# Patient Record
Sex: MALE | Race: WHITE | Employment: OTHER | URBAN - METROPOLITAN AREA
[De-identification: names, ages, dates, MRNs, and addresses within clinical notes are randomized per-mention and may not be internally consistent; named-entity substitution may affect disease eponyms.]

---

## 2017-01-05 ENCOUNTER — TELEPHONE (OUTPATIENT)
Dept: NEUROLOGY | Facility: CLINIC | Age: 62
End: 2017-01-05

## 2017-01-26 NOTE — TELEPHONE ENCOUNTER
Spoke with patient and informed patient of message below. Pt states his insurance changed to Jojo Drummond on 1/1/2017. Advised pt to stop by office with new insurance cards. Pt verbalized understanding and states he will stop by tomorrow.   No further questions at

## 2017-02-06 NOTE — TELEPHONE ENCOUNTER
Spoke to Lula Herrera at [address], [employer] ([phone]) is valid and billable, no copayment applies to the service, covered at 100% of the allowed amount. No prior authorization or predetermination required.  Call reference #[id number], time

## 2017-02-07 ENCOUNTER — OFFICE VISIT (OUTPATIENT)
Dept: SURGERY | Facility: CLINIC | Age: 62
End: 2017-02-07
Attending: SURGERY

## 2017-02-07 VITALS
DIASTOLIC BLOOD PRESSURE: 81 MMHG | OXYGEN SATURATION: 93 % | SYSTOLIC BLOOD PRESSURE: 122 MMHG | RESPIRATION RATE: 18 BRPM | HEART RATE: 81 BPM | BODY MASS INDEX: 42.59 KG/M2 | HEIGHT: 71 IN | WEIGHT: 304.19 LBS

## 2017-02-07 NOTE — H&P
[address], [address] [address]  Dept: [phone]    2/7/2017  Bariatric New Patient Evaluation    Chief Complaint: Morbid obesity    History of Present Illness:  Re [facility]  2012    Comment Dr. Alexander Petersen    OTHER SURGICAL HISTORY  3/11    Comment A.JON    OTHER SURGICAL HISTORY  7/07    Comment FOOT SX    OTHER SURGICAL HISTORY      Comment cervical surgery Years of Education:                 Number of children:               Social History Main Topics    Smoking Status: Never Smoker                      Smokeless Status: Never Used                        Alcohol Use: Yes           0.0 oz/week       0 Sta •  Mexiletine HCl 150 MG Oral Cap, Take 150 mg by mouth 2 (two) times daily. , Disp: , Rfl:   •  Multiple Vitamin (MULTIVITAMIN OR), Take  by mouth., Disp: , Rfl:   •  Nebivolol HCl (BYSTOLIC) 2.5 MG Oral Tab, Take 2.5 mg by mouth every evening., Disp: , did talk about the different operations available as well as her relative strengths and weaknesses. Specifically he is interested in a gastric bypass which I believe would make a good choice for him given his diabetes mellitus.     Plan: Follow-up with me

## 2017-02-08 ENCOUNTER — TELEPHONE (OUTPATIENT)
Dept: SURGERY | Facility: CLINIC | Age: 62
End: 2017-02-08

## 2017-02-08 NOTE — TELEPHONE ENCOUNTER
2/6/17 @ 2:50pm Spoke to Aubrie at [facility], [phone]. AXY#[id number]. Verified Bariatric Benefits: Pt has 3 mo wgt mngmt criteria within 12 mo prior to surgery. 1 surgery per lifetime. Services must be received at [facility] for benefits.  No benes for outside facili

## 2017-02-09 ENCOUNTER — APPOINTMENT (OUTPATIENT)
Dept: GENERAL RADIOLOGY | Facility: HOSPITAL | Age: 62
End: 2017-02-09
Attending: ANESTHESIOLOGY
Payer: COMMERCIAL

## 2017-02-09 ENCOUNTER — SURGERY (OUTPATIENT)
Age: 62
End: 2017-02-09

## 2017-02-10 ENCOUNTER — TELEPHONE (OUTPATIENT)
Dept: SURGERY | Facility: CLINIC | Age: 62
End: 2017-02-10

## 2017-02-20 ENCOUNTER — OFFICE VISIT (OUTPATIENT)
Dept: SURGERY | Facility: CLINIC | Age: 62
End: 2017-02-20
Attending: INTERNAL MEDICINE

## 2017-02-20 VITALS
BODY MASS INDEX: 41.59 KG/M2 | SYSTOLIC BLOOD PRESSURE: 133 MMHG | OXYGEN SATURATION: 94 % | HEART RATE: 80 BPM | HEIGHT: 71 IN | RESPIRATION RATE: 18 BRPM | WEIGHT: 297.06 LBS | DIASTOLIC BLOOD PRESSURE: 84 MMHG

## 2017-02-20 DIAGNOSIS — K21.00 GASTROESOPHAGEAL REFLUX DISEASE WITH ESOPHAGITIS: ICD-10-CM

## 2017-02-20 DIAGNOSIS — E66.01 MORBID OBESITY WITH BMI OF 40.0-44.9, ADULT (HCC): ICD-10-CM

## 2017-02-20 DIAGNOSIS — G47.33 OSA ON CPAP: ICD-10-CM

## 2017-02-20 DIAGNOSIS — R53.82 CHRONIC FATIGUE: ICD-10-CM

## 2017-02-20 DIAGNOSIS — E55.9 VITAMIN D DEFICIENCY: ICD-10-CM

## 2017-02-20 DIAGNOSIS — E78.00 HYPERCHOLESTEROLEMIA: ICD-10-CM

## 2017-02-20 DIAGNOSIS — I10 ESSENTIAL HYPERTENSION: Primary | ICD-10-CM

## 2017-02-20 DIAGNOSIS — R73.09 ABNORMAL BLOOD SUGAR: ICD-10-CM

## 2017-02-20 DIAGNOSIS — Z99.89 OSA ON CPAP: ICD-10-CM

## 2017-02-20 PROBLEM — K21.9 GERD (GASTROESOPHAGEAL REFLUX DISEASE): Status: ACTIVE | Noted: 2017-02-20

## 2017-02-20 PROBLEM — R53.83 FATIGUE: Status: ACTIVE | Noted: 2017-02-20

## 2017-02-20 PROCEDURE — 99214 OFFICE O/P EST MOD 30 MIN: CPT | Performed by: INTERNAL MEDICINE

## 2017-02-20 RX ORDER — PREDNISONE 20 MG/1
TABLET ORAL
Refills: 0 | COMMUNITY
Start: 2017-02-13 | End: 2017-04-11 | Stop reason: ALTCHOICE

## 2017-02-20 NOTE — PROGRESS NOTES
The Wellness and Weight Loss Consultation Note       Date of Consult:  2017    Patient:  Danilo Mc  :      10/12/1955  MRN:      [id number]    Referring Provider: Dr. Iglesia Joiner       Chief Complaint:  Patient presents with:  Consult  Pre-Op E (two) times daily. Disp:  Rfl: 3   ergocalciferol 06517 UNITS Oral Cap 1 tab PO once weekly for 12 weeks Disp: 12 capsule Rfl: 0   triamcinolone acetonide 0.1 % External Cream Apply topically 2 (two) times daily.  Apply as needed Disp:  Rfl:    Omeprazole 4 Smokeless tobacco: Never Used    Alcohol Use: Yes  0.0 oz/week    0 Standard drinks or equivalent per week         Comment: 3 drinks a month     Drug Use: No    Sexual Activity: Not on file   Not on file  Other Topics Concern    Caffeine Concern Yes    Com Jacquelyn Client Father    • benign prostatic hypertrophy[other] [OTHER] Father      x4   • CABG[other] [OTHER] Father    • Heart Disease Father    • Heart Disorder Mother    • Other[other] Jacquelyn Client Mother    • Asthma Mother    • Hypertension Mother    • Heart Disea bilaterally  Heart: S1, S2 normal  Abdomen: firm, obese, non tender  Extremities: edema trace, limited movement due to pain  Pulses: 2+ and symmetric  Skin: Skin color, texture, turgor normal. No rashes or lesions    ASSESSMENT     HYPERTENSION:  The patie or regular soda. 3. Increase activity-upper body exercises, walk 10 minutes per day. 4. Increase fruit and vegetable servings to 5-6 per day.       Attended seminar    Saw surgeon  To see RD/psych  Needs pulmonary and cardiac clearance    Needs pre surgic

## 2017-02-21 ENCOUNTER — OFFICE VISIT (OUTPATIENT)
Dept: SURGERY | Facility: CLINIC | Age: 62
End: 2017-02-21

## 2017-02-21 DIAGNOSIS — E66.01 MORBID OBESITY WITH BMI OF 40.0-44.9, ADULT (HCC): Primary | ICD-10-CM

## 2017-02-21 PROCEDURE — 97802 MEDICAL NUTRITION INDIV IN: CPT | Performed by: DIETITIAN, REGISTERED

## 2017-02-22 VITALS — HEIGHT: 71 IN | BODY MASS INDEX: 41.58 KG/M2 | WEIGHT: 297 LBS

## 2017-02-22 NOTE — PROGRESS NOTES
[address] [facility] BARIATRIC AND WEIGHT LOSS CLINIC  [address] [place] [address]  Dept: [phone]  Loc: [phone]    02/22/2017    Bariatric Initial Nutrition Assessment    Suzann Cranker is a 57year old male.     Refer binge eating behavior: Eats a larger amount of food than normal in a short period of time. , Feels a lack of control over the amount of food or rate of food eaten., Eats much more rapidly than normal., Eats until uncomfortably full., Eats large amounts of f MG Oral Tab, TAKE 3 TABS IN THE AM X 3 DAYS, 2 TABS IN THE AM X 7 DAYS THE 1 TAB IN THE AM X 7 DAYS  •  REED-24 300 MG Oral Capsule SR 24 Hr, Take 300 mg by mouth 2 (two) times daily.   •  ergocalciferol 65254 UNITS Oral Cap, 1 tab PO once weekly for 12 wee Snack       Lunch     PM Snack     Dinner Evening Snack   Sweet roll with milk or juice  Leftover lasagna OR sandwich on white bread with ham/turkey, miracle whip, cheese + chips Cake, candy, chips, crackers [employer] beef sandwich, 1/2 order fries, efren goals and Obtain MVI  2. Coordination of care: attend support group prior to surgery; reminder for importance of multidisciplinary consultations.   3.  Materials given: Choose Your Foods, Food List for [facility], [facility] Bariatric Manual and [phone]

## 2017-02-28 ENCOUNTER — OFFICE VISIT (OUTPATIENT)
Dept: NUTRITION/OBESITY MEDICINE | Facility: HOSPITAL | Age: 62
End: 2017-02-28
Attending: SURGERY

## 2017-02-28 DIAGNOSIS — E66.01 MORBID OBESITY DUE TO EXCESS CALORIES (HCC): Primary | ICD-10-CM

## 2017-02-28 PROCEDURE — 96101 PSYCL TSTG PR HR F2F TIME W/PT: CPT | Performed by: OTHER

## 2017-02-28 NOTE — PROGRESS NOTES
Psychological Evaluation    Patient Name: Pepito Morales  Visit Date:  2017  :   10/12/1955    Reason for Referral:    Rosales Tan is a 57year old   male who was referred for a psychological evaluation prior to being scheduled for [facility] stated that since he has been disability he has gained approximately 30 lbs, which he attributes to being more sedentary, as he was rather active at his job, and making unhealthy choices.     Concerning medical issues, Migdalia Al noted that she suffers from hyper while watching TV.     Tests Administered:     Structured Clinical Interview   Lindy Langener Depression Inventory II (BDI II)   Abel Anxiety Inventory (NICOLAS)   Quality of Life Inventory (QOLI)   Rapid Eating Assessment for Patients (REAP)   The Body Esteem Scale (BES hands trembling, feeling shaky, difficulty breathing, and indigestion, all of which he endorsed as being attributable to physical conditions, as opposed to mood. Favio completed The Body-Esteem Scale (BES).   The BES is a 39-item Likert-type scale self-as Madison Cleveland noted some problematic eating patterns, endorsing that he usually/often: eats less than 3 servings of whole grain products a day, eats less than 2-3 servings of fruit a day, eats less than 3-4 servings of vegetables/potatoes a day, uses re sensorium or mental capacity. His overall affect and mood fell within the normal range. Also, his speech and thought processes fell within the normal range.    Migdalia Al appears to be motivated and ready to undergo the procedure and follow the postsurgical dem that although he is aware that he is making poor choices, it is difficult for him to make a healthier choice because it doesn't feel as satiating.   Along with a history of bingeing, his performance on the REAP indicated some problematic eating habits, whic

## 2017-03-28 ENCOUNTER — TELEPHONE (OUTPATIENT)
Dept: SURGERY | Facility: CLINIC | Age: 62
End: 2017-03-28

## 2017-03-28 NOTE — TELEPHONE ENCOUNTER
3/28/17 @ 12:08pm Lm for patient to call and schedule appt this week with More Campo or Madeleine Loredo for f/u appt. Patient has a 3 month weight management criteria on his plan and has not seen anyone in March.  To keep him on track for surgery, he needs

## 2017-03-31 ENCOUNTER — OFFICE VISIT (OUTPATIENT)
Dept: SURGERY | Facility: CLINIC | Age: 62
End: 2017-03-31

## 2017-03-31 VITALS — BODY MASS INDEX: 41 KG/M2 | WEIGHT: 292.88 LBS | HEIGHT: 71 IN

## 2017-03-31 DIAGNOSIS — E66.01 MORBID OBESITY DUE TO EXCESS CALORIES (HCC): Primary | ICD-10-CM

## 2017-03-31 PROCEDURE — 97803 MED NUTRITION INDIV SUBSEQ: CPT | Performed by: DIETITIAN, REGISTERED

## 2017-03-31 NOTE — PROGRESS NOTES
[address] [facility] AND WEIGHT LOSS CLINIC  [address] [address]  Dept: [phone]  Loc: [phone]    03/31/2017      Bariatric Follow-up Nutrition Session    Kaylin Bustillo is a 57year old male.      Ass 150 mg by mouth 2 (two) times daily  •  Multiple Vitamin (MULTIVITAMIN OR), Take  by mouth  •  Nebivolol HCl (BYSTOLIC) 2.5 MG Oral Tab, Take 2.5 mg by mouth every evening  •  Montelukast Sodium (SINGULAIR) 10 MG Oral Tab, Take 10 mg by mouth nightly    He Anthropometric measurements, Food/fluid intake/choices, Food intolerances, Activity level, Vitamin/mineral supplementation, Reinforce goals, Calorie/protein intake and Other:  RTC in 1 month, appt scheduled  Additional RD visits required to review conc

## 2017-04-04 ENCOUNTER — OFFICE VISIT (OUTPATIENT)
Dept: SURGERY | Facility: CLINIC | Age: 62
End: 2017-04-04

## 2017-04-04 VITALS
BODY MASS INDEX: 41 KG/M2 | WEIGHT: 292.31 LBS | RESPIRATION RATE: 16 BRPM | HEART RATE: 77 BPM | SYSTOLIC BLOOD PRESSURE: 110 MMHG | DIASTOLIC BLOOD PRESSURE: 59 MMHG

## 2017-04-04 NOTE — PROGRESS NOTES
[address], [address] [address]  Dept: [phone]    4/4/2017    BARIATRIC EXISTING PATIENT/FOLLOW UP    HPI:  Alina Nair is a 57year old-year old male who pre I eagerly await his final note. He has upcoming appointment with a cardiologist.  I do believe a gastric bypass would be a fine operation for the patient we talked about the operation itself the anatomic changes made.   Additionally we talked about potenti

## 2017-04-06 ENCOUNTER — OFFICE VISIT (OUTPATIENT)
Dept: SURGERY | Facility: CLINIC | Age: 62
End: 2017-04-06

## 2017-04-06 VITALS
BODY MASS INDEX: 40.64 KG/M2 | SYSTOLIC BLOOD PRESSURE: 112 MMHG | RESPIRATION RATE: 16 BRPM | OXYGEN SATURATION: 97 % | WEIGHT: 290.31 LBS | HEIGHT: 71 IN | HEART RATE: 89 BPM | DIASTOLIC BLOOD PRESSURE: 72 MMHG

## 2017-04-06 DIAGNOSIS — E78.00 HYPERCHOLESTEROLEMIA: ICD-10-CM

## 2017-04-06 DIAGNOSIS — E66.01 MORBID OBESITY WITH BMI OF 40.0-44.9, ADULT (HCC): ICD-10-CM

## 2017-04-06 DIAGNOSIS — I10 ESSENTIAL HYPERTENSION: Primary | ICD-10-CM

## 2017-04-06 DIAGNOSIS — R53.82 CHRONIC FATIGUE: ICD-10-CM

## 2017-04-06 PROCEDURE — 99214 OFFICE O/P EST MOD 30 MIN: CPT | Performed by: INTERNAL MEDICINE

## 2017-04-06 NOTE — PROGRESS NOTES
[address], [facility]  [address]  [facility] [address] [facility] [address]  Dept: [phone]     Date:   2017    Patient:  Fracisco Potter  :      10/12/1955  MRN:      [id number]    Chief Complaint: ergocalciferol 46176 UNITS Oral Cap 1 tab PO once weekly for 12 weeks Disp: 12 capsule Rfl: 0   triamcinolone acetonide 0.1 % External Cream Apply topically 2 (two) times daily.  Apply as needed Disp:  Rfl:    Omeprazole 40 MG Oral Capsule Delayed Release per week         Comment: 3 drinks a month     Drug Use: No    Sexual Activity: Not on file   Not on file  Other Topics Concern    Caffeine Concern Yes    Comment: 1 cup a week    Exercise Yes    Comment: 1 x a week     Social History Narrative     Surgica Father    • Heart Disease Father    • Heart Disorder Mother    • Other[other] Dede Dys Mother    • Asthma Mother    • Hypertension Mother    • Heart Disease Mother    • Other[other] [OTHER] Sister      REUMATOID ARTHRITIS   • Cancer Neg    • Stroke Neg cooperative  Head: Normocephalic, without obvious abnormality, atraumatic  Eyes: conjunctivae/corneas clear. PERRL, EOM's intact. Fundi benign.   Lungs: clear to auscultation bilaterally  Heart: S1, S2 normal, no murmur, click, rub or gallop, regular rate a Goals for next month:  1. Keep a food log. 2. Drink 48-64 ounces of non-caloric beverages per day. No fruit juices or regular soda. 3. Increase activity-upper body exercises, walk 10 minutes per day.   4. Increase fruit and vegetable servings to 5-6 p

## 2017-04-11 ENCOUNTER — OFFICE VISIT (OUTPATIENT)
Dept: FAMILY MEDICINE CLINIC | Facility: CLINIC | Age: 62
End: 2017-04-11

## 2017-04-11 VITALS
SYSTOLIC BLOOD PRESSURE: 128 MMHG | TEMPERATURE: 98 F | DIASTOLIC BLOOD PRESSURE: 70 MMHG | HEART RATE: 86 BPM | BODY MASS INDEX: 42 KG/M2 | RESPIRATION RATE: 16 BRPM | WEIGHT: 299 LBS

## 2017-04-11 DIAGNOSIS — H61.23 BILATERAL IMPACTED CERUMEN: Primary | ICD-10-CM

## 2017-04-11 PROCEDURE — 69210 REMOVE IMPACTED EAR WAX UNI: CPT | Performed by: FAMILY MEDICINE

## 2017-04-11 NOTE — PATIENT INSTRUCTIONS
Earwax Removal    The ear canal makes earwax from the canal’s lining. The ears make wax to lubricate and protect the ear canal. The ear canal is the tube that connects the middle ear to the outside of the ear.  The wax protects the ear from bacteria, infe · Don’t use cotton swabs in your ears. Cotton swabs may push wax deeper into the ear canal or damage the eardrum.  Use cotton gauze or a wet washcloth  to gently remove wax on the outside of the ear and around the opening to the ear canal.  · Don't use any © 6976-0852 [address], [address] [place] [place]. All rights reserved. This information is not intended as a substitute for professional medical care. Always follow your healthcare professional's instructions.

## 2017-04-11 NOTE — PROGRESS NOTES
HPI:   Arturo Amador is a 57year old male who presents for upper respiratory symptoms for  5  days. Patient reports bilateral ear fullness (R>L), mild right otalgia, denies congestion or sore throat, denies fever.       Current Outpatient Prescriptions: unspecified    • Esophageal reflux    • Gallbladder problem    • V-tach [facility])    • Nose disorder    • Cellulitis and abscess of trunk    • Other and unspecified hyperlipidemia    • Arrhythmia    • History of blood clots    • COPD (chronic obstructive pulmo MAIN OR     Left 1/7/2016    Comment Procedure: STELLATE GANGLION BLOCK;  Surgeon: Joycelyn Sommer MD;  Location:  MAIN OR      Family History   Problem Relation Age of Onset   • Hypertension Father    • Other[other] [OTHER] Father    • benign prostati of these issues and agrees to the plan. The patient is asked to return if sx's persist or worsen.

## 2017-04-11 NOTE — PROCEDURES
After obtaining verbal consent and discussing risks and benefits of procedure, ear lavage was done to both ears along with curette to remove excess cerumen. Excess cerumen was completely evacuated. Patient had subjective relief. No complications noted.

## 2017-04-17 ENCOUNTER — PRIOR ORIGINAL RECORDS (OUTPATIENT)
Dept: OTHER | Age: 62
End: 2017-04-17

## 2017-04-18 ENCOUNTER — PRIOR ORIGINAL RECORDS (OUTPATIENT)
Dept: OTHER | Age: 62
End: 2017-04-18

## 2017-04-24 ENCOUNTER — PRIOR ORIGINAL RECORDS (OUTPATIENT)
Dept: OTHER | Age: 62
End: 2017-04-24

## 2017-04-27 ENCOUNTER — LAB ENCOUNTER (OUTPATIENT)
Dept: LAB | Facility: HOSPITAL | Age: 62
End: 2017-04-27
Attending: INTERNAL MEDICINE
Payer: COMMERCIAL

## 2017-04-27 ENCOUNTER — PRIOR ORIGINAL RECORDS (OUTPATIENT)
Dept: OTHER | Age: 62
End: 2017-04-27

## 2017-04-27 DIAGNOSIS — I10 ESSENTIAL HYPERTENSION: ICD-10-CM

## 2017-04-27 DIAGNOSIS — R73.09 ABNORMAL BLOOD SUGAR: ICD-10-CM

## 2017-04-27 DIAGNOSIS — K21.00 GASTROESOPHAGEAL REFLUX DISEASE WITH ESOPHAGITIS: ICD-10-CM

## 2017-04-27 DIAGNOSIS — E66.01 MORBID OBESITY WITH BMI OF 40.0-44.9, ADULT (HCC): ICD-10-CM

## 2017-04-27 DIAGNOSIS — E78.00 PURE HYPERCHOLESTEROLEMIA: Primary | ICD-10-CM

## 2017-04-27 DIAGNOSIS — G47.33 OSA ON CPAP: ICD-10-CM

## 2017-04-27 DIAGNOSIS — E55.9 VITAMIN D DEFICIENCY: ICD-10-CM

## 2017-04-27 DIAGNOSIS — R53.82 CHRONIC FATIGUE: ICD-10-CM

## 2017-04-27 DIAGNOSIS — E78.00 HIGH CHOLESTEROL: ICD-10-CM

## 2017-04-27 DIAGNOSIS — E78.00 HYPERCHOLESTEROLEMIA: ICD-10-CM

## 2017-04-27 DIAGNOSIS — Z99.89 OSA ON CPAP: ICD-10-CM

## 2017-04-27 PROCEDURE — 83550 IRON BINDING TEST: CPT

## 2017-04-27 PROCEDURE — 83540 ASSAY OF IRON: CPT

## 2017-04-27 PROCEDURE — 83036 HEMOGLOBIN GLYCOSYLATED A1C: CPT

## 2017-04-27 PROCEDURE — 80053 COMPREHEN METABOLIC PANEL: CPT

## 2017-04-27 PROCEDURE — 82728 ASSAY OF FERRITIN: CPT

## 2017-04-27 PROCEDURE — 85025 COMPLETE CBC W/AUTO DIFF WBC: CPT

## 2017-04-27 PROCEDURE — 36415 COLL VENOUS BLD VENIPUNCTURE: CPT

## 2017-04-27 PROCEDURE — 80061 LIPID PANEL: CPT

## 2017-04-27 PROCEDURE — 84425 ASSAY OF VITAMIN B-1: CPT

## 2017-04-27 PROCEDURE — 84100 ASSAY OF PHOSPHORUS: CPT

## 2017-04-27 PROCEDURE — 82607 VITAMIN B-12: CPT

## 2017-04-27 PROCEDURE — 82306 VITAMIN D 25 HYDROXY: CPT

## 2017-04-27 PROCEDURE — 84443 ASSAY THYROID STIM HORMONE: CPT

## 2017-04-27 PROCEDURE — 83735 ASSAY OF MAGNESIUM: CPT

## 2017-04-28 LAB
CHOLESTEROL, TOTAL: 144 MG/DL
HDL CHOLESTEROL: 49 MG/DL
LDL CHOLESTEROL: 78 MG/DL
TRIGLYCERIDES: 86 MG/DL

## 2017-04-30 ENCOUNTER — HOSPITAL ENCOUNTER (INPATIENT)
Facility: HOSPITAL | Age: 62
LOS: 2 days | Discharge: HOME | DRG: 378 | End: 2017-05-02
Attending: EMERGENCY MEDICINE | Admitting: INTERNAL MEDICINE
Payer: COMMERCIAL

## 2017-04-30 DIAGNOSIS — K62.5 RECTAL BLEEDING: Primary | ICD-10-CM

## 2017-04-30 PROCEDURE — 5A09357 ASSISTANCE WITH RESPIRATORY VENTILATION, LESS THAN 24 CONSECUTIVE HOURS, CONTINUOUS POSITIVE AIRWAY PRESSURE: ICD-10-PCS | Performed by: HOSPITALIST

## 2017-04-30 PROCEDURE — 99223 1ST HOSP IP/OBS HIGH 75: CPT | Performed by: HOSPITALIST

## 2017-04-30 RX ORDER — IPRATROPIUM BROMIDE AND ALBUTEROL SULFATE 2.5; .5 MG/3ML; MG/3ML
3 SOLUTION RESPIRATORY (INHALATION) 2 TIMES DAILY PRN
Status: DISCONTINUED | OUTPATIENT
Start: 2017-04-30 | End: 2017-05-02

## 2017-04-30 RX ORDER — LISINOPRIL 20 MG/1
20 TABLET ORAL DAILY
Status: DISCONTINUED | OUTPATIENT
Start: 2017-04-30 | End: 2017-05-02

## 2017-04-30 RX ORDER — NEBIVOLOL 5 MG/1
5 TABLET ORAL EVERY MORNING
Status: DISCONTINUED | OUTPATIENT
Start: 2017-05-01 | End: 2017-05-02

## 2017-04-30 RX ORDER — MEXILETINE HYDROCHLORIDE 150 MG/1
150 CAPSULE ORAL 2 TIMES DAILY
Status: DISCONTINUED | OUTPATIENT
Start: 2017-04-30 | End: 2017-05-02

## 2017-04-30 RX ORDER — ACETAMINOPHEN 325 MG/1
650 TABLET ORAL EVERY 6 HOURS PRN
Status: DISCONTINUED | OUTPATIENT
Start: 2017-04-30 | End: 2017-05-02

## 2017-04-30 RX ORDER — ONDANSETRON 2 MG/ML
4 INJECTION INTRAMUSCULAR; INTRAVENOUS EVERY 6 HOURS PRN
Status: DISCONTINUED | OUTPATIENT
Start: 2017-04-30 | End: 2017-05-02

## 2017-04-30 RX ORDER — SODIUM CHLORIDE 9 MG/ML
INJECTION, SOLUTION INTRAVENOUS CONTINUOUS
Status: DISCONTINUED | OUTPATIENT
Start: 2017-04-30 | End: 2017-05-02

## 2017-04-30 RX ORDER — PANTOPRAZOLE SODIUM 40 MG/1
40 TABLET, DELAYED RELEASE ORAL
Status: DISCONTINUED | OUTPATIENT
Start: 2017-05-01 | End: 2017-05-02

## 2017-04-30 RX ORDER — ALBUTEROL SULFATE 90 UG/1
2 AEROSOL, METERED RESPIRATORY (INHALATION) EVERY 4 HOURS PRN
Status: DISCONTINUED | OUTPATIENT
Start: 2017-04-30 | End: 2017-05-02

## 2017-04-30 RX ORDER — ALBUTEROL SULFATE 90 UG/1
2 AEROSOL, METERED RESPIRATORY (INHALATION) EVERY 4 HOURS
Status: DISCONTINUED | OUTPATIENT
Start: 2017-04-30 | End: 2017-04-30

## 2017-04-30 RX ORDER — NEBIVOLOL 2.5 MG/1
2.5 TABLET ORAL EVERY EVENING
Status: DISCONTINUED | OUTPATIENT
Start: 2017-04-30 | End: 2017-05-02

## 2017-04-30 RX ORDER — PRAVASTATIN SODIUM 20 MG
20 TABLET ORAL NIGHTLY
Status: DISCONTINUED | OUTPATIENT
Start: 2017-04-30 | End: 2017-05-02

## 2017-04-30 RX ORDER — ALBUTEROL SULFATE 90 UG/1
2 AEROSOL, METERED RESPIRATORY (INHALATION) EVERY 4 HOURS PRN
COMMUNITY

## 2017-04-30 RX ORDER — ERGOCALCIFEROL 1.25 MG/1
1 CAPSULE ORAL
COMMUNITY
Start: 2017-04-03 | End: 2017-04-30

## 2017-04-30 NOTE — PROGRESS NOTES
NURSING ADMISSION NOTE      Patient admitted form ed. Oriented to room. Safety precautions initiated. Bed in low position. Call light in reach. Pt A&ox3. On RA.  & tele in place.  Pacemaker/ defib intact pt states this inserted in 2009 (hx Vta

## 2017-04-30 NOTE — ED NOTES
Per Dr. Aamir Hays, pt in need of admission per GI in order to be scoped due to being on xarelto. Pt aware awaiting bed assignment.

## 2017-04-30 NOTE — ED INITIAL ASSESSMENT (HPI)
Patient presents with blood in stool since last night, 3 episodes total since last night. Patient is taking xarelto.

## 2017-04-30 NOTE — CONSULTS
[facility]  Report of Consultation    Carrillo Bleacher Patient Status:  Emergency    10/12/1955 MRN [id number]   Location [address] [facility] Attending Víctor Piña MD   Hosp Day # 0 PCP THEO SOSA, DO     Reason for Con symptoms (LUTS)     Bilateral hip pain     Bursitis of both hips     Neoplasm of uncertain behavior of stomach, intestines, and rectum     Gastroesophageal reflux disease without esophagitis     Prostate cancer (HCC)     LEMUEL on CPAP     Fatigue     GERD (g Procedure: GREATER TROCHANTERIC BURSA INJECTION BILATERAL;  Surgeon: Adrienne Almaguer MD;  Location: [facility] MAIN OR     Bilateral 11/17/2015    Comment Procedure: SACROILIAC JOINT INJECTION BILATERAL;  Surgeon: Adrienne Almaguer MD;  Location: [facility] MAIN OR mL by nebulization 2 (two) times daily as needed. Disp:  Rfl:    Pravastatin Sodium (PRAVACHOL) 20 MG Oral Tab Take 20 mg by mouth nightly.  Disp:  Rfl:    Tiotropium Bromide Monohydrate (SPIRIVA HANDIHALER) 18 MCG Inhalation Cap Inhale 18 mcg into the lung bowel sounds, no masses, HSM or tenderness, soft, nondistended, no G/R/R , morbid obese  RECTAL: stool in vault, no gross blood  EXTREMITIES: no cyanosis, clubbing or edema, peripheral pulses intact  NEURO:  Oriented x 3   BACK: no CVA tenderness  PSYCH: a

## 2017-04-30 NOTE — ED NOTES
Pt awake and alert, skin w/d,resps reg/unlabored. Pt given apple juice--aware he is on clear liquid diet only.

## 2017-04-30 NOTE — ED NOTES
Pt awake and alert, appears comfortable and in no distress. Pt requesting to eat. To discuss with Dr. Vanesa Castelan.

## 2017-04-30 NOTE — ED PROVIDER NOTES
Patient Seen in: [facility] Emergency Department    History   Patient presents with:  GI Bleeding (gastrointestinal)    Stated Complaint: rectal bleeding/on xarelto    HPI    53-year-old white male who presents emergency room today for complaint of Procedure:  HERNIA VENTRAL REPAIR;  Surgeon: Robb Malone MD;  Location: [facility] MAIN OR    HERNIA SURGERY      SPINE SURGERY PROCEDURE UNLISTED      Comment CERVICAL FUSION/HARDWARE    COLONOSCOPY N/A 5/7/2015    Comment Procedure: COLONOSCOPY;  Surgeon: Chasity Cavazos ipratropium-albuterol (DUONEB) 0.5-2.5 (3) MG/3ML Inhalation Solution,  Take 3 mL by nebulization 2 (two) times daily as needed. Pravastatin Sodium (PRAVACHOL) 20 MG Oral Tab,  Take 20 mg by mouth nightly.    Tiotropium Bromide Monohydrate (Majo Pleas °F (36.6 °C) (Temporal)  Resp 18  Wt 131.543 kg  SpO2 95%        Physical Exam    Well-developed well-nourished male who is sitting on the gurney,he is awake and alert and appears in no acute discomfort or distress.     Vital signs are stable he is afebrile on the individual orders. RAINBOW DRAW BLUE   RAINBOW DRAW GOLD   RAINBOW DRAW LAVENDER   RAINBOW DRAW LIGHT GREEN       MDM   Patient had an IV established in the emergency room was placed on a cardiac monitor was observed.   Patient had no further sympt

## 2017-04-30 NOTE — H&P
[facility]  History and Physical     Abbi Spencer Patient Status:  Emergency    10/12/1955 MRN [id number]   Location [facility] [facility] Attending Frida Yanes MD   Hosp Day # 0  [facility]     Chief Comp VENTRAL HERNIA REPAIR N/A 12/15/2014    Comment Procedure:  HERNIA VENTRAL REPAIR;  Surgeon: Yash Kennedy MD;  Location: [facility] MAIN OR    HERNIA SURGERY      SPINE SURGERY PROCEDURE UNLISTED      Comment CERVICAL FUSION/HARDWARE    COLONOSCOPY N/A 5/7/2015 Apply 1 Application topically 2 (two) times daily. Disp: 453 g Rfl: 3   REED-24 300 MG Oral Capsule SR 24 Hr Take 300 mg by mouth 2 (two) times daily.  Disp:  Rfl: 3   Omeprazole 40 MG Oral Capsule Delayed Release Take 1 capsule (40 mg total) by mouth 2 (tw Anicteric. Neck: No lymphadenopathy. No JVD. No carotid bruits. Respiratory: Clear to auscultation bilaterally. No wheezes. No rhonchi. Cardiovascular: S1, S2. Regular rate and rhythm. No murmurs, rubs or gallops. Equal pulses.    Chest and Back: No tend discussed with patient GI and ED physician    Dayanara Landa MD  4/30/2017

## 2017-05-01 ENCOUNTER — TELEPHONE (OUTPATIENT)
Dept: FAMILY MEDICINE CLINIC | Facility: CLINIC | Age: 62
End: 2017-05-01

## 2017-05-01 PROCEDURE — 99232 SBSQ HOSP IP/OBS MODERATE 35: CPT | Performed by: HOSPITALIST

## 2017-05-01 NOTE — PROGRESS NOTES
[facility]  Progress Note    Walter Quezada Patient Status:  Inpatient    10/12/1955 MRN [id number]   [facility] 3SW-A Attending Adriano Warner MD   Hosp Day # 1 PCP THEO SOSA, DO     Subjective:  Walter Quezada is a(n) 57 yea cardioverter-defibrillator) in place     Umbilical hernia     Allergic dermatitis     Costochondral chest pain     Nocturia     Hypertrophy of prostate with urinary obstruction and other lower urinary tract symptoms (LUTS)     Bilateral hip pain     Bursit

## 2017-05-01 NOTE — PROGRESS NOTES
[facility]  Progress Note     Carry Laith Patient Status:  Inpatient    10/12/1955 MRN [id number]   [facility] 3SW-A Attending Clau Woods MD   Hosp Day # 1 PCP [address] [place] [address]     Chief Complaint: rectal bleed    S: Red García puff Inhalation Daily       ASSESSMENT / PLAN:     1.  Rectal bleed-57years old man was started on Xarelto recently and last dose was last night presents with complaint of passing blood mixed with streaks of blood.  Rectal exam done in the emergency room s

## 2017-05-01 NOTE — RESPIRATORY THERAPY NOTE
LEMUEL - Equipment Use Daily Summary:  · Set Mode CFLEX  · Usage in hours: 5:27  · 90% Pressure (EPAP) level:   · 90% Insp Pressure (IPAP): 10  · AHI: 2  · Supplemental Oxygen:  · Comments:

## 2017-05-01 NOTE — PLAN OF CARE
Patient remained alert and oriented to person, place, time and situation this shift. On room air with continuous pulse oximetry monitoring and oxygen saturations remaining above 92%, using cpap while sleeping. Telemetry monitoring in place.   [facility] bilater

## 2017-05-01 NOTE — PAYOR COMM NOTE
Attending Physician: Easton Tee MD    Review Type: ADMISSION   Reviewer: Ismael Mckeon       Date: May 1, 2017 - 9:35 AM  Payor: Jack FLANAGAN PPO.EPO.[facility]  Authorization Number: [id number]/A  Admit date: 4/30/2017  1:44 PM   Admitted from [facility]   ANGIOGRAM          CHOLECYSTECTOMY    [id number]      Comment  Dr. Miguel Thomas     OTHER SURGICAL HISTORY    3/11      Comment  A.KATHY UMANZOR      OTHER SURGICAL HISTORY    7/07      Comment  FOOT SX      OTHER SURGICAL HISTORY          Comment  cervical surgery  Oral Capsule SR 24 Hr,  Take 300 mg by mouth 2 (two) times daily.   Multiple Vitamins-Minerals (TAB-A-JESUS ALBERTO MAXIMUM) Oral Tab,  Take 1 tablet by mouth daily.   triamcinolone acetonide 0.1 % External Cream,  Apply 1 Application topically 2 (two) times daily. HPI.      Physical Exam      ED Triage Vitals    BP  04/30/17 1315  148/79 mmHg    Pulse  04/30/17 1315  83    Resp  04/30/17 1315  18    Temp  04/30/17 1315  97.9 °F (36.6 °C)    Temp src  04/30/17 1315  Temporal    SpO2  04/30/17 1315  96 %    O2 Device  were created for panel order CBC WITH DIFFERENTIAL WITH PLATELET.   YMQJUGHHN                               Abnormality         Status                      ---------                               -----------         ------                      CBC W/ DIFFER hours and he is also reporting taking Xarelto at home.  He reports history of paroxysmal atrial fibrillation and PE.    PMH as noted above in ED MD Assessment    ASSESSMENT / PLAN:        1.  Rectal bleed-57years old man was started on Xarelto recently and 0046 Given 1 puff Inhalation Eve Armenta RCP      Mexiletine HCl (MEXITIL) cap 150 mg     Date Action Dose Route User    4/30/2017 2014 Given 150 mg Oral Maggie Contreras RN      Nebivolol HCl (BYSTOLIC) tab 2.5 mg     Date Action Dose Route Us Normal   CBC, PLATELET; NO DIFFERENTIAL - Normal   CBC WITH DIFFERENTIAL WITH PLATELET   HEMOGLOBIN         PLEASE FAX ALL INPT DAYS AS CERTIFIED AND NRD

## 2017-05-01 NOTE — TELEPHONE ENCOUNTER
seth  Pt's wife, Maribel An called our office to cancel her 's appt w/Dr. Teo Finnegan for Tues 5/2/17 (appt for ear pain). Per Maribel An, pt was admitted yesterday to [facility] for rectal bleeding; pt restarted Xarelto and GI  wanted to keep pt overnight.   Thank you

## 2017-05-02 ENCOUNTER — PRIOR ORIGINAL RECORDS (OUTPATIENT)
Dept: OTHER | Age: 62
End: 2017-05-02

## 2017-05-02 VITALS
TEMPERATURE: 98 F | BODY MASS INDEX: 41 KG/M2 | OXYGEN SATURATION: 94 % | RESPIRATION RATE: 18 BRPM | WEIGHT: 292.75 LBS | DIASTOLIC BLOOD PRESSURE: 72 MMHG | SYSTOLIC BLOOD PRESSURE: 132 MMHG | HEART RATE: 79 BPM

## 2017-05-02 PROCEDURE — 99239 HOSP IP/OBS DSCHRG MGMT >30: CPT | Performed by: HOSPITALIST

## 2017-05-02 NOTE — PROGRESS NOTES
[facility]  Progress Note    Gretchen Peters Patient Status:  Inpatient    10/12/1955 MRN [id number]   [facility] 3SW-A Attending Escobar Rider MD   Hosp Day # 2 PCP THEO SOSA, DO     Subjective:  Gretchen Peters is a(n) 57 yea reflux disease)     Morbid obesity with BMI of 40.0-44.9, adult [facility])     Rectal bleeding       Weston Elizondo is a(n) 57year old male. Pt with recent rectal bleeding, resolved. Likely diverticular. Hb stable. Plan:    1. No Xarelto x 1 week.   2.

## 2017-05-02 NOTE — RESPIRATORY THERAPY NOTE
LEMUEL Equipment Usage Summary :            Set Mode :CFLEX          Usage in Hours:8;36          90% Pressure (EPAP) : 10           90% Insp Pressure (IPAP);           AHI : 4.1          Supplemental Oxygen :      LPM

## 2017-05-03 ENCOUNTER — PATIENT OUTREACH (OUTPATIENT)
Dept: CASE MANAGEMENT | Age: 62
End: 2017-05-03

## 2017-05-03 DIAGNOSIS — K62.5 RECTAL BLEEDING: Primary | ICD-10-CM

## 2017-05-03 NOTE — PROGRESS NOTES
Initial Post Discharge Follow Up   Discharge Date: 5/2/17  Contact Date: 5/3/2017    Consent Verification:  Assessment Completed With: Patient  HIPAA Verified?   Yes    Discharge Dx:   Rectal bleeding    General:   • How have you been since your discharg Take 3 mL by nebulization 2 (two) times daily as needed. Disp:  Rfl:    Pravastatin Sodium (PRAVACHOL) 20 MG Oral Tab Take 20 mg by mouth nightly.  Disp:  Rfl:    Tiotropium Bromide Monohydrate (SPIRIVA HANDIHALER) 18 MCG Inhalation Cap Inhale 18 mcg into t ONC RN LEVEL 2 FOLLOW UP with ABIODUN RAD/ONC NURSE DEPARTMENT OF RADIATION ONCOLOGY (Abiodun at [facility] [facility] )    Monday October 23, 2017  9:45 AM DMG RAD ONC MD LEVEL 2 FOLLOW UP [place] [address] (Abiodun at [place] [address]

## 2017-05-04 NOTE — DISCHARGE SUMMARY
[facility]  DISCHARGE SUMMARY     Walter Spotted Patient Status:  Inpatient    10/12/1955 MRN [id number]   [facility] 3SW-A Attending No att. providers found   Hosp Day # 2 PCP THEO SOSA DO     Date of Admission:  capsule   Refills:  6         CONTINUE taking these medications       Instructions Prescription details    BYSTOLIC 5 MG Tabs   Last time this was given:  5 mg on 5/2/2017  7:25 AM   Generic drug:  Nebivolol HCl        Take 5 mg by mouth every morning. Refills:  0       Tiotropium Bromide Monohydrate 18 MCG Caps   Commonly known as:  SPIRIVA HANDIHALER        Inhale 18 mcg into the lungs daily.     Refills:  0       triamcinolone acetonide 0.1 % Crea   Commonly known as:  KENALOG        Apply 1 Applica

## 2017-05-08 ENCOUNTER — OFFICE VISIT (OUTPATIENT)
Dept: FAMILY MEDICINE CLINIC | Facility: CLINIC | Age: 62
End: 2017-05-08

## 2017-05-08 VITALS
HEIGHT: 71 IN | WEIGHT: 296 LBS | SYSTOLIC BLOOD PRESSURE: 128 MMHG | BODY MASS INDEX: 41.44 KG/M2 | RESPIRATION RATE: 18 BRPM | DIASTOLIC BLOOD PRESSURE: 76 MMHG | HEART RATE: 84 BPM | TEMPERATURE: 98 F

## 2017-05-08 DIAGNOSIS — K62.5 RECTAL BLEED: Primary | ICD-10-CM

## 2017-05-08 DIAGNOSIS — H91.91 HEARING LOSS OF RIGHT EAR, UNSPECIFIED HEARING LOSS TYPE: ICD-10-CM

## 2017-05-08 PROCEDURE — 99495 TRANSJ CARE MGMT MOD F2F 14D: CPT | Performed by: FAMILY MEDICINE

## 2017-05-08 NOTE — PROGRESS NOTES
HPI:    Pippa Frank is a 57year old male here today for hospital follow up.    He was discharged from Inpatient hospital, [facility] to Home   Admission Date: 4/30/17   Discharge Date: 5/2/17  Hospital Discharge Diagnosis: rectal bleed  TCM Diag Again, he is not taking his Xarelto yet but will start it tonight. Denies any further episodes of rectal bleeding. Denies any abdominal pain or diarrhea. Denies any fevers. Allergies:  He is allergic to bee venom.     Current Meds:    Current Outpatie has a past medical history of Unspecified essential hypertension; Extrinsic asthma, unspecified; Esophageal reflux; Gallbladder problem; V-tach ([facility] [address] Nose disorder; Cellulitis and abscess of trunk; Other and unspecified hyperlipidemia; Arrhythmia;  History heartburn, denies diarrhea  MUSCULOSKELETAL: denies pain, normal range of motion of extremities  NEURO: denies headaches, denies dizziness, denies weakness  PSYCHE: denies depression or anxiety  HEMATOLOGIC: denies hx of anemia, or bruising, denies bleedin period of discharge to 30 days:   · Number of Possible Diagnoses and/or Management Options: moderate  · Amount and/or Complexity of Data to Be Reviewed: moderate  · Risk of Significant Complications, Morbidity, and/or Mortality: moderate    Overall Risk:

## 2017-05-09 ENCOUNTER — OFFICE VISIT (OUTPATIENT)
Dept: SURGERY | Facility: CLINIC | Age: 62
End: 2017-05-09

## 2017-05-09 VITALS
BODY MASS INDEX: 41.06 KG/M2 | WEIGHT: 293.31 LBS | HEART RATE: 69 BPM | HEIGHT: 71 IN | RESPIRATION RATE: 16 BRPM | SYSTOLIC BLOOD PRESSURE: 118 MMHG | DIASTOLIC BLOOD PRESSURE: 72 MMHG

## 2017-05-09 NOTE — PROGRESS NOTES
[address], [address] [address]  Dept: [phone]    5/9/2017    BARIATRIC EXISTING PATIENT/FOLLOW UP    HPI:  Kaylin Bustillo is a 57year old-year old male who pre hopefully will be able to schedule surgery for sometime in July pending final approval from the cardiologist and pulmonologist    Andreia Baird MD  5/9/2017

## 2017-05-17 ENCOUNTER — PRIOR ORIGINAL RECORDS (OUTPATIENT)
Dept: OTHER | Age: 62
End: 2017-05-17

## 2017-05-19 ENCOUNTER — PRIOR ORIGINAL RECORDS (OUTPATIENT)
Dept: OTHER | Age: 62
End: 2017-05-19

## 2017-05-26 PROBLEM — K57.30 DIVERTICULOSIS OF LARGE INTESTINE WITHOUT HEMORRHAGE: Status: ACTIVE | Noted: 2017-05-26

## 2017-05-26 PROBLEM — Z86.010 HISTORY OF COLON POLYPS: Status: ACTIVE | Noted: 2017-05-26

## 2017-06-08 ENCOUNTER — OFFICE VISIT (OUTPATIENT)
Dept: SURGERY | Facility: CLINIC | Age: 62
End: 2017-06-08

## 2017-06-08 VITALS
DIASTOLIC BLOOD PRESSURE: 77 MMHG | RESPIRATION RATE: 18 BRPM | HEIGHT: 71 IN | OXYGEN SATURATION: 95 % | WEIGHT: 296.88 LBS | HEART RATE: 87 BPM | BODY MASS INDEX: 41.56 KG/M2 | SYSTOLIC BLOOD PRESSURE: 124 MMHG

## 2017-06-08 DIAGNOSIS — R53.82 CHRONIC FATIGUE: ICD-10-CM

## 2017-06-08 DIAGNOSIS — Z99.89 OSA ON CPAP: ICD-10-CM

## 2017-06-08 DIAGNOSIS — I10 ESSENTIAL HYPERTENSION: Primary | ICD-10-CM

## 2017-06-08 DIAGNOSIS — E78.00 HYPERCHOLESTEROLEMIA: ICD-10-CM

## 2017-06-08 DIAGNOSIS — E66.01 MORBID OBESITY WITH BMI OF 40.0-44.9, ADULT (HCC): ICD-10-CM

## 2017-06-08 DIAGNOSIS — G47.33 OSA ON CPAP: ICD-10-CM

## 2017-06-08 PROBLEM — K62.5 RECTAL BLEEDING: Status: RESOLVED | Noted: 2017-04-30 | Resolved: 2017-06-08

## 2017-06-08 PROCEDURE — 99213 OFFICE O/P EST LOW 20 MIN: CPT | Performed by: INTERNAL MEDICINE

## 2017-06-08 NOTE — PROGRESS NOTES
[address], [facility]  [address] [facility] [address] [facility] [address]  Dept: [phone]     Date:   2017    Patient:  Shona Alejo  :      10/12/1955  MRN:      [id number]    Chief Complaint: Inhalation Aero Soln Inhale 2 puffs into the lungs every 4 (four) hours. Disp:  Rfl:    Mometasone Furo-Formoterol Fum (DULERA) 200-5 MCG/ACT Inhalation Aerosol Inhale 2 puffs into the lungs 2 (two) times daily.  Disp:  Rfl:    Mometasone Furoate 220 MCG/IN Smokeless tobacco: Never Used    Alcohol Use: Yes  0.0 oz/week    0 Standard drinks or equivalent per week         Comment: 3 drinks a month     Drug Use: No    Sexual Activity: Not on file   Not on file  Other Topics Concern    Caffeine Concern Yes    Com Christopher Maynard Father    • benign prostatic hypertrophy[other] [OTHER] Father      x4   • CABG[other] [OTHER] Father    • Heart Disease Father    • Heart Disorder Mother    • Other[other] Christopher Maynard Mother    • Asthma Mother    • Hypertension Mother    • Heart Disea reviewed and are negative    Physical Exam:   General appearance: alert, appears stated age and cooperative  Head: Normocephalic, without obvious abnormality, atraumatic  Eyes: conjunctivae/corneas clear. PERRL, EOM's intact. Fundi benign.   Lungs: clear to TCHDLRATIO 2.94 04/27/2017 10:11 AM       Patient was instructed to continue wearing their CPaP as recommended. Goals for next month:  1. Keep a food log. 2. Drink 48-64 ounces of non-caloric beverages per day. No fruit juices or regular soda.   3. I

## 2017-06-15 ENCOUNTER — OFFICE VISIT (OUTPATIENT)
Dept: SURGERY | Facility: CLINIC | Age: 62
End: 2017-06-15

## 2017-06-15 VITALS — WEIGHT: 288.88 LBS | BODY MASS INDEX: 40.44 KG/M2 | HEIGHT: 71 IN

## 2017-06-15 DIAGNOSIS — E66.01 MORBID OBESITY WITH BMI OF 40.0-44.9, ADULT (HCC): Primary | ICD-10-CM

## 2017-06-15 PROCEDURE — 97803 MED NUTRITION INDIV SUBSEQ: CPT | Performed by: DIETITIAN, REGISTERED

## 2017-06-15 NOTE — PROGRESS NOTES
[address] [facility] AND [facility] CL[facility]  [address] [place] [address]  Dept: [phone]  Loc: [phone]    06/15/2017      Bariatric Follow-up Nutrition Session    Elisa Crawford is a 57year old male.      Ass Date   B12 485 04/27/2017       Lab Results  Component Value Date   VITD 40.3 04/27/2017       Lab Results  Component Value Date/Time   THIAMINE 134 04/27/2017 10:11 AM      No results found for: VITB1    Meds:     Current outpatient prescriptions:   •  Gl , Rfl:   •  Mexiletine HCl 150 MG Oral Cap, Take 150 mg by mouth 2 (two) times daily. , Disp: , Rfl:   •  Nebivolol HCl (BYSTOLIC) 2.5 MG Oral Tab, Take 2.5 mg by mouth every evening., Disp: , Rfl:   •  Montelukast Sodium (SINGULAIR) 10 MG Oral Tab, Take 10 Pt plans to measure 0.5-1 C fruit portions. 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 \" Discussed dessert alternatives (i.e. sugar-free jello, pudding, protein shake, yogurt); pt agreeable.  Since pt

## 2017-06-20 ENCOUNTER — TELEPHONE (OUTPATIENT)
Dept: SURGERY | Facility: CLINIC | Age: 62
End: 2017-06-20

## 2017-06-20 ENCOUNTER — PRIOR ORIGINAL RECORDS (OUTPATIENT)
Dept: OTHER | Age: 62
End: 2017-06-20

## 2017-06-20 ENCOUNTER — OFFICE VISIT (OUTPATIENT)
Dept: SURGERY | Facility: CLINIC | Age: 62
End: 2017-06-20

## 2017-06-20 VITALS
OXYGEN SATURATION: 96 % | HEIGHT: 71 IN | HEART RATE: 76 BPM | DIASTOLIC BLOOD PRESSURE: 63 MMHG | WEIGHT: 287.5 LBS | BODY MASS INDEX: 40.25 KG/M2 | SYSTOLIC BLOOD PRESSURE: 112 MMHG

## 2017-06-20 PROBLEM — E66.01 MORBIDLY OBESE (HCC): Status: ACTIVE | Noted: 2017-06-20

## 2017-06-20 NOTE — TELEPHONE ENCOUNTER
Left message for patient to call back regarding appointment on 17 July 2017. Patient was originally scheduled for a follow up, changed to liquid protein.

## 2017-06-20 NOTE — PROGRESS NOTES
[address], [address] [address]  Dept: [phone]    6/20/2017    BARIATRIC EXISTING PATIENT/FOLLOW UP    HPI:  Francesco Brand is a 57year old-year old male who pr expected recovery the importance of lifestyle change and finally potential risks.   He is going to follow-up with me in 1 month I want him to see his cardiologist Dr. Ryder Valera regarding his Xarelto prior to that    Plan: Plan for potential gastric bypass Brigida

## 2017-06-23 ENCOUNTER — TELEPHONE (OUTPATIENT)
Dept: SURGERY | Facility: CLINIC | Age: 62
End: 2017-06-23

## 2017-07-12 ENCOUNTER — PRIOR ORIGINAL RECORDS (OUTPATIENT)
Dept: OTHER | Age: 62
End: 2017-07-12

## 2017-07-17 ENCOUNTER — OFFICE VISIT (OUTPATIENT)
Dept: SURGERY | Facility: CLINIC | Age: 62
End: 2017-07-17

## 2017-07-17 VITALS — WEIGHT: 289.5 LBS | HEIGHT: 71 IN | BODY MASS INDEX: 40.53 KG/M2

## 2017-07-17 DIAGNOSIS — E66.01 MORBID OBESITY WITH BMI OF 40.0-44.9, ADULT (HCC): Primary | ICD-10-CM

## 2017-07-17 PROCEDURE — 97803 MED NUTRITION INDIV SUBSEQ: CPT | Performed by: DIETITIAN, REGISTERED

## 2017-07-17 NOTE — PROGRESS NOTES
[address] [place] [address] AND WEIGHT LOSS CLINIC  [address] [place] [address]  Dept: [phone]  Loc: [phone]    07/17/17    Bariatric Liquid Protein Nutrition Session    Pepito Morales is a 57year old male    Asse HGBA1C:    Lab Results  Component Value Date   A1C 6.2 (H) 04/27/2017   A1C 6.0 (H) 07/03/2015    (H) 04/27/2017       Lipid Panel:    Lab Results  Component Value Date   CHOLEST 144 04/27/2017   TRIG 86 04/27/2017   HDL 49 04/27/2017   LDL 78 04 Inhalation Solution, Take 3 mL by nebulization 2 (two) times daily as needed. , Disp: , Rfl:   •  Pravastatin Sodium (PRAVACHOL) 20 MG Oral Tab, Take 20 mg by mouth nightly., Disp: , Rfl:   •  Tiotropium Bromide Monohydrate (SPIRIVA HANDIHALER) 18 MCG Inhal quality has been fair. Per pt and wife, many meals have been fast food as wife has been cooking less since starting radiation treatment. Continues to try and choose healthier snacks at night, but struggles with occasional sweets.  Brainstormed simple/convie

## 2017-07-24 ENCOUNTER — OFFICE VISIT (OUTPATIENT)
Dept: SURGERY | Facility: CLINIC | Age: 62
End: 2017-07-24

## 2017-07-24 VITALS
RESPIRATION RATE: 18 BRPM | OXYGEN SATURATION: 93 % | WEIGHT: 290.25 LBS | DIASTOLIC BLOOD PRESSURE: 59 MMHG | SYSTOLIC BLOOD PRESSURE: 122 MMHG | BODY MASS INDEX: 40.64 KG/M2 | HEIGHT: 71 IN | HEART RATE: 69 BPM

## 2017-07-24 DIAGNOSIS — K21.9 GASTROESOPHAGEAL REFLUX DISEASE WITHOUT ESOPHAGITIS: ICD-10-CM

## 2017-07-24 DIAGNOSIS — E66.01 MORBID OBESITY WITH BMI OF 40.0-44.9, ADULT (HCC): ICD-10-CM

## 2017-07-24 DIAGNOSIS — E78.00 HYPERCHOLESTEROLEMIA: ICD-10-CM

## 2017-07-24 DIAGNOSIS — I10 ESSENTIAL HYPERTENSION: Primary | ICD-10-CM

## 2017-07-24 DIAGNOSIS — K21.00 GASTROESOPHAGEAL REFLUX DISEASE WITH ESOPHAGITIS: ICD-10-CM

## 2017-07-24 PROCEDURE — 99214 OFFICE O/P EST MOD 30 MIN: CPT | Performed by: INTERNAL MEDICINE

## 2017-07-24 RX ORDER — PANTOPRAZOLE SODIUM 40 MG/1
40 TABLET, DELAYED RELEASE ORAL
Qty: 90 TABLET | Refills: 3 | Status: ON HOLD | OUTPATIENT
Start: 2017-07-24 | End: 2017-08-14 | Stop reason: ALTCHOICE

## 2017-07-24 NOTE — PROGRESS NOTES
[address], [address] [facility] [address]  Dept: [phone]     Date:   2017    Patient:  Marily Ram  :      10/12/1955  MRN:      [id number]    Chief Complaint: mouth nightly. Disp:  Rfl:    Albuterol Sulfate HFA (PROAIR HFA) 108 (90 Base) MCG/ACT Inhalation Aero Soln Inhale 2 puffs into the lungs every 4 (four) hours.  Disp:  Rfl:    Mometasone Furo-Formoterol Fum (DULERA) 200-5 MCG/ACT Inhalation Aerosol Inhale Occupational History  None on file     Social History Main Topics   Smoking status: Never Smoker    Smokeless tobacco: Never Used    Alcohol use Yes  0.0 oz/week     Comment: 3 drinks a month     Drug use: No    Sexual activity: Not on file     Other T Caloric Intake:     · Average CHO Intake: 110  · Is patient exercising? yes  · Type of exercise?  Goes to [employer]    Eating Habits  · Patient states the following:  · Eats 2-3 meal(s) per day  · Length of time it takes to consume a edema  Pulses: 2+ and symmetric  Skin: Skin color, texture, turgor normal. No rashes or lesions    ASSESSMENT     HYPERTENSION:  The patient's blood pressure has been well controlled.   he has been checking it as instructed and has remained in relatively go psych  Saw pulmonary  Saw cards    Labs done    [place] over pre and post ob meds  Surgery Aug 14, 2017    Has surgery date          Darnell Gibbs MD

## 2017-08-01 ENCOUNTER — OFFICE VISIT (OUTPATIENT)
Dept: SURGERY | Facility: CLINIC | Age: 62
End: 2017-08-01

## 2017-08-01 ENCOUNTER — TELEPHONE (OUTPATIENT)
Dept: SURGERY | Facility: CLINIC | Age: 62
End: 2017-08-01

## 2017-08-01 VITALS
WEIGHT: 288 LBS | HEART RATE: 56 BPM | HEIGHT: 71 IN | SYSTOLIC BLOOD PRESSURE: 129 MMHG | OXYGEN SATURATION: 94 % | DIASTOLIC BLOOD PRESSURE: 70 MMHG | BODY MASS INDEX: 40.32 KG/M2

## 2017-08-01 NOTE — PROGRESS NOTES
[address], [address] [address]  Dept: [phone]    8/1/2017    BARIATRIC EXISTING PATIENT/FOLLOW UP    HPI:  Pippa Frank is a 57year old-year old male who pre recently completed radiation and chemotherapy for breast cancer. Breathing is unlabored his pulse is normal abdomen is soft faint scar near the umbilicus.   No peripheral edema    ASSESSMENT:  Morbid obesity multiple weight related comorbidities scheduled

## 2017-08-04 ENCOUNTER — APPOINTMENT (OUTPATIENT)
Dept: LAB | Facility: HOSPITAL | Age: 62
End: 2017-08-04
Attending: INTERNAL MEDICINE
Payer: COMMERCIAL

## 2017-08-04 DIAGNOSIS — E66.01 MORBID OBESITY WITH BMI OF 40.0-44.9, ADULT (HCC): ICD-10-CM

## 2017-08-04 DIAGNOSIS — Z99.89 OSA ON CPAP: ICD-10-CM

## 2017-08-04 DIAGNOSIS — G47.33 OSA ON CPAP: ICD-10-CM

## 2017-08-04 DIAGNOSIS — R53.82 CHRONIC FATIGUE: ICD-10-CM

## 2017-08-04 DIAGNOSIS — I10 ESSENTIAL HYPERTENSION: ICD-10-CM

## 2017-08-04 DIAGNOSIS — K21.00 GASTROESOPHAGEAL REFLUX DISEASE WITH ESOPHAGITIS: ICD-10-CM

## 2017-08-04 LAB
CHOLEST SMN-MCNC: 139 MG/DL (ref ?–200)
FOLATE (FOLIC ACID), SERUM: 29.2 NG/ML (ref 8.7–24)
HDLC SERPL-MCNC: 44 MG/DL (ref 45–?)
HDLC SERPL: 3.16 {RATIO} (ref ?–4.97)
LDLC SERPL CALC-MCNC: 78 MG/DL (ref ?–130)
LDLC SERPL-MCNC: 17 MG/DL (ref 5–40)
NONHDLC SERPL-MCNC: 95 MG/DL (ref ?–130)
TRIGLYCERIDES: 84 MG/DL (ref ?–150)

## 2017-08-04 PROCEDURE — 84425 ASSAY OF VITAMIN B-1: CPT

## 2017-08-04 PROCEDURE — 80061 LIPID PANEL: CPT

## 2017-08-04 PROCEDURE — 36415 COLL VENOUS BLD VENIPUNCTURE: CPT

## 2017-08-04 PROCEDURE — 82746 ASSAY OF FOLIC ACID SERUM: CPT

## 2017-08-06 LAB — VITAMIN B1 (THIAMINE), WHOLE B: 131 NMOL/L

## 2017-08-11 ENCOUNTER — LAB ENCOUNTER (OUTPATIENT)
Dept: LAB | Facility: HOSPITAL | Age: 62
End: 2017-08-11
Attending: SURGERY
Payer: COMMERCIAL

## 2017-08-11 ENCOUNTER — APPOINTMENT (OUTPATIENT)
Dept: LAB | Facility: HOSPITAL | Age: 62
End: 2017-08-11
Attending: SURGERY
Payer: COMMERCIAL

## 2017-08-11 DIAGNOSIS — Z01.818 PREOPERATIVE TESTING: ICD-10-CM

## 2017-08-11 LAB
ANION GAP SERPL CALC-SCNC: 10 MMOL/L (ref 0–18)
ANTIBODY SCREEN: NEGATIVE
BASOPHILS # BLD: 0 K/UL (ref 0–0.2)
BASOPHILS NFR BLD: 1 %
BUN SERPL-MCNC: 21 MG/DL (ref 8–20)
BUN/CREAT SERPL: 21.4 (ref 10–20)
CALCIUM SERPL-MCNC: 9.7 MG/DL (ref 8.5–10.5)
CHLORIDE SERPL-SCNC: 98 MMOL/L (ref 95–110)
CO2 SERPL-SCNC: 25 MMOL/L (ref 22–32)
CREAT SERPL-MCNC: 0.98 MG/DL (ref 0.5–1.5)
EOSINOPHIL # BLD: 0.2 K/UL (ref 0–0.7)
EOSINOPHIL NFR BLD: 3 %
ERYTHROCYTE [DISTWIDTH] IN BLOOD BY AUTOMATED COUNT: 13.9 % (ref 11–15)
GLUCOSE SERPL-MCNC: 93 MG/DL (ref 70–99)
HCT VFR BLD AUTO: 45.7 % (ref 41–52)
HGB BLD-MCNC: 15.5 G/DL (ref 13.5–17.5)
LYMPHOCYTES # BLD: 1.4 K/UL (ref 1–4)
LYMPHOCYTES NFR BLD: 30 %
MCH RBC QN AUTO: 30.1 PG (ref 27–32)
MCHC RBC AUTO-ENTMCNC: 33.9 G/DL (ref 32–37)
MCV RBC AUTO: 88.7 FL (ref 80–100)
MONOCYTES # BLD: 0.8 K/UL (ref 0–1)
MONOCYTES NFR BLD: 17 %
NEUTROPHILS # BLD AUTO: 2.2 K/UL (ref 1.8–7.7)
NEUTROPHILS NFR BLD: 49 %
OSMOLALITY UR CALC.SUM OF ELEC: 279 MOSM/KG (ref 275–295)
PLATELET # BLD AUTO: 182 K/UL (ref 140–400)
PMV BLD AUTO: 9.2 FL (ref 7.4–10.3)
POTASSIUM SERPL-SCNC: 5.2 MMOL/L (ref 3.3–5.1)
RBC # BLD AUTO: 5.15 M/UL (ref 4.5–5.9)
RH BLOOD TYPE: POSITIVE
SODIUM SERPL-SCNC: 133 MMOL/L (ref 136–144)
WBC # BLD AUTO: 4.5 K/UL (ref 4–11)

## 2017-08-11 PROCEDURE — 86850 RBC ANTIBODY SCREEN: CPT

## 2017-08-11 PROCEDURE — 86901 BLOOD TYPING SEROLOGIC RH(D): CPT

## 2017-08-11 PROCEDURE — 36415 COLL VENOUS BLD VENIPUNCTURE: CPT

## 2017-08-11 PROCEDURE — 86900 BLOOD TYPING SEROLOGIC ABO: CPT

## 2017-08-11 PROCEDURE — 85025 COMPLETE CBC W/AUTO DIFF WBC: CPT

## 2017-08-11 PROCEDURE — 93005 ELECTROCARDIOGRAM TRACING: CPT

## 2017-08-11 PROCEDURE — 93010 ELECTROCARDIOGRAM REPORT: CPT | Performed by: SURGERY

## 2017-08-11 PROCEDURE — 80048 BASIC METABOLIC PNL TOTAL CA: CPT

## 2017-08-11 RX ORDER — PREDNISONE 20 MG/1
60 TABLET ORAL DAILY
COMMUNITY
Start: 2017-08-11 | End: 2017-08-17

## 2017-08-14 ENCOUNTER — ANESTHESIA (OUTPATIENT)
Dept: SURGERY | Facility: HOSPITAL | Age: 62
DRG: 620 | End: 2017-08-14
Payer: COMMERCIAL

## 2017-08-14 ENCOUNTER — APPOINTMENT (OUTPATIENT)
Dept: CV DIAGNOSTICS | Facility: HOSPITAL | Age: 62
DRG: 620 | End: 2017-08-14
Attending: INTERNAL MEDICINE
Payer: COMMERCIAL

## 2017-08-14 ENCOUNTER — SURGERY (OUTPATIENT)
Age: 62
End: 2017-08-14

## 2017-08-14 ENCOUNTER — OFFICE VISIT (OUTPATIENT)
Dept: INTERNAL MEDICINE CLINIC | Facility: HOSPITAL | Age: 62
End: 2017-08-14
Attending: SURGERY

## 2017-08-14 ENCOUNTER — ANESTHESIA EVENT (OUTPATIENT)
Dept: SURGERY | Facility: HOSPITAL | Age: 62
DRG: 620 | End: 2017-08-14
Payer: COMMERCIAL

## 2017-08-14 ENCOUNTER — HOSPITAL ENCOUNTER (INPATIENT)
Facility: HOSPITAL | Age: 62
LOS: 3 days | Discharge: HOME | DRG: 620 | End: 2017-08-17
Attending: SURGERY | Admitting: SURGERY
Payer: COMMERCIAL

## 2017-08-14 ENCOUNTER — TELEPHONE (OUTPATIENT)
Dept: FAMILY MEDICINE CLINIC | Facility: CLINIC | Age: 62
End: 2017-08-14

## 2017-08-14 DIAGNOSIS — IMO0001 EXPOSURE: Primary | ICD-10-CM

## 2017-08-14 DIAGNOSIS — Z01.818 PREOPERATIVE TESTING: Primary | ICD-10-CM

## 2017-08-14 PROBLEM — I42.0 DILATED CARDIOMYOPATHY (HCC): Chronic | Status: ACTIVE | Noted: 2017-08-14

## 2017-08-14 PROBLEM — E66.9 OBESITY: Status: ACTIVE | Noted: 2017-08-14

## 2017-08-14 LAB
ANION GAP SERPL CALC-SCNC: 13 MMOL/L (ref 0–18)
APTT PPP: 20.9 SECONDS (ref 23.2–35.3)
BASOPHILS # BLD: 0 K/UL (ref 0–0.2)
BASOPHILS NFR BLD: 0 %
BUN SERPL-MCNC: 25 MG/DL (ref 8–20)
BUN/CREAT SERPL: 28.1 (ref 10–20)
CALCIUM SERPL-MCNC: 8.8 MG/DL (ref 8.5–10.5)
CHLORIDE SERPL-SCNC: 101 MMOL/L (ref 95–110)
CK SERPL-CCNC: 102 U/L (ref 49–397)
CO2 SERPL-SCNC: 21 MMOL/L (ref 22–32)
CREAT SERPL-MCNC: 0.89 MG/DL (ref 0.5–1.5)
EOSINOPHIL # BLD: 0 K/UL (ref 0–0.7)
EOSINOPHIL NFR BLD: 0 %
ERYTHROCYTE [DISTWIDTH] IN BLOOD BY AUTOMATED COUNT: 14.5 % (ref 11–15)
GLUCOSE SERPL-MCNC: 137 MG/DL (ref 70–99)
HCT VFR BLD AUTO: 42.6 % (ref 41–52)
HGB BLD-MCNC: 14.2 G/DL (ref 13.5–17.5)
HIV1+2 AB SPEC QL IA.RAPID: NONREACTIVE
LYMPHOCYTES # BLD: 0.5 K/UL (ref 1–4)
LYMPHOCYTES NFR BLD: 4 %
MAGNESIUM SERPL-MCNC: 1.9 MG/DL (ref 1.8–2.5)
MCH RBC QN AUTO: 29.8 PG (ref 27–32)
MCHC RBC AUTO-ENTMCNC: 33.3 G/DL (ref 32–37)
MCV RBC AUTO: 89.5 FL (ref 80–100)
MONOCYTES # BLD: 0.8 K/UL (ref 0–1)
MONOCYTES NFR BLD: 7 %
NEUTROPHILS # BLD AUTO: 11.3 K/UL (ref 1.8–7.7)
NEUTROPHILS NFR BLD: 90 %
OSMOLALITY UR CALC.SUM OF ELEC: 287 MOSM/KG (ref 275–295)
PLATELET # BLD AUTO: 172 K/UL (ref 140–400)
PMV BLD AUTO: 9.7 FL (ref 7.4–10.3)
POTASSIUM SERPL-SCNC: 4.3 MMOL/L (ref 3.3–5.1)
RBC # BLD AUTO: 4.76 M/UL (ref 4.5–5.9)
SODIUM SERPL-SCNC: 135 MMOL/L (ref 136–144)
TROPONIN I SERPL-MCNC: 0 NG/ML (ref ?–0.03)
TROPONIN I SERPL-MCNC: 0 NG/ML (ref ?–0.03)
WBC # BLD AUTO: 12.6 K/UL (ref 4–11)

## 2017-08-14 PROCEDURE — 0DJ08ZZ INSPECTION OF UPPER INTESTINAL TRACT, VIA NATURAL OR ARTIFICIAL OPENING ENDOSCOPIC: ICD-10-PCS | Performed by: SURGERY

## 2017-08-14 PROCEDURE — 0D164ZA BYPASS STOMACH TO JEJUNUM, PERCUTANEOUS ENDOSCOPIC APPROACH: ICD-10-PCS | Performed by: SURGERY

## 2017-08-14 PROCEDURE — 93306 TTE W/DOPPLER COMPLETE: CPT | Performed by: INTERNAL MEDICINE

## 2017-08-14 PROCEDURE — 86803 HEPATITIS C AB TEST: CPT

## 2017-08-14 PROCEDURE — 87340 HEPATITIS B SURFACE AG IA: CPT

## 2017-08-14 PROCEDURE — 99223 1ST HOSP IP/OBS HIGH 75: CPT | Performed by: INTERNAL MEDICINE

## 2017-08-14 PROCEDURE — 86701 HIV-1ANTIBODY: CPT

## 2017-08-14 RX ORDER — HYDROCODONE BITARTRATE AND ACETAMINOPHEN 5; 325 MG/1; MG/1
1 TABLET ORAL AS NEEDED
Status: DISCONTINUED | OUTPATIENT
Start: 2017-08-14 | End: 2017-08-14

## 2017-08-14 RX ORDER — LIDOCAINE HYDROCHLORIDE 10 MG/ML
INJECTION, SOLUTION EPIDURAL; INFILTRATION; INTRACAUDAL; PERINEURAL AS NEEDED
Status: DISCONTINUED | OUTPATIENT
Start: 2017-08-14 | End: 2017-08-14 | Stop reason: SURG

## 2017-08-14 RX ORDER — MORPHINE SULFATE 2 MG/ML
2 INJECTION, SOLUTION INTRAMUSCULAR; INTRAVENOUS EVERY 10 MIN PRN
Status: DISCONTINUED | OUTPATIENT
Start: 2017-08-14 | End: 2017-08-17

## 2017-08-14 RX ORDER — MORPHINE SULFATE 4 MG/ML
4 INJECTION, SOLUTION INTRAMUSCULAR; INTRAVENOUS EVERY 2 HOUR PRN
Status: DISCONTINUED | OUTPATIENT
Start: 2017-08-14 | End: 2017-08-17

## 2017-08-14 RX ORDER — BUPIVACAINE HYDROCHLORIDE 2.5 MG/ML
INJECTION, SOLUTION EPIDURAL; INFILTRATION; INTRACAUDAL AS NEEDED
Status: DISCONTINUED | OUTPATIENT
Start: 2017-08-14 | End: 2017-08-14

## 2017-08-14 RX ORDER — MORPHINE SULFATE 2 MG/ML
2 INJECTION, SOLUTION INTRAMUSCULAR; INTRAVENOUS EVERY 10 MIN PRN
Status: DISCONTINUED | OUTPATIENT
Start: 2017-08-14 | End: 2017-08-14

## 2017-08-14 RX ORDER — SODIUM CHLORIDE, SODIUM LACTATE, POTASSIUM CHLORIDE, CALCIUM CHLORIDE 600; 310; 30; 20 MG/100ML; MG/100ML; MG/100ML; MG/100ML
INJECTION, SOLUTION INTRAVENOUS CONTINUOUS
Status: DISCONTINUED | OUTPATIENT
Start: 2017-08-14 | End: 2017-08-17

## 2017-08-14 RX ORDER — HALOPERIDOL 5 MG/ML
0.25 INJECTION INTRAMUSCULAR ONCE AS NEEDED
Status: DISCONTINUED | OUTPATIENT
Start: 2017-08-14 | End: 2017-08-14

## 2017-08-14 RX ORDER — HYDROCODONE BITARTRATE AND ACETAMINOPHEN 5; 325 MG/1; MG/1
2 TABLET ORAL AS NEEDED
Status: DISCONTINUED | OUTPATIENT
Start: 2017-08-14 | End: 2017-08-14

## 2017-08-14 RX ORDER — MORPHINE SULFATE 4 MG/ML
4 INJECTION, SOLUTION INTRAMUSCULAR; INTRAVENOUS EVERY 10 MIN PRN
Status: DISCONTINUED | OUTPATIENT
Start: 2017-08-14 | End: 2017-08-14

## 2017-08-14 RX ORDER — ROCURONIUM BROMIDE 10 MG/ML
INJECTION, SOLUTION INTRAVENOUS AS NEEDED
Status: DISCONTINUED | OUTPATIENT
Start: 2017-08-14 | End: 2017-08-14 | Stop reason: SURG

## 2017-08-14 RX ORDER — ACETAMINOPHEN 325 MG/1
650 TABLET ORAL ONCE
Status: DISCONTINUED | OUTPATIENT
Start: 2017-08-14 | End: 2017-08-14 | Stop reason: HOSPADM

## 2017-08-14 RX ORDER — MORPHINE SULFATE 2 MG/ML
1 INJECTION, SOLUTION INTRAMUSCULAR; INTRAVENOUS EVERY 2 HOUR PRN
Status: DISCONTINUED | OUTPATIENT
Start: 2017-08-14 | End: 2017-08-17

## 2017-08-14 RX ORDER — FAMOTIDINE 20 MG/1
20 TABLET ORAL 2 TIMES DAILY
Status: DISCONTINUED | OUTPATIENT
Start: 2017-08-14 | End: 2017-08-17

## 2017-08-14 RX ORDER — MORPHINE SULFATE 4 MG/ML
6 INJECTION, SOLUTION INTRAMUSCULAR; INTRAVENOUS EVERY 10 MIN PRN
Status: DISCONTINUED | OUTPATIENT
Start: 2017-08-14 | End: 2017-08-17

## 2017-08-14 RX ORDER — SODIUM CHLORIDE, SODIUM LACTATE, POTASSIUM CHLORIDE, CALCIUM CHLORIDE 600; 310; 30; 20 MG/100ML; MG/100ML; MG/100ML; MG/100ML
INJECTION, SOLUTION INTRAVENOUS CONTINUOUS
Status: DISCONTINUED | OUTPATIENT
Start: 2017-08-14 | End: 2017-08-14

## 2017-08-14 RX ORDER — HYDROMORPHONE HYDROCHLORIDE 1 MG/ML
0.2 INJECTION, SOLUTION INTRAMUSCULAR; INTRAVENOUS; SUBCUTANEOUS EVERY 5 MIN PRN
Status: DISCONTINUED | OUTPATIENT
Start: 2017-08-14 | End: 2017-08-14

## 2017-08-14 RX ORDER — HEPARIN SODIUM 5000 [USP'U]/ML
5000 INJECTION, SOLUTION INTRAVENOUS; SUBCUTANEOUS ONCE
Status: COMPLETED | OUTPATIENT
Start: 2017-08-14 | End: 2017-08-14

## 2017-08-14 RX ORDER — ONDANSETRON 2 MG/ML
4 INJECTION INTRAMUSCULAR; INTRAVENOUS ONCE AS NEEDED
Status: ACTIVE | OUTPATIENT
Start: 2017-08-14 | End: 2017-08-14

## 2017-08-14 RX ORDER — MIDAZOLAM HYDROCHLORIDE 1 MG/ML
INJECTION INTRAMUSCULAR; INTRAVENOUS AS NEEDED
Status: DISCONTINUED | OUTPATIENT
Start: 2017-08-14 | End: 2017-08-14 | Stop reason: SURG

## 2017-08-14 RX ORDER — NEOSTIGMINE METHYLSULFATE 0.5 MG/ML
INJECTION INTRAVENOUS AS NEEDED
Status: DISCONTINUED | OUTPATIENT
Start: 2017-08-14 | End: 2017-08-14 | Stop reason: SURG

## 2017-08-14 RX ORDER — MORPHINE SULFATE 4 MG/ML
4 INJECTION, SOLUTION INTRAMUSCULAR; INTRAVENOUS EVERY 10 MIN PRN
Status: DISCONTINUED | OUTPATIENT
Start: 2017-08-14 | End: 2017-08-17

## 2017-08-14 RX ORDER — HYDROMORPHONE HYDROCHLORIDE 1 MG/ML
0.6 INJECTION, SOLUTION INTRAMUSCULAR; INTRAVENOUS; SUBCUTANEOUS EVERY 5 MIN PRN
Status: DISCONTINUED | OUTPATIENT
Start: 2017-08-14 | End: 2017-08-14

## 2017-08-14 RX ORDER — ONDANSETRON 2 MG/ML
4 INJECTION INTRAMUSCULAR; INTRAVENOUS EVERY 6 HOURS PRN
Status: DISCONTINUED | OUTPATIENT
Start: 2017-08-14 | End: 2017-08-17

## 2017-08-14 RX ORDER — HYDROMORPHONE HYDROCHLORIDE 1 MG/ML
0.6 INJECTION, SOLUTION INTRAMUSCULAR; INTRAVENOUS; SUBCUTANEOUS EVERY 5 MIN PRN
Status: DISCONTINUED | OUTPATIENT
Start: 2017-08-14 | End: 2017-08-17

## 2017-08-14 RX ORDER — FAMOTIDINE 10 MG/ML
20 INJECTION, SOLUTION INTRAVENOUS 2 TIMES DAILY
Status: DISCONTINUED | OUTPATIENT
Start: 2017-08-14 | End: 2017-08-17

## 2017-08-14 RX ORDER — HYDROMORPHONE HYDROCHLORIDE 1 MG/ML
0.4 INJECTION, SOLUTION INTRAMUSCULAR; INTRAVENOUS; SUBCUTANEOUS EVERY 5 MIN PRN
Status: DISCONTINUED | OUTPATIENT
Start: 2017-08-14 | End: 2017-08-14

## 2017-08-14 RX ORDER — NITROGLYCERIN 0.4 MG/1
0.4 TABLET SUBLINGUAL ONCE
Status: COMPLETED | OUTPATIENT
Start: 2017-08-14 | End: 2017-08-14

## 2017-08-14 RX ORDER — GLYCOPYRROLATE 0.2 MG/ML
INJECTION INTRAMUSCULAR; INTRAVENOUS AS NEEDED
Status: DISCONTINUED | OUTPATIENT
Start: 2017-08-14 | End: 2017-08-14 | Stop reason: SURG

## 2017-08-14 RX ORDER — SODIUM CHLORIDE 0.9 % (FLUSH) 0.9 %
10 SYRINGE (ML) INJECTION AS NEEDED
Status: DISCONTINUED | OUTPATIENT
Start: 2017-08-14 | End: 2017-08-17

## 2017-08-14 RX ORDER — METOPROLOL TARTRATE 5 MG/5ML
2.5 INJECTION INTRAVENOUS ONCE
Status: COMPLETED | OUTPATIENT
Start: 2017-08-14 | End: 2017-08-14

## 2017-08-14 RX ORDER — HYDROMORPHONE HYDROCHLORIDE 1 MG/ML
0.2 INJECTION, SOLUTION INTRAMUSCULAR; INTRAVENOUS; SUBCUTANEOUS EVERY 5 MIN PRN
Status: DISCONTINUED | OUTPATIENT
Start: 2017-08-14 | End: 2017-08-17

## 2017-08-14 RX ORDER — ONDANSETRON 2 MG/ML
4 INJECTION INTRAMUSCULAR; INTRAVENOUS ONCE AS NEEDED
Status: DISCONTINUED | OUTPATIENT
Start: 2017-08-14 | End: 2017-08-14

## 2017-08-14 RX ORDER — METOPROLOL TARTRATE 5 MG/5ML
2.5 INJECTION INTRAVENOUS ONCE
Status: DISCONTINUED | OUTPATIENT
Start: 2017-08-14 | End: 2017-08-14

## 2017-08-14 RX ORDER — HYDROCODONE BITARTRATE AND ACETAMINOPHEN 5; 325 MG/1; MG/1
1 TABLET ORAL AS NEEDED
Status: DISCONTINUED | OUTPATIENT
Start: 2017-08-14 | End: 2017-08-17

## 2017-08-14 RX ORDER — HYDROCODONE BITARTRATE AND ACETAMINOPHEN 5; 325 MG/1; MG/1
2 TABLET ORAL AS NEEDED
Status: DISCONTINUED | OUTPATIENT
Start: 2017-08-14 | End: 2017-08-17

## 2017-08-14 RX ORDER — MORPHINE SULFATE 2 MG/ML
2 INJECTION, SOLUTION INTRAMUSCULAR; INTRAVENOUS EVERY 2 HOUR PRN
Status: DISCONTINUED | OUTPATIENT
Start: 2017-08-14 | End: 2017-08-17

## 2017-08-14 RX ORDER — DEXTROSE AND SODIUM CHLORIDE 5; .45 G/100ML; G/100ML
INJECTION, SOLUTION INTRAVENOUS CONTINUOUS
Status: DISCONTINUED | OUTPATIENT
Start: 2017-08-14 | End: 2017-08-17

## 2017-08-14 RX ORDER — FAMOTIDINE 20 MG/1
20 TABLET ORAL ONCE
Status: DISCONTINUED | OUTPATIENT
Start: 2017-08-14 | End: 2017-08-14 | Stop reason: HOSPADM

## 2017-08-14 RX ORDER — METOCLOPRAMIDE 10 MG/1
10 TABLET ORAL ONCE
Status: DISCONTINUED | OUTPATIENT
Start: 2017-08-14 | End: 2017-08-14 | Stop reason: HOSPADM

## 2017-08-14 RX ORDER — HALOPERIDOL 5 MG/ML
0.25 INJECTION INTRAMUSCULAR ONCE AS NEEDED
Status: ACTIVE | OUTPATIENT
Start: 2017-08-14 | End: 2017-08-14

## 2017-08-14 RX ORDER — NALOXONE HYDROCHLORIDE 0.4 MG/ML
80 INJECTION, SOLUTION INTRAMUSCULAR; INTRAVENOUS; SUBCUTANEOUS AS NEEDED
Status: ACTIVE | OUTPATIENT
Start: 2017-08-14 | End: 2017-08-14

## 2017-08-14 RX ORDER — MORPHINE SULFATE 4 MG/ML
6 INJECTION, SOLUTION INTRAMUSCULAR; INTRAVENOUS EVERY 10 MIN PRN
Status: DISCONTINUED | OUTPATIENT
Start: 2017-08-14 | End: 2017-08-14

## 2017-08-14 RX ORDER — HYDROMORPHONE HYDROCHLORIDE 1 MG/ML
0.4 INJECTION, SOLUTION INTRAMUSCULAR; INTRAVENOUS; SUBCUTANEOUS EVERY 5 MIN PRN
Status: DISCONTINUED | OUTPATIENT
Start: 2017-08-14 | End: 2017-08-17

## 2017-08-14 RX ORDER — OMEPRAZOLE 20 MG/1
20 CAPSULE, DELAYED RELEASE ORAL
COMMUNITY
End: 2017-08-24 | Stop reason: ALTCHOICE

## 2017-08-14 RX ORDER — SODIUM CHLORIDE, SODIUM LACTATE, POTASSIUM CHLORIDE, CALCIUM CHLORIDE 600; 310; 30; 20 MG/100ML; MG/100ML; MG/100ML; MG/100ML
INJECTION, SOLUTION INTRAVENOUS CONTINUOUS PRN
Status: DISCONTINUED | OUTPATIENT
Start: 2017-08-14 | End: 2017-08-14 | Stop reason: SURG

## 2017-08-14 RX ORDER — ALBUTEROL SULFATE 90 UG/1
1 AEROSOL, METERED RESPIRATORY (INHALATION) EVERY 4 HOURS PRN
Status: DISCONTINUED | OUTPATIENT
Start: 2017-08-14 | End: 2017-08-17

## 2017-08-14 RX ORDER — MORPHINE SULFATE 4 MG/ML
5 INJECTION, SOLUTION INTRAMUSCULAR; INTRAVENOUS ONCE
Status: COMPLETED | OUTPATIENT
Start: 2017-08-14 | End: 2017-08-14

## 2017-08-14 RX ORDER — DEXAMETHASONE SODIUM PHOSPHATE 4 MG/ML
VIAL (ML) INJECTION AS NEEDED
Status: DISCONTINUED | OUTPATIENT
Start: 2017-08-14 | End: 2017-08-14 | Stop reason: SURG

## 2017-08-14 RX ORDER — SUCCINYLCHOLINE CHLORIDE 20 MG/ML
INJECTION INTRAMUSCULAR; INTRAVENOUS AS NEEDED
Status: DISCONTINUED | OUTPATIENT
Start: 2017-08-14 | End: 2017-08-14 | Stop reason: SURG

## 2017-08-14 RX ORDER — NITROGLYCERIN 20 MG/100ML
10 INJECTION INTRAVENOUS CONTINUOUS
Status: DISCONTINUED | OUTPATIENT
Start: 2017-08-14 | End: 2017-08-17

## 2017-08-14 RX ORDER — NALOXONE HYDROCHLORIDE 0.4 MG/ML
80 INJECTION, SOLUTION INTRAMUSCULAR; INTRAVENOUS; SUBCUTANEOUS AS NEEDED
Status: DISCONTINUED | OUTPATIENT
Start: 2017-08-14 | End: 2017-08-14

## 2017-08-14 RX ORDER — BACITRACIN 50000 [USP'U]/1
INJECTION, POWDER, LYOPHILIZED, FOR SOLUTION INTRAMUSCULAR AS NEEDED
Status: DISCONTINUED | OUTPATIENT
Start: 2017-08-14 | End: 2017-08-14

## 2017-08-14 RX ADMIN — ROCURONIUM BROMIDE 50 MG: 10 INJECTION, SOLUTION INTRAVENOUS at 12:32:00

## 2017-08-14 RX ADMIN — SUCCINYLCHOLINE CHLORIDE 160 MG: 20 INJECTION INTRAMUSCULAR; INTRAVENOUS at 12:29:00

## 2017-08-14 RX ADMIN — SODIUM CHLORIDE, SODIUM LACTATE, POTASSIUM CHLORIDE, CALCIUM CHLORIDE: 600; 310; 30; 20 INJECTION, SOLUTION INTRAVENOUS at 12:25:00

## 2017-08-14 RX ADMIN — LIDOCAINE HYDROCHLORIDE 50 MG: 10 INJECTION, SOLUTION EPIDURAL; INFILTRATION; INTRACAUDAL; PERINEURAL at 12:29:00

## 2017-08-14 RX ADMIN — MIDAZOLAM HYDROCHLORIDE 2 MG: 1 INJECTION INTRAMUSCULAR; INTRAVENOUS at 12:28:00

## 2017-08-14 RX ADMIN — DEXAMETHASONE SODIUM PHOSPHATE 4 MG: 4 MG/ML VIAL (ML) INJECTION at 13:05:00

## 2017-08-14 RX ADMIN — SODIUM CHLORIDE, SODIUM LACTATE, POTASSIUM CHLORIDE, CALCIUM CHLORIDE: 600; 310; 30; 20 INJECTION, SOLUTION INTRAVENOUS at 14:30:00

## 2017-08-14 RX ADMIN — ROCURONIUM BROMIDE 10 MG: 10 INJECTION, SOLUTION INTRAVENOUS at 13:57:00

## 2017-08-14 RX ADMIN — NEOSTIGMINE METHYLSULFATE 5 MG: 0.5 INJECTION INTRAVENOUS at 14:10:00

## 2017-08-14 RX ADMIN — GLYCOPYRROLATE 0.8 MG: 0.2 INJECTION INTRAMUSCULAR; INTRAVENOUS at 14:10:00

## 2017-08-14 RX ADMIN — ROCURONIUM BROMIDE 20 MG: 10 INJECTION, SOLUTION INTRAVENOUS at 13:28:00

## 2017-08-14 NOTE — PROGRESS NOTES
Lulu Santa / Wendy Gaviria / Rosana Camargo / Paula Gordon / Sophia Petersen / Bella Fernando - [phone]  Answering Service [phone]        Pt seen/examined for chest pain in PACU. Cards at bedside.     VSS, HR about 94, sats 95

## 2017-08-14 NOTE — CONSULTS
[facility] HOSP - [facility]    Report of Consultation    Robson Pierce Patient Status:  Surgery Admit    10/12/1955 MRN [id number]   Location [place] [address] Attending Janae Li MD   Hosp Day # 0 PCP DO MIKEY WATTS • Asthma    • Cellulitis and abscess of trunk    • COPD (chronic obstructive pulmonary disease) (HCC)    • Esophageal reflux    • Exposure to radiation     completed radiation 2016 for prostate cancer   • Extrinsic asthma, unspecified    • Gallbladder pr • Heart Disorder Mother    • Asthma Mother    • Hypertension Mother    • Heart Disease Mother    • Other Jing Canela Mother    • Other [OTHER] Sister      REUMATOID ARTHRITIS   • Cancer Neg    • Stroke Neg      Social History:  Smoking status: Never Smoker ipratropium-albuterol (DUONEB) 0.5-2.5 (3) MG/3ML Inhalation Solution Take 3 mL by nebulization 2 (two) times daily as needed.        Review of Systems:   Review of Systems    Physical Exam:   Vital Signs:   height is 180.3 cm (5' 11\") and weight is 273 correct abnormalities    VT/ICD  Interrogation of ICD        Clau Singh MD  8/14/2017    Dr Daniel Zarate surgery were updated. Dr Efrem Velasco updated.    Wife at bedside updated     CCT >1 hour (between 3:52-5:12pm )

## 2017-08-14 NOTE — ANESTHESIA PREPROCEDURE EVALUATION
Anesthesia PreOp Note    HPI:     Kaylin Bustillo is a 57year old male who presents for preoperative consultation requested by: Re Yu MD    Date of Surgery: 8/14/2017    Procedure(s):  BARIATRIC GASTRIC BYPASS LAPAROSCOPIC AMALIA-EN-Y  Indicatio Date Noted: 12/17/2013      Boil, back         Date Noted: 12/17/2013      Right-sided chest wall pain         Date Noted: 11/28/2013      Traumatic partial tear of right biceps tendon         Date Noted: 11/21/2013      Brachial neuritis or radiculitis NO Scietnific, magnet response inhibit                therapy, not pacemaker dependent  2012: CHOLECYSTECTOMY      Comment: Dr. Magdalene Delgado  5/7/2015: COLONOSCOPY N/A      Comment: Procedure: COLONOSCOPY;  Surgeon: Florina Martin MD;  Location: [facility] by mouth nightly. Disp:  Rfl:  8/12/2017   Tiotropium Bromide Monohydrate (SPIRIVA HANDIHALER) 18 MCG Inhalation Cap Inhale 18 mcg into the lungs daily.  Disp:  Rfl:  8/13/2017 at 2330   Nebivolol HCl (BYSTOLIC) 5 MG Oral Tab Take 5 mg by mouth every mornin Mother    • Heart Disease Mother    • Other Anselmo Gutierrez Mother    • Other [OTHER] Sister      REUMATOID ARTHRITIS   • Cancer Neg    • Stroke Neg        Social History  Social History   Marital status:   Spouse name: N/A    Years of education: N/A  Numbe sleep apnea on CPAP,   Cardiovascular   (+) hypertension well controlled, CAD,     NYHA Classification: II  Rhythm: irregular  Rate: abnormal  ROS comment: '15 xt neg  History of V tach,A fib,fainting  AICD /recomend to re program,  Cardiac clearance April

## 2017-08-14 NOTE — H&P
[address] Patient Status:  Surgery Admit    10/12/1955 MRN [id number]   Location [address] [facility] Attending Lakhwinder Aburto., MD   Hosp Day # 0 PCP DO MIKEY WATTS Unspecified sleep apnea     cpap   • V-tach [facility])      Past Surgical History:  No date: ANGIOGRAM  2009: CARDIAC DEFIBRILLATOR PLACEMENT      Comment: [place], magnet response inhibit                therapy, not pacemaker dependent  2012: Sophia Edwards Aerosol Inhale 2 puffs into the lungs 2 (two) times daily. Disp:  Rfl:  8/14/2017 at 0900   Mometasone Furoate 220 MCG/INH Inhalation Aerosol Powder, Breath Activated Inhale 2 puffs into the lungs 2 (two) times daily.  Disp:  Rfl:  8/14/2017 at 0900   Anali lb (123.8 kg), SpO2 94 %. HEENT: Exam is unremarkable. Without scleral icterus. Mucous membranes are moist. Pupils are equal and round, reactive to light and accommodate. Pupils are approximately 3mm and react to 2mm with reaction to light.   Oropharynx unspecified     Unspecified asthma(493.90)     Paroxysmal ventricular tachycardia (HCC)     Essential hypertension     Stiffness of joint, not elsewhere classified, pelvic region and thigh     Brachial neuritis or radiculitis NOS     Traumatic partial tear

## 2017-08-14 NOTE — OPERATIVE REPORT
Estela Mims / Shahla Holley / Jaison Gallego / Bozena Andrews / Yesenia Christina / Kerrie draft - [phone]  Answering Service [phone]        Preop Diagnosis: Morbid Obesity secondary to excess calories   Postop Diagnosis: Morbid lateral trocar, a 12 mm right paramedian trocar, and a 5 mm right upper quadrant trocar were all inserted under direct vision. We started by elevating the omentum up towards the head, allowing us to elevate the transverse mesocolon.   The ligament of Emile gastrotomy and removed through a left lateral trocar site. The stitch was cut leaving only the anvil and the gastric pouch in good position. We then identified our Sonya limb which reached up to this area without any tension.   A 2-0 silk antimesenteric sutures. Patient tolerated the procedure well and was brought to the postanesthesia recovery unit in stable condition. Guadalupe Riedel

## 2017-08-14 NOTE — BRIEF OP NOTE
Pre-Operative Diagnosis: Morbid obesity, HTN, hypercholesterolemia, obstructive sleep apnea, GERD     Post-Operative Diagnosis: Morbid obesity, HTN, hypercholesterolemia, obstructive sleep apnea, GERD     Procedure Performed:   Procedure(s):  Laparoscop

## 2017-08-14 NOTE — ANESTHESIA POSTPROCEDURE EVALUATION
Patient: Kaylin Bustillo    Procedure Summary     Date:  08/14/17 Room / Location:  [facility] MAIN OR [address] [place] [address] MAIN OR    Anesthesia Start:  3702 Anesthesia Stop:  1482    Procedure:  BARIATRIC GASTRIC BYPASS LAPAROSCOPIC AMALIA-EN-Y (N/A Abdomen) Diagnosis:  (Mor

## 2017-08-14 NOTE — HISTORICAL OFFICE NOTE
Gaurang Hernandes  : 10/12/1955  ACCOUNT:  [id number]  [phone]  PCP: Dr. Scott Shaw DATE: 2017  DICTATED BY:  Rob Portillo M.D.]    CHIEF COMPLAINT: [Followup of .  CAD, of native vessels, nonobstructive, Followup of Dilated cardiomy CONS: no fever, chills, or recent weight changes. EYES: denies significant visual changes. ENMT: denies difficulties with hearing, otherwise negative. CV: denies claudication. RESP: denies chronic cough. GI: denies melena, hematochezia. : no hematuria.  I DECISION MAKING: I told him he should be taking vitamin D3. He is waiting to talk to the bariatric team before he does that. He needs a lipid panel. His blood pressure is at goal. He is in sinus rhythm.  He was just put on Xarelto by Dr. Ryder Valera because he h 08/18/14 Abdirashid-24               300MG     twice daily                              08/06/14 Omeprazole            40MG      1 CAPSULE DAILY.                          08/06/14 Spiriva HandiHaler    18MCG     AS DIRECTED.                             01/29/14 D INTERPRETATION: On the right, there is mild plaque buildup in the distal common carotid artery extending into the proximal internal carotid artery. This is less than 2 mm in thickness.  Flow, velocities, and ICA/CCA ratio is normal consistent with a 1 to 15

## 2017-08-14 NOTE — TELEPHONE ENCOUNTER
Patient is in hospital at [place] for bariatric surgery for hopefully two days. Just spoke to his surgeon. No complications from surgery yet. Please schedule hospital follow up once he is discharged.

## 2017-08-14 NOTE — CONSULTS
[facility] [place] [facility]    History & Physical    Date:  8/11/2017   Date of Admission:  8/14/2017      Chief Complaint:   Nicky Fisher is a(n) 57year old male with chest pain status post gastric bypass surgery.     HPI:   The patient has a history of Past Surgical History:  No date: ANGIOGRAM  2009: CARDIAC DEFIBRILLATOR PLACEMENT      Comment: [place] Scietnific, magnet response inhibit                therapy, not pacemaker dependent  2012: CHOLECYSTECTOMY      Comment: Dr. Manju Kennedy  5/7/2015: COLO Mometasone Furo-Formoterol Fum (DULERA) 200-5 MCG/ACT Inhalation Aerosol Inhale 2 puffs into the lungs 2 (two) times daily. Mometasone Furoate 220 MCG/INH Inhalation Aerosol Powder, Breath Activated Inhale 2 puffs into the lungs 2 (two) times daily. Lungs clear to auscultation and percussion. Cardiac regular rate and rhythm no murmur. Abdomen notable for minimal tenderness and very subtle bowel sounds, without hepatosplenomegaly and no mass appreciable.    Extremities without clubbing cyanosis nor delighted to assist with Favio's care.       Denia Calderon MD  Medical Director, Critical Care, [address] [place] [address]  Medical Director, [facility]  Pager: [phone]

## 2017-08-14 NOTE — TELEPHONE ENCOUNTER
Call ot shady/spouse-listed on hipaa consent-advised dr davis received update from pt's surgeon this afternoon.  Dr davis requests once pt is discharged that she or pt call our ofc to schedule hosp follow up visit with dr davis,   Fredi [facility] pt just got out of

## 2017-08-14 NOTE — ANESTHESIA POSTPROCEDURE EVALUATION
Patient: Kary Stanford    Procedure Summary     Date:  08/14/17 Room / Location:  [facility] MAIN OR  / [facility] MAIN OR    Anesthesia Start:  0139 Anesthesia Stop:  4040    Procedure:  BARIATRIC GASTRIC BYPASS LAPAROSCOPIC AMALIA-EN-Y (N/A Abdomen) Diagnosis:  (Mor

## 2017-08-15 ENCOUNTER — APPOINTMENT (OUTPATIENT)
Dept: GENERAL RADIOLOGY | Facility: HOSPITAL | Age: 62
DRG: 620 | End: 2017-08-15
Attending: INTERNAL MEDICINE
Payer: COMMERCIAL

## 2017-08-15 LAB
ANION GAP SERPL CALC-SCNC: 4 MMOL/L (ref 0–18)
BASOPHILS # BLD: 0 K/UL (ref 0–0.2)
BASOPHILS NFR BLD: 0 %
BUN SERPL-MCNC: 17 MG/DL (ref 8–20)
BUN/CREAT SERPL: 17.5 (ref 10–20)
CALCIUM SERPL-MCNC: 8.2 MG/DL (ref 8.5–10.5)
CHLORIDE SERPL-SCNC: 102 MMOL/L (ref 95–110)
CO2 SERPL-SCNC: 27 MMOL/L (ref 22–32)
CREAT SERPL-MCNC: 0.97 MG/DL (ref 0.5–1.5)
EOSINOPHIL # BLD: 0 K/UL (ref 0–0.7)
EOSINOPHIL NFR BLD: 0 %
ERYTHROCYTE [DISTWIDTH] IN BLOOD BY AUTOMATED COUNT: 14.5 % (ref 11–15)
GLUCOSE SERPL-MCNC: 174 MG/DL (ref 70–99)
HBV SURFACE AG SERPL QL IA: NONREACTIVE
HCT VFR BLD AUTO: 37.6 % (ref 41–52)
HCV AB SERPL QL IA: NONREACTIVE
HGB BLD-MCNC: 12.6 G/DL (ref 13.5–17.5)
LYMPHOCYTES # BLD: 1 K/UL (ref 1–4)
LYMPHOCYTES NFR BLD: 12 %
MCH RBC QN AUTO: 30.2 PG (ref 27–32)
MCHC RBC AUTO-ENTMCNC: 33.6 G/DL (ref 32–37)
MCV RBC AUTO: 89.9 FL (ref 80–100)
MONOCYTES # BLD: 1.1 K/UL (ref 0–1)
MONOCYTES NFR BLD: 13 %
NEUTROPHILS # BLD AUTO: 6.4 K/UL (ref 1.8–7.7)
NEUTROPHILS NFR BLD: 75 %
OSMOLALITY UR CALC.SUM OF ELEC: 282 MOSM/KG (ref 275–295)
PLATELET # BLD AUTO: 154 K/UL (ref 140–400)
PMV BLD AUTO: 9.1 FL (ref 7.4–10.3)
POTASSIUM SERPL-SCNC: 5.7 MMOL/L (ref 3.3–5.1)
RBC # BLD AUTO: 4.18 M/UL (ref 4.5–5.9)
SODIUM SERPL-SCNC: 133 MMOL/L (ref 136–144)
TROPONIN I SERPL-MCNC: 0 NG/ML (ref ?–0.03)
WBC # BLD AUTO: 8.6 K/UL (ref 4–11)

## 2017-08-15 PROCEDURE — 99233 SBSQ HOSP IP/OBS HIGH 50: CPT | Performed by: INTERNAL MEDICINE

## 2017-08-15 PROCEDURE — 71010 XR CHEST AP PORTABLE  (CPT=71010): CPT | Performed by: INTERNAL MEDICINE

## 2017-08-15 RX ORDER — MEXILETINE HYDROCHLORIDE 150 MG/1
150 CAPSULE ORAL EVERY 8 HOURS SCHEDULED
Status: DISCONTINUED | OUTPATIENT
Start: 2017-08-15 | End: 2017-08-17

## 2017-08-15 RX ORDER — HEPARIN SODIUM 5000 [USP'U]/ML
5000 INJECTION, SOLUTION INTRAVENOUS; SUBCUTANEOUS EVERY 8 HOURS SCHEDULED
Status: DISCONTINUED | OUTPATIENT
Start: 2017-08-15 | End: 2017-08-17

## 2017-08-15 RX ORDER — NITROGLYCERIN 80 MG/1
1 PATCH TRANSDERMAL DAILY
Status: DISCONTINUED | OUTPATIENT
Start: 2017-08-15 | End: 2017-08-17

## 2017-08-15 RX ORDER — 0.9 % SODIUM CHLORIDE 0.9 %
VIAL (ML) INJECTION
Status: DISPENSED
Start: 2017-08-15 | End: 2017-08-15

## 2017-08-15 NOTE — DIETARY NOTE
NUTRITION NOTE:  Pt screened at nutrition risk d/t decreased po intake >5 days PTA. Intake intentionally limited d/t pt on liquid protein diet X 2 weeks per Pre-Gastric-Bypass protocol.  Pt is being followed closely by bariatric

## 2017-08-15 NOTE — PROGRESS NOTES
[facility]    Progress Note      Assessment and Plan:   1. Postoperative chest pain–likely related to surgery. No evidence of ischemia with 2 negative troponins and an EKG that does not reflect ischemia.   The patient will be weaned off ni 08/15/2017   CA 8.2 08/15/2017   PTT 20.9 08/14/2017   MG 1.9 08/14/2017   TROP 0.00 08/15/2017    08/14/2017     Chest x-ray–mild basilar atelectasis    Roma Tiwari MD  Medical Director, Critical Care, [facility]  Medical Director, [facility]

## 2017-08-15 NOTE — PROGRESS NOTES
[facility]  Cardiology Progress Note    Weston Elizondo Patient Status:  Inpatient    10/12/1955 MRN [id number]   Location [facility] 2W/SW Attending Oly Gómez MD   Hosp Day # 1 PCP THEO SOSA, DO       Assessment a Hypertrophy of prostate with urinary obstruction and other lower urinary tract symptoms (LUTS)     Bilateral hip pain     Bursitis of both hips     Neoplasm of uncertain behavior of stomach, intestines, and rectum     Gastroesophageal reflux disease withou TPROT    Recent Labs   Lab  08/14/17   1543  08/14/17   1709   TROP  0.00  0.00       Allergies:     Bee Venom               Anaphylaxis    Medications:    Current Facility-Administered Medications:  lactated ringers infusion  Intravenous Continuous   lact infusion 50mg in D5W 250ml 10 mcg/min Intravenous Continuous   Albuterol Sulfate  (90 Base) MCG/ACT inhaler 1 puff 1 puff Inhalation Q4H PRN   Fluticasone Furoate-Vilanterol (BREO ELLIPTA) 200-25 MCG/INH inhaler 1 puff 1 puff Inhalation Daily   ec

## 2017-08-15 NOTE — BH NUTRITION NOTE
Bariatric Inpatient Nutrition Note      Kaylin Bustillo is a 57year old male.     Procedure: Laparoscopic gastric bypass surgery  Surgery Date: 8.14.2017   Medical Diagnosis: Morbid obesity    Height:  Ht Readings from Last 1 Encounters:  08/14/17 : 5' Sodium (Porcine) 5000 UNIT/ML injection 5,000 Units 5,000 Units Subcutaneous Q8H [address]   lactated ringers infusion  Intravenous Continuous   [COMPLETED] CeFAZolin Sodium (ANCEF/KEFZOL) IVPB premix 2 g 2 g Intravenous Once   [COMPLETED] Heparin Sodium (Porcin sulfate (PF) 2 MG/ML injection 1 mg 1 mg Intravenous Q2H PRN   Or      morphINE sulfate (PF) 2 MG/ML injection 2 mg 2 mg Intravenous Q2H PRN   Or      morphINE sulfate (PF) 4 MG/ML injection 4 mg 4 mg Intravenous Q2H PRN   ondansetron HCl (ZOFRAN) injectio updated bloodwork 3 mo post op.      Phill Goldstein, MS, RD, LDN

## 2017-08-15 NOTE — PROGRESS NOTES
Sona Davis / [place]  Kip Smith / Awa Santos / Korin Hernandezr / Jorge Mancini / Shayna Adams - [phone]  Answering Service [phone]      Progress Note    Gena Mercado Patient Status:  Inpatient    10/12/1955 MRN [id number] injection 10 mL 10 mL Intravenous PRN   dextrose 5 %-0.45 % NaCl infusion  Intravenous Continuous   HYDROcodone-acetaminophen 7.5-325 MG/15ML solution 10 mL 10 mL Oral Q4H PRN   Or      HYDROcodone-acetaminophen 7.5-325 MG/15ML solution 20 mL 20 mL Oral Q4

## 2017-08-16 LAB
ANION GAP SERPL CALC-SCNC: 5 MMOL/L (ref 0–18)
BASOPHILS # BLD: 0 K/UL (ref 0–0.2)
BASOPHILS NFR BLD: 0 %
BUN SERPL-MCNC: 6 MG/DL (ref 8–20)
BUN/CREAT SERPL: 7.5 (ref 10–20)
CALCIUM SERPL-MCNC: 8.4 MG/DL (ref 8.5–10.5)
CHLORIDE SERPL-SCNC: 104 MMOL/L (ref 95–110)
CO2 SERPL-SCNC: 28 MMOL/L (ref 22–32)
CREAT SERPL-MCNC: 0.8 MG/DL (ref 0.5–1.5)
EOSINOPHIL # BLD: 0.1 K/UL (ref 0–0.7)
EOSINOPHIL NFR BLD: 2 %
ERYTHROCYTE [DISTWIDTH] IN BLOOD BY AUTOMATED COUNT: 14.2 % (ref 11–15)
GLUCOSE SERPL-MCNC: 122 MG/DL (ref 70–99)
HCT VFR BLD AUTO: 38.5 % (ref 41–52)
HGB BLD-MCNC: 13 G/DL (ref 13.5–17.5)
LYMPHOCYTES # BLD: 1.3 K/UL (ref 1–4)
LYMPHOCYTES NFR BLD: 21 %
MCH RBC QN AUTO: 30.3 PG (ref 27–32)
MCHC RBC AUTO-ENTMCNC: 33.7 G/DL (ref 32–37)
MCV RBC AUTO: 90 FL (ref 80–100)
MONOCYTES # BLD: 1 K/UL (ref 0–1)
MONOCYTES NFR BLD: 16 %
NEUTROPHILS # BLD AUTO: 3.7 K/UL (ref 1.8–7.7)
NEUTROPHILS NFR BLD: 61 %
OSMOLALITY UR CALC.SUM OF ELEC: 283 MOSM/KG (ref 275–295)
PLATELET # BLD AUTO: 145 K/UL (ref 140–400)
PMV BLD AUTO: 9.3 FL (ref 7.4–10.3)
POTASSIUM SERPL-SCNC: 4.4 MMOL/L (ref 3.3–5.1)
RBC # BLD AUTO: 4.28 M/UL (ref 4.5–5.9)
SODIUM SERPL-SCNC: 137 MMOL/L (ref 136–144)
WBC # BLD AUTO: 6.1 K/UL (ref 4–11)

## 2017-08-16 PROCEDURE — 99232 SBSQ HOSP IP/OBS MODERATE 35: CPT | Performed by: INTERNAL MEDICINE

## 2017-08-16 PROCEDURE — 99233 SBSQ HOSP IP/OBS HIGH 50: CPT | Performed by: HOSPITALIST

## 2017-08-16 RX ORDER — NEBIVOLOL 5 MG/1
2.5 TABLET ORAL
Status: DISCONTINUED | OUTPATIENT
Start: 2017-08-16 | End: 2017-08-17

## 2017-08-16 RX ORDER — NEBIVOLOL 5 MG/1
2.5 TABLET ORAL
Status: DISCONTINUED | OUTPATIENT
Start: 2017-08-16 | End: 2017-08-16

## 2017-08-16 NOTE — PAYOR COMM NOTE
--------------  ADMISSION REVIEW     Payor: Damir FLANAGAN PPO.EPO.BLUE [facility]  Subscriber #:  [id number]  Authorization Number: [id number]    Admit date: 8/14/17  Admit time: 1854       Admitting Physician: Toyin De Leon MD  Attending Physician:  Too Ken completed radiation 2016 for prostate cancer   • Extrinsic asthma, unspecified    • Gallbladder problem    • High blood pressure    • High cholesterol    • History of blood clots     PE 2014, post-op, Left arm blood clot 2009   • Hypertrophy of prostate REUMATOID ARTHRITIS   • Cancer Neg    • Stroke Neg       reports that he has never smoked. He has never used smokeless tobacco. He reports that he drinks alcohol. He reports that he does not use drugs.     Allergies:    Bee Venom               Anaphylaxis (SINGULAIR) 10 MG Oral Tab Take 10 mg by mouth nightly.  Disp:  Rfl:  8/12/2017 at 2100   Pantoprazole Sodium 40 MG Oral Tab EC Take 1 tablet (40 mg total) by mouth every morning before breakfast. Disp: 90 tablet Rfl: 3    ipratropium-albuterol (DUONEB) 0.5 04/30/2017   ALKPHO 80 04/30/2017   BILT 0.8 04/30/2017   TP 7.3 04/30/2017   AST 22 04/30/2017   ALT 27 04/30/2017   PTT 41.3 (H) 04/30/2017   INR 1.78 (H) 04/30/2017   PT 22.2 (H) 05/28/2014   TSH 3.310 04/27/2017   LAWRENCE 46 04/09/2012    04/09/2012 proceed.     Time spent on counseling/coordination of care:  15 Minutes     Total time spent with patient:  [address]  8/14/2017  11:44 AM[RH.1]    Electronically signed by Júnior Johnson MD on 8/14/2017 11:49 AM   Attribution Covington Kumar Silverio RN    8/15/2017 2018 Given 2 mg Intravenous Jack Cline RN    8/15/2017 1715 Given 2 mg Intravenous Kathrin Gallagher RN      morphINE sulfate (PF) 4 MG/ML injection 4 mg     Date Action Dose Route User    8/15/2017 6095 Given 4 mg Int changes. Stat echo has been ordered to evaluate for wall motion abnormality's. Patient remains on nitroglycerin drip and his pain medications. CCU. Impression:   Chest pain  Rule out myocardial infarction.   Follow-up with EKGs troponins and  STAT ec JOSE SOSA, DO         Assessment and Plan: s/p Bariatric surgery                                              Chest pain with neg troponins, stable EKG, Normal ECHO(normal wall motion and EF)                                              H/o nonobstructive Chloride 0.9 % solution         lactated ringers infusion   Intravenous Continuous   lactated ringers infusion   Intravenous Continuous   HYDROcodone-acetaminophen (NORCO) 5-325 MG per tab 1 tablet 1 tablet Oral PRN   Or         HYDROcodone-acetaminophen ( Fluticasone Furoate-Vilanterol (BREO ELLIPTA) 200-25 MCG/INH inhaler 1 puff 1 puff Inhalation Daily   Umeclidinium Bromide (INCRUSE ELLIPTA) 62.5 MCG/INH inhaler 1 puff 1 puff Inhalation Daily         Labs:     Lab Results  Component Value Date   WBC 8.6 HYDROcodone-acetaminophen (NORCO) 5-325 MG per tab 1 tablet 1 tablet Oral PRN   Or         HYDROcodone-acetaminophen (NORCO) 5-325 MG per tab 2 tablet 2 tablet Oral PRN   fentaNYL citrate (SUBLIMAZE) 0.05 MG/ML injection 25 mcg 25 mcg Intravenous Q5 Min Results  Component Value Date   WBC 6.1 08/16/2017   HGB 13.0 (L) 08/16/2017   HCT 38.5 (L) 08/16/2017    08/16/2017    08/16/2017   K 4.4 08/16/2017   CO2 28 08/16/2017   BUN 6 (L) 08/16/2017    (H) 08/16/2017         Imaging:

## 2017-08-16 NOTE — PROGRESS NOTES
Sona Davis / [place]  Kip Smith / Awa Santos / Korin Hernandezr / Jorge Mancini / Shayna Adams - [phone]  Answering Service [phone]      Progress Note    Gena Mercado Patient Status:  Inpatient    10/12/1955 MRN [id number] injection 0.6 mg 0.6 mg Intravenous Q5 Min PRN   morphINE sulfate (PF) 2 MG/ML injection 2 mg 2 mg Intravenous Q10 Min PRN   morphINE sulfate (PF) 4 MG/ML injection 4 mg 4 mg Intravenous Q10 Min PRN   morphINE sulfate (PF) 4 MG/ML injection 6 mg 6 mg Intra 12:36 PM

## 2017-08-16 NOTE — BH NUTRITION NOTE
Bariatric Inpatient Nutrition Note    Reviewed the postoperative gastric bypass diet progression.       Current Diet Order: stage I bariatric clear   IVF: LR 120mL/hr, D5W + 0.45 NS at 200mL/hr   Protein intake: minimal, ~5g from jello  Fluid intake: ~2-4 o

## 2017-08-16 NOTE — PROGRESS NOTES
[facility]    Progress Note      Assessment and Plan:   1. Postoperative chest pain–likely related to surgery. No evidence of ischemia with 2 negative troponins and an EKG that does not reflect ischemia.   He notes that his pain is much be Chest x-ray–mild basilar atelectasis    Nell Arthur MD  Medical Director, Critical Care, [address] [place] [address]  Medical Director, [facility]  Pager: [phone]

## 2017-08-16 NOTE — PROGRESS NOTES
[place] [facility] [place] [facility]    Progress Note    Suzann Cranker Patient Status:  Inpatient    10/12/1955 MRN [id number]   Location [facility] 4W/SW/SE Attending Yelena Isaac, [address] [facility] Day # 2 PCP THEO SOSA, DO       Subjective:   Ree mg 0.6 mg Intravenous Q5 Min PRN   morphINE sulfate (PF) 2 MG/ML injection 2 mg 2 mg Intravenous Q10 Min PRN   morphINE sulfate (PF) 4 MG/ML injection 4 mg 4 mg Intravenous Q10 Min PRN   morphINE sulfate (PF) 4 MG/ML injection 6 mg 6 mg Intravenous Q10 Min 04/06/2015   ESRML 6 11/15/2016   CRP <0.29 05/28/2014   BNP 14 05/28/2014   MG 1.9 08/14/2017   PHOS 2.6 04/27/2017   TROP 0.00 08/15/2017    08/14/2017   B12 485 04/27/2017       Xr Chest Ap Portable  (cpt=71010)    Result Date: 8/15/2017  CONCLUSI MD      Results:     CBC:    Lab Results  Component Value Date   WBC 6.1 08/16/2017   WBC 8.6 08/15/2017   WBC 12.6 (H) 08/14/2017     Lab Results  Component Value Date   HGB 13.0 (L) 08/16/2017   HGB 12.6 (L) 08/15/2017   HGB 14.2 08/14/2017      Lab Resu

## 2017-08-16 NOTE — PROGRESS NOTES
[facility] Cardiology Progress Note      Danilo Jesusita Patient Status:  Inpatient    10/12/1955 MRN [id number]   Location [facility] 2W/SW Attending Vani Randolph MD   Hosp Day # 2 PCP MIKEY Velasco lesions    Scheduled Meds:   • Heparin Sodium (Porcine)  5,000 Units Subcutaneous Q8H [facility]   • Mexiletine HCl  150 mg Oral Q8H [facility]   • nitroGLYCERIN  1 patch Transdermal Daily   • famoTIDine  20 mg Oral BID    Or   • famoTIDine  20 mg Intravenous BID   • Fl Ekg 12-lead    Result Date: 8/15/2017  ECG Report  Interpretation  -------------------------- Sinus Rhythm Low voltage in limb leads ABNORMAL When compared with ECG of 08/15/2017 04:56:38 no significant changes have occurred.  Electronically signed on

## 2017-08-17 VITALS
RESPIRATION RATE: 18 BRPM | SYSTOLIC BLOOD PRESSURE: 146 MMHG | HEART RATE: 77 BPM | HEIGHT: 71 IN | OXYGEN SATURATION: 94 % | TEMPERATURE: 97 F | BODY MASS INDEX: 39.94 KG/M2 | DIASTOLIC BLOOD PRESSURE: 70 MMHG | WEIGHT: 285.31 LBS

## 2017-08-17 LAB
ANION GAP SERPL CALC-SCNC: 2 MMOL/L (ref 0–18)
BASOPHILS # BLD: 0 K/UL (ref 0–0.2)
BASOPHILS NFR BLD: 0 %
BUN SERPL-MCNC: 4 MG/DL (ref 8–20)
BUN/CREAT SERPL: 6.2 (ref 10–20)
CALCIUM SERPL-MCNC: 8.2 MG/DL (ref 8.5–10.5)
CHLORIDE SERPL-SCNC: 108 MMOL/L (ref 95–110)
CO2 SERPL-SCNC: 27 MMOL/L (ref 22–32)
CREAT SERPL-MCNC: 0.65 MG/DL (ref 0.5–1.5)
EOSINOPHIL # BLD: 0.3 K/UL (ref 0–0.7)
EOSINOPHIL NFR BLD: 7 %
ERYTHROCYTE [DISTWIDTH] IN BLOOD BY AUTOMATED COUNT: 14.2 % (ref 11–15)
GLUCOSE SERPL-MCNC: 130 MG/DL (ref 70–99)
HCT VFR BLD AUTO: 35.6 % (ref 41–52)
HGB BLD-MCNC: 12.2 G/DL (ref 13.5–17.5)
LYMPHOCYTES # BLD: 1.3 K/UL (ref 1–4)
LYMPHOCYTES NFR BLD: 26 %
MCH RBC QN AUTO: 30.7 PG (ref 27–32)
MCHC RBC AUTO-ENTMCNC: 34.3 G/DL (ref 32–37)
MCV RBC AUTO: 89.4 FL (ref 80–100)
MONOCYTES # BLD: 0.8 K/UL (ref 0–1)
MONOCYTES NFR BLD: 16 %
NEUTROPHILS # BLD AUTO: 2.5 K/UL (ref 1.8–7.7)
NEUTROPHILS NFR BLD: 51 %
OSMOLALITY UR CALC.SUM OF ELEC: 283 MOSM/KG (ref 275–295)
PLATELET # BLD AUTO: 149 K/UL (ref 140–400)
PMV BLD AUTO: 9.4 FL (ref 7.4–10.3)
POTASSIUM SERPL-SCNC: 3.5 MMOL/L (ref 3.3–5.1)
RBC # BLD AUTO: 3.98 M/UL (ref 4.5–5.9)
SODIUM SERPL-SCNC: 137 MMOL/L (ref 136–144)
WBC # BLD AUTO: 5 K/UL (ref 4–11)

## 2017-08-17 PROCEDURE — 99239 HOSP IP/OBS DSCHRG MGMT >30: CPT | Performed by: HOSPITALIST

## 2017-08-17 PROCEDURE — 99232 SBSQ HOSP IP/OBS MODERATE 35: CPT | Performed by: INTERNAL MEDICINE

## 2017-08-17 RX ORDER — MEXILETINE HYDROCHLORIDE 150 MG/1
150 CAPSULE ORAL EVERY 12 HOURS SCHEDULED
Status: DISCONTINUED | OUTPATIENT
Start: 2017-08-17 | End: 2017-08-17

## 2017-08-17 RX ORDER — POTASSIUM CHLORIDE 1.5 G/1.77G
40 POWDER, FOR SOLUTION ORAL ONCE
Status: COMPLETED | OUTPATIENT
Start: 2017-08-17 | End: 2017-08-17

## 2017-08-17 RX ORDER — MEXILETINE HYDROCHLORIDE 150 MG/1
150 CAPSULE ORAL EVERY 12 HOURS SCHEDULED
Qty: 1 CAPSULE | Refills: 1 | Status: ON HOLD | OUTPATIENT
Start: 2017-08-17 | End: 2018-07-04

## 2017-08-17 NOTE — PROGRESS NOTES
[facility]  Cardiology Progress Note    Carry Laith Patient Status:  Inpatient    10/12/1955 MRN [id number]   Location [facility] 4W/SW/SE Attending Petros Amado DO   Hosp Day # 3 PCP THEO SOSA DO       Assessme obstruction and other lower urinary tract symptoms (LUTS)     Bilateral hip pain     Bursitis of both hips     Neoplasm of uncertain behavior of stomach, intestines, and rectum     Gastroesophageal reflux disease without esophagitis     Prostate cancer (HC --    GLU  137*  174*  122*   --   130*       No results for input(s): ALT, AST, ALB, AMYLASE, LIPASE, LDH in the last 72 hours.     Invalid input(s): ALPHOS, TBIL, DBIL, TPROT    Recent Labs   Lab  08/14/17   1543  08/14/17   1709  08/15/17   0702   TROP 7.5-325 MG/15ML solution 20 mL 20 mL Oral Q4H PRN   morphINE sulfate (PF) 2 MG/ML injection 1 mg 1 mg Intravenous Q2H PRN   Or      morphINE sulfate (PF) 2 MG/ML injection 2 mg 2 mg Intravenous Q2H PRN   Or      morphINE sulfate (PF) 4 MG/ML injection 4 mg

## 2017-08-18 ENCOUNTER — PATIENT OUTREACH (OUTPATIENT)
Dept: CASE MANAGEMENT | Age: 62
End: 2017-08-18

## 2017-08-18 DIAGNOSIS — Z02.9 ENCOUNTERS FOR ADMINISTRATIVE PURPOSE: ICD-10-CM

## 2017-08-18 NOTE — PROGRESS NOTES
[facility]    Progress Note      Assessment and Plan:   1. Postoperative chest pain– resolved.     Recommendations:  We will sign off and okay to discharge home from my perspective.     2.  Status post Sonya-en-Y gastric bypass     Recommend [phone]

## 2017-08-18 NOTE — DISCHARGE SUMMARY
[place] [facility]    Discharge Summary    Lily Miramontes Patient Status:  Inpatient    10/12/1955 MRN [id number]   Location [facility] 4W/SW/SE Attending No att. providers found   Hosp Day # 3 PCP THEO SOSA, DO     Date of Ad LEMUEL on CPAP     Fatigue     GERD (gastroesophageal reflux disease)     Morbid obesity with BMI of 40.0-44.9, adult ([facility] [address] [facility]     History of colon polyps     Diverticulosis of large intestine without hemorrhage     Morbidly obese (HCC)     Obesity     Dilated c status stable. Chest pain likely secondary to surgical procedure. No recurrent symptoms. Normal EF, normal wall motion on echo. Stable EKG's, neg troponins. Recommend f/u stress test. Pt does not want test this admission.  Pt understands recommendations times daily. Refills:  0     omeprazole 20 MG Cpdr  Commonly known as:  PRILOSEC      Take 40 mg by mouth 2 (two) times daily before meals. Refills:  0     Pravastatin Sodium 20 MG Tabs  Commonly known as:  PRAVACHOL      Take 20 mg by mouth nightly.

## 2017-08-18 NOTE — PROGRESS NOTES
Initial Post Discharge Follow Up   Discharge Date: 8/17/17  Contact Date: 8/18/2017    Consent Verification:  Assessment Completed With: Patient  HIPAA Verified?   Yes    Discharge Dx:  Morbid obesity, s/p Sonya-en-Y gastric bypass on 8/14/17 with Dr. Radhames Mack Take 1 capsule (150 mg total) by mouth every 12 (twelve) hours. Disp: 1 capsule Rfl: 1   omeprazole 20 MG Oral Capsule Delayed Release Take 40 mg by mouth 2 (two) times daily before meals.  Disp:  Rfl:    Albuterol Sulfate HFA (PROAIR HFA) 108 (90 Base) MCG Yes  • May I go over your medications with you to make sure we are not missing anything? yes  • Are there any reasons that keep you from taking your medication as prescribed? No  Are you having any concerns with constipation?  Yes, pt states he has not had a [address] [place] [address] )    Oct 23, 2017  9:45 AM CDT [facility] RAD ONC MD LEVEL 2 FOLLOW UP with MD [place] [id number] ([place] at [address] [place] [address]. )    Apr 20, 2018 10:00 AM CDT Established Patient with Meghan Watt MD [address] [place] [address]

## 2017-08-21 ENCOUNTER — PRIOR ORIGINAL RECORDS (OUTPATIENT)
Dept: OTHER | Age: 62
End: 2017-08-21

## 2017-08-21 ENCOUNTER — TELEPHONE (OUTPATIENT)
Dept: SURGERY | Facility: CLINIC | Age: 62
End: 2017-08-21

## 2017-08-21 NOTE — TELEPHONE ENCOUNTER
Spoke with pt; doing well post-op. Walking okay. Denies any major problems, no significant pain and not needing pain meds. Denies chest pain, HR ~58, denies any sob or fever. Good protein and fluid intake (~40 oz).  Walking as much as possible and frequentl

## 2017-08-22 ENCOUNTER — OFFICE VISIT (OUTPATIENT)
Dept: SURGERY | Facility: CLINIC | Age: 62
End: 2017-08-22

## 2017-08-22 VITALS
WEIGHT: 270.88 LBS | SYSTOLIC BLOOD PRESSURE: 123 MMHG | HEIGHT: 71 IN | BODY MASS INDEX: 37.92 KG/M2 | DIASTOLIC BLOOD PRESSURE: 52 MMHG | HEART RATE: 42 BPM | RESPIRATION RATE: 16 BRPM

## 2017-08-22 NOTE — PROGRESS NOTES
[address], [address] [address]  Dept: [phone]    8/22/2017    BARIATRIC EXISTING PATIENT/FOLLOW UP    HPI:  Solomon Yoder is a 57year old-year old male who pr

## 2017-08-24 ENCOUNTER — OFFICE VISIT (OUTPATIENT)
Dept: FAMILY MEDICINE CLINIC | Facility: CLINIC | Age: 62
End: 2017-08-24

## 2017-08-24 ENCOUNTER — TELEPHONE (OUTPATIENT)
Dept: FAMILY MEDICINE CLINIC | Facility: CLINIC | Age: 62
End: 2017-08-24

## 2017-08-24 VITALS
HEIGHT: 71 IN | HEART RATE: 94 BPM | DIASTOLIC BLOOD PRESSURE: 78 MMHG | BODY MASS INDEX: 37.52 KG/M2 | RESPIRATION RATE: 18 BRPM | TEMPERATURE: 97 F | WEIGHT: 268 LBS | SYSTOLIC BLOOD PRESSURE: 130 MMHG

## 2017-08-24 DIAGNOSIS — G47.33 OSA ON CPAP: ICD-10-CM

## 2017-08-24 DIAGNOSIS — I25.10 CORONARY ARTERY DISEASE INVOLVING NATIVE HEART WITHOUT ANGINA PECTORIS, UNSPECIFIED VESSEL OR LESION TYPE: ICD-10-CM

## 2017-08-24 DIAGNOSIS — I42.0 DILATED CARDIOMYOPATHY (HCC): ICD-10-CM

## 2017-08-24 DIAGNOSIS — Z98.84 STATUS POST GASTRIC BYPASS FOR OBESITY: ICD-10-CM

## 2017-08-24 DIAGNOSIS — R06.02 SHORTNESS OF BREATH: Primary | ICD-10-CM

## 2017-08-24 DIAGNOSIS — R93.89 ABNORMAL X-RAY: ICD-10-CM

## 2017-08-24 DIAGNOSIS — E66.01 MORBIDLY OBESE (HCC): Primary | ICD-10-CM

## 2017-08-24 DIAGNOSIS — Z99.89 OSA ON CPAP: ICD-10-CM

## 2017-08-24 PROCEDURE — 99495 TRANSJ CARE MGMT MOD F2F 14D: CPT | Performed by: FAMILY MEDICINE

## 2017-08-24 RX ORDER — PANTOPRAZOLE SODIUM 20 MG/1
20 TABLET, DELAYED RELEASE ORAL
COMMUNITY
End: 2017-11-22 | Stop reason: ALTCHOICE

## 2017-08-24 NOTE — PATIENT INSTRUCTIONS
After Bariatric Surgery: The First 6 Weeks  After surgery for weight loss, your body needs time to adjust. Once you're ready, you will be given programs to follow for your nutrition and physical activity. Follow these programs as directed.  The success of · A fever of 100.4°F (38°C) or higher, or as directed by your healthcare provider  · A red, bleeding, or draining incision  · Frequent or persistent nausea or vomiting  · Increased pain at an incision  · Pain or swelling in your legs  · Trouble breathing o © 3458-3799 [address], [address] [place]. All rights reserved. This information is not intended as a substitute for professional medical care. Always follow your healthcare professional's instructions.

## 2017-08-24 NOTE — TELEPHONE ENCOUNTER
Per you,Dr Barton I have placed a call to Kary [place] [id number] to [facility]. I have ask her to have a radiologist  do a comparison of pt most recent Chest films done at [facility], to those done at [facility] and give us an addendum as to findings and r

## 2017-08-24 NOTE — PROGRESS NOTES
HPI:    Alina Nair is a 57year old male here today for hospital follow up.    He was discharged from Inpatient hospital, [facility]  to Home   Admission Date: 8/14/17   Discharge Date: 8/17/17  Hospital Discharge Diagnosis: rectal bleed  TCM constipation but denies any diarrhea. He had an appointment this week with bariatric surgery for postop follow-up and is doing well. Allergies:  He is allergic to bee venom.     Current Meds:    Current Outpatient Prescriptions on File Prior to Visit: unspecified; Gallbladder problem; High blood pressure; High cholesterol; History of blood clots; Hypertrophy of prostate with urinary obstruction and other lower urinary tract symptoms (LUTS) (9/8/2015);  Morbid obesity with BMI of 40.0-44.9, adult ([facility] N denies thyroid history  ALL/ASTHMA: denies hx of allergy or asthma    PHYSICAL EXAM:   No LMP for male patient. Estimated body mass index is 37.38 kg/m² as calculated from the following:    Height as of this encounter: 71\".     Weight as of this encounter [facility] to see if it can be compared to previous chest x-ray results. If not, we will proceed with CT of the chest with contrast.**          No orders of the defined types were placed in this encounter.       Meds & Refills for this Visit:    No pr

## 2017-08-30 ENCOUNTER — HOSPITAL ENCOUNTER (OUTPATIENT)
Dept: CV DIAGNOSTICS | Facility: HOSPITAL | Age: 62
Discharge: HOME | End: 2017-08-30
Attending: INTERNAL MEDICINE
Payer: COMMERCIAL

## 2017-08-30 DIAGNOSIS — I25.10 CVD (CARDIOVASCULAR DISEASE): ICD-10-CM

## 2017-08-30 DIAGNOSIS — I10 ESSENTIAL HYPERTENSION: ICD-10-CM

## 2017-08-30 PROCEDURE — 93018 CV STRESS TEST I&R ONLY: CPT | Performed by: INTERNAL MEDICINE

## 2017-08-30 PROCEDURE — 78452 HT MUSCLE IMAGE SPECT MULT: CPT | Performed by: INTERNAL MEDICINE

## 2017-08-30 PROCEDURE — 93017 CV STRESS TEST TRACING ONLY: CPT | Performed by: INTERNAL MEDICINE

## 2017-08-31 ENCOUNTER — TELEPHONE (OUTPATIENT)
Dept: FAMILY MEDICINE CLINIC | Facility: CLINIC | Age: 62
End: 2017-08-31

## 2017-08-31 ENCOUNTER — HOSPITAL ENCOUNTER (OUTPATIENT)
Dept: CT IMAGING | Facility: HOSPITAL | Age: 62
Discharge: HOME | End: 2017-08-31
Attending: FAMILY MEDICINE
Payer: COMMERCIAL

## 2017-08-31 ENCOUNTER — HOSPITAL ENCOUNTER (OUTPATIENT)
Dept: CT IMAGING | Facility: HOSPITAL | Age: 62
Discharge: HOME | End: 2017-08-31
Attending: OTOLARYNGOLOGY
Payer: COMMERCIAL

## 2017-08-31 DIAGNOSIS — R93.89 ABNORMAL X-RAY: ICD-10-CM

## 2017-08-31 DIAGNOSIS — R06.02 SHORTNESS OF BREATH: ICD-10-CM

## 2017-08-31 DIAGNOSIS — H93.11 TINNITUS, RIGHT: ICD-10-CM

## 2017-08-31 DIAGNOSIS — H91.91 UNILATERAL HEARING LOSS, RIGHT: ICD-10-CM

## 2017-08-31 DIAGNOSIS — R93.89 ABNORMAL CT OF THE CHEST: Primary | ICD-10-CM

## 2017-08-31 DIAGNOSIS — G47.33 OSA (OBSTRUCTIVE SLEEP APNEA): ICD-10-CM

## 2017-08-31 PROCEDURE — 70482 CT ORBIT/EAR/FOSSA W/O&W/DYE: CPT | Performed by: OTOLARYNGOLOGY

## 2017-08-31 PROCEDURE — 71260 CT THORAX DX C+: CPT | Performed by: FAMILY MEDICINE

## 2017-08-31 NOTE — TELEPHONE ENCOUNTER
Message received from [address] [place] [address] Street today:    Good afternoon,       Please advise if this ref is for a future test, a similar ref was placed on 8/25/17 & it seems like pt had test done today.        Thank you,    Lona Askew

## 2017-09-01 ENCOUNTER — TELEPHONE (OUTPATIENT)
Dept: FAMILY MEDICINE CLINIC | Facility: CLINIC | Age: 62
End: 2017-09-01

## 2017-09-01 NOTE — TELEPHONE ENCOUNTER
Pt states he had a CT scan of his chest done yesterday and they determined they wanted to have the pt do a CT of the abdomen due to an abnormality.  On Aug 14th pt stated he had gastric bypass surgery and pt is wondering if the abnormality that was found on

## 2017-09-01 NOTE — TELEPHONE ENCOUNTER
They did mention about post op changes in the stomach. The abnormality is not due to the gastric bypass. Advise patient to follow recommendation. Thank you.

## 2017-09-05 ENCOUNTER — PRIOR ORIGINAL RECORDS (OUTPATIENT)
Dept: OTHER | Age: 62
End: 2017-09-05

## 2017-09-08 ENCOUNTER — HOSPITAL ENCOUNTER (OUTPATIENT)
Dept: CT IMAGING | Facility: HOSPITAL | Age: 62
Discharge: HOME | End: 2017-09-08
Attending: FAMILY MEDICINE
Payer: COMMERCIAL

## 2017-09-08 DIAGNOSIS — R93.89 ABNORMAL CT OF THE CHEST: ICD-10-CM

## 2017-09-08 PROCEDURE — 74160 CT ABDOMEN W/CONTRAST: CPT | Performed by: FAMILY MEDICINE

## 2017-09-11 ENCOUNTER — OFFICE VISIT (OUTPATIENT)
Dept: SURGERY | Facility: CLINIC | Age: 62
End: 2017-09-11

## 2017-09-11 VITALS — BODY MASS INDEX: 36 KG/M2 | HEIGHT: 71 IN | WEIGHT: 257.13 LBS

## 2017-09-11 DIAGNOSIS — Z98.84 S/P BARIATRIC SURGERY: ICD-10-CM

## 2017-09-11 DIAGNOSIS — Z87.898 HISTORY OF MORBID OBESITY: ICD-10-CM

## 2017-09-11 DIAGNOSIS — E66.9 OBESITY (BMI 30-39.9): ICD-10-CM

## 2017-09-11 PROCEDURE — 97803 MED NUTRITION INDIV SUBSEQ: CPT | Performed by: DIETITIAN, REGISTERED

## 2017-09-11 NOTE — PROGRESS NOTES
[address] [place] [address] AND WEIGHT LOSS CLINIC  [address] [place] [address]  Dept: [phone]  Loc: [phone]    09/11/17      Bariatric Follow-up Nutrition Session    Lily Miramontes is a 57year old male.      Renate 08/04/2017   TCHDLRATIO 3.16 08/04/2017   [place] [address] 08/04/2017        Vitamins/Minerals:    Lab Results  Component Value Date   B12 485 04/27/2017       Lab Results  Component Value Date   VITD 40.3 04/27/2017       Lab Results  Component Value Date/Time MG Oral Tab, Take 5 mg by mouth every morning.  , Disp: , Rfl:   •  Montelukast Sodium (SINGULAIR) 10 MG Oral Tab, Take 10 mg by mouth nightly., Disp: , Rfl:     Height:  Ht Readings from Last 1 Encounters:  09/11/17 : 5' 11\" (1.803 m)    Weight:  Wt Read portions. Encouraged patient to continue f/u with bariatric team and attend support group. Nutrition Diagnosis     Nutrition Diagnosis: Obesity related to previous intake of excess calories as evidenced by BMI and diet history.       Intervention     Re

## 2017-09-18 ENCOUNTER — OFFICE VISIT (OUTPATIENT)
Dept: SURGERY | Facility: CLINIC | Age: 62
End: 2017-09-18

## 2017-09-18 VITALS
BODY MASS INDEX: 35.56 KG/M2 | HEIGHT: 71 IN | DIASTOLIC BLOOD PRESSURE: 74 MMHG | OXYGEN SATURATION: 95 % | RESPIRATION RATE: 16 BRPM | HEART RATE: 74 BPM | WEIGHT: 254 LBS | SYSTOLIC BLOOD PRESSURE: 117 MMHG

## 2017-09-18 DIAGNOSIS — Z99.89 OSA ON CPAP: ICD-10-CM

## 2017-09-18 DIAGNOSIS — K21.9 GASTROESOPHAGEAL REFLUX DISEASE WITHOUT ESOPHAGITIS: ICD-10-CM

## 2017-09-18 DIAGNOSIS — E66.01 MORBID OBESITY WITH BMI OF 40.0-44.9, ADULT (HCC): ICD-10-CM

## 2017-09-18 DIAGNOSIS — G47.33 OSA ON CPAP: ICD-10-CM

## 2017-09-18 DIAGNOSIS — I10 ESSENTIAL HYPERTENSION: Primary | ICD-10-CM

## 2017-09-18 DIAGNOSIS — E78.00 HYPERCHOLESTEROLEMIA: ICD-10-CM

## 2017-09-18 DIAGNOSIS — R06.02 SHORTNESS OF BREATH: ICD-10-CM

## 2017-09-18 PROCEDURE — 99214 OFFICE O/P EST MOD 30 MIN: CPT | Performed by: INTERNAL MEDICINE

## 2017-09-18 NOTE — PROGRESS NOTES
[address], [address] [address]  Dept: [phone]    Date: 2017    Patient:  Shona Alejo  :      10/12/1955  MRN:      [id number]    Referring Provider: Mometasone Furoate 220 MCG/INH Inhalation Aerosol Powder, Breath Activated, Inhale 2 puffs into the lungs 2 (two) times daily. , Disp: , Rfl:   •  Multiple Vitamins-Minerals (TAB-A-JESUS ALBERTO MAXIMUM) Oral Tab, Take 1 tablet by mouth daily. , Disp: , Rfl:   •  tri PROCEDURE UNLISTED      Comment: CERVICAL FUSION/HARDWARE  12/15/2014: VENTRAL HERNIA REPAIR N/A      Comment: Procedure:  HERNIA VENTRAL REPAIR;  Surgeon:                Lorena Fowler MD;  Location: [address] [place] [address] MAIN OR    Family History:    Family History   Probl Y 08/14/2017    Doing well      OBSTRUCTIVE SLEEP APNEA: The patient states his sleep apnea has been stable since the last clinic visit. There has not been any increase in hyper-somnolence. Goals for next month:  1. Keep a food log.   2. Drink 48-64 oun

## 2017-09-19 ENCOUNTER — PRIOR ORIGINAL RECORDS (OUTPATIENT)
Dept: OTHER | Age: 62
End: 2017-09-19

## 2017-09-26 ENCOUNTER — OFFICE VISIT (OUTPATIENT)
Dept: SURGERY | Facility: CLINIC | Age: 62
End: 2017-09-26

## 2017-09-26 VITALS
OXYGEN SATURATION: 96 % | RESPIRATION RATE: 16 BRPM | BODY MASS INDEX: 35.53 KG/M2 | WEIGHT: 253.81 LBS | HEIGHT: 71 IN | HEART RATE: 80 BPM | SYSTOLIC BLOOD PRESSURE: 121 MMHG | DIASTOLIC BLOOD PRESSURE: 76 MMHG

## 2017-09-26 RX ORDER — THEOPHYLLINE ANHYDROUS 300 MG/1
300 CAPSULE, EXTENDED RELEASE ORAL 2 TIMES DAILY
Refills: 3 | COMMUNITY
Start: 2017-08-01 | End: 2017-10-23

## 2017-09-26 NOTE — PROGRESS NOTES
[address] [place] [address]  [facility]  [address] [address]  Dept: [phone]    9/26/2017    BARIATRIC EXISTING PATIENT/FOLLOW UP    HPI:  Shannon Polanoc is a 57year old-year old male who pr

## 2017-09-26 NOTE — HISTORICAL OFFICE NOTE
Michael Roman  : 10/12/1955  ACCOUNT:  [id number]  [phone]  PCP: Dr. West Rosas DATE: 2017  DICTATED BY:  Adrianna Whalen M.D.]    CHIEF COMPLAINT: [chest discomfort, Followup of .  CAD, of native vessels, nonobstructive, Followup of INTEG: no new rashes, lesions. MS: no limiting arthritis. NEURO: no localized deficits. HEM/LYMPH: denies easy bruising. ALL: no new food or environmental allergies.     PAST HISTORY: asthma; LEMUEL on CPAP, PE after bicep surgery and rt knee arthroscopy; aug test within the next week. He will follow-up with Dr. Lamont Mccall as to when to resume his Xarelto that was left a bit open ended.  Since he is only a week out from surgery, and he had an atrial fibrillation burden of less than 2% I believe, I would not be oppos 0.5-2.5  12/22/14 Montelukast Sodium    10MG  08/18/14 Abdirashid-24               300MG     twice daily  08/06/14 Spiriva HandiHaler    18MCG     AS DIRECTED.  01/29/14 Dulera                200-5MCG  as dir  07/06/09 Singulair (Oral)                10mg by lula

## 2017-09-28 ENCOUNTER — HOSPITAL ENCOUNTER (OUTPATIENT)
Dept: INTERVENTIONAL RADIOLOGY/VASCULAR | Facility: HOSPITAL | Age: 62
Discharge: HOME | End: 2017-09-28
Attending: INTERNAL MEDICINE | Admitting: INTERNAL MEDICINE
Payer: COMMERCIAL

## 2017-09-28 VITALS
TEMPERATURE: 98 F | HEIGHT: 72 IN | RESPIRATION RATE: 15 BRPM | BODY MASS INDEX: 33.86 KG/M2 | OXYGEN SATURATION: 96 % | HEART RATE: 69 BPM | WEIGHT: 250 LBS | SYSTOLIC BLOOD PRESSURE: 116 MMHG | DIASTOLIC BLOOD PRESSURE: 63 MMHG

## 2017-09-28 DIAGNOSIS — I10 HTN (HYPERTENSION): ICD-10-CM

## 2017-09-28 DIAGNOSIS — I42.0 DILATED CARDIOMYOPATHY (HCC): ICD-10-CM

## 2017-09-28 DIAGNOSIS — I25.10 CAD (CORONARY ARTERY DISEASE): ICD-10-CM

## 2017-09-28 PROCEDURE — 0JPT0PZ REMOVAL OF CARDIAC RHYTHM RELATED DEVICE FROM TRUNK SUBCUTANEOUS TISSUE AND FASCIA, OPEN APPROACH: ICD-10-PCS | Performed by: INTERNAL MEDICINE

## 2017-09-28 PROCEDURE — 0JH608Z INSERTION OF DEFIBRILLATOR GENERATOR INTO CHEST SUBCUTANEOUS TISSUE AND FASCIA, OPEN APPROACH: ICD-10-PCS | Performed by: INTERNAL MEDICINE

## 2017-09-28 PROCEDURE — 99152 MOD SED SAME PHYS/QHP 5/>YRS: CPT

## 2017-09-28 PROCEDURE — 33263 RMVL & RPLCMT DFB GEN 2 LEAD: CPT

## 2017-09-28 PROCEDURE — 99153 MOD SED SAME PHYS/QHP EA: CPT

## 2017-09-28 RX ORDER — SODIUM CHLORIDE 9 MG/ML
INJECTION, SOLUTION INTRAVENOUS CONTINUOUS
Status: DISCONTINUED | OUTPATIENT
Start: 2017-09-28 | End: 2017-09-28

## 2017-09-28 RX ORDER — MONTELUKAST SODIUM 10 MG/1
10 TABLET ORAL NIGHTLY
COMMUNITY

## 2017-09-28 RX ORDER — MIDAZOLAM HYDROCHLORIDE 1 MG/ML
INJECTION INTRAMUSCULAR; INTRAVENOUS
Status: COMPLETED
Start: 2017-09-28 | End: 2017-09-28

## 2017-09-28 RX ORDER — BACITRACIN 50000 [USP'U]/1
INJECTION, POWDER, LYOPHILIZED, FOR SOLUTION INTRAMUSCULAR
Status: COMPLETED
Start: 2017-09-28 | End: 2017-09-28

## 2017-09-28 RX ORDER — LIDOCAINE HYDROCHLORIDE 10 MG/ML
INJECTION, SOLUTION INFILTRATION; PERINEURAL
Status: COMPLETED
Start: 2017-09-28 | End: 2017-09-28

## 2017-09-28 RX ADMIN — SODIUM CHLORIDE: 9 INJECTION, SOLUTION INTRAVENOUS at 09:28:00

## 2017-09-28 NOTE — H&P
[facility]  H&P    Doll Camera Patient Status:  Outpatient in a Bed    10/12/1955 MRN [id number]   [facility] Heide Calvert MD   Hosp Day # 0  [facility], Illness:  Kaitlyn Vallejo is a a(n) 57year old male. Presents for generator change.      History:  Past Medical History:   Diagnosis Date   • Arrhythmia    • Asthma    • Cellulitis and abscess of trunk    • COPD (chronic obstructive pulmonary disease) ([facility] [address] Karen Mays MD;  Location: [address] [place] [address] MAIN OR  Family History   Problem Relation Age of Onset   • Hypertension Father    • Heart Disease Father    • Other [OTHER] Father    • benign prostatic hypertrophy [OTHER] Father      x4   • CABG [OTHER] Father    • Heart Disorde 1.02 11/25/2015   INR 1.83 (H) 04/06/2015         Isaias Singleton MD  9/28/2017  11:38 AM    Iza Moura MD  [facility]  Cardiac Electrophysiolgy

## 2017-09-28 NOTE — PROCEDURES
OPERATION(S) PERFORMED:   1. Dual chamber ICD implant. 2. ICD generator removal.     : Monse Ochoa MD  INDICATION: Device at OXANA  COMPLICATIONS: None     ESTIMATED BLOOD LOSS: Minimal.  SEDATION: IV was maintained by RN.  Patient was assessed by my

## 2017-09-28 NOTE — PLAN OF CARE
Post generator change with Dr Soler Arrow. Site is soft with no hematoma noted. Dressing is clean, dry and intact. Patient is hemodynamically stable.   Patient and wife given discharge instructions, both of whom verbalize understanding of all instructions pro

## 2017-10-05 ENCOUNTER — PRIOR ORIGINAL RECORDS (OUTPATIENT)
Dept: OTHER | Age: 62
End: 2017-10-05

## 2017-11-06 ENCOUNTER — OFFICE VISIT (OUTPATIENT)
Dept: SURGERY | Facility: CLINIC | Age: 62
End: 2017-11-06

## 2017-11-06 VITALS — HEIGHT: 72 IN | BODY MASS INDEX: 32.19 KG/M2 | WEIGHT: 237.69 LBS

## 2017-11-06 DIAGNOSIS — E66.9 OBESITY (BMI 30-39.9): ICD-10-CM

## 2017-11-06 PROCEDURE — 97803 MED NUTRITION INDIV SUBSEQ: CPT | Performed by: DIETITIAN, REGISTERED

## 2017-11-06 NOTE — PROGRESS NOTES
[address] [place] [address] AND WEIGHT LOSS CLINIC  [address] [place] [address]  Dept: [phone]  Loc: [phone]    11/06/17      Bariatric Follow-up Nutrition Session    Robson Pierce is a 60year old male.      Renate 08/04/2017   TCHDLRATIO 3.16 08/04/2017   [place] [address] 08/04/2017        Vitamins/Minerals:    Lab Results  Component Value Date   B12 485 04/27/2017       Lab Results  Component Value Date   VITD 40.3 04/27/2017       Lab Results  Component Value Date/Time Loss: 37.6% EBWL  BMI: Body mass index is 32.24 kg/m². Protein Intake: unable to assess protein intake due to poor pt recall. Per pt ~60 gm/day  Fluid intake:  ~30 ounces/day    Diet history: Pt unable to report intake due to poor recall.         Emil Maier Vitamin/mineral supplementation, Reinforce goals, Calorie/protein intake and Other:  RTC in February for 6 month f/u or sooner as needed    Giuseppe Enriquez, MS, RD, LDN

## 2017-11-17 ENCOUNTER — PRIOR ORIGINAL RECORDS (OUTPATIENT)
Dept: OTHER | Age: 62
End: 2017-11-17

## 2017-11-17 ENCOUNTER — LAB ENCOUNTER (OUTPATIENT)
Dept: LAB | Facility: HOSPITAL | Age: 62
End: 2017-11-17
Attending: NURSE PRACTITIONER
Payer: COMMERCIAL

## 2017-11-17 DIAGNOSIS — E66.01 MORBID OBESITY DUE TO EXCESS CALORIES (HCC): ICD-10-CM

## 2017-11-17 PROCEDURE — 83540 ASSAY OF IRON: CPT

## 2017-11-17 PROCEDURE — 80053 COMPREHEN METABOLIC PANEL: CPT

## 2017-11-17 PROCEDURE — 84425 ASSAY OF VITAMIN B-1: CPT

## 2017-11-17 PROCEDURE — 83036 HEMOGLOBIN GLYCOSYLATED A1C: CPT

## 2017-11-17 PROCEDURE — 85025 COMPLETE CBC W/AUTO DIFF WBC: CPT

## 2017-11-17 PROCEDURE — 80061 LIPID PANEL: CPT

## 2017-11-17 PROCEDURE — 83550 IRON BINDING TEST: CPT

## 2017-11-17 PROCEDURE — 82607 VITAMIN B-12: CPT

## 2017-11-17 PROCEDURE — 36415 COLL VENOUS BLD VENIPUNCTURE: CPT

## 2017-11-17 PROCEDURE — 82306 VITAMIN D 25 HYDROXY: CPT

## 2017-11-17 PROCEDURE — 82746 ASSAY OF FOLIC ACID SERUM: CPT

## 2017-11-22 ENCOUNTER — OFFICE VISIT (OUTPATIENT)
Dept: SURGERY | Facility: CLINIC | Age: 62
End: 2017-11-22

## 2017-11-22 VITALS
BODY MASS INDEX: 31.36 KG/M2 | RESPIRATION RATE: 16 BRPM | SYSTOLIC BLOOD PRESSURE: 120 MMHG | DIASTOLIC BLOOD PRESSURE: 67 MMHG | OXYGEN SATURATION: 95 % | HEIGHT: 71 IN | HEART RATE: 71 BPM | WEIGHT: 224 LBS

## 2017-11-22 DIAGNOSIS — Z98.84 H/O GASTRIC BYPASS: ICD-10-CM

## 2017-11-22 DIAGNOSIS — Z99.89 OSA ON CPAP: ICD-10-CM

## 2017-11-22 DIAGNOSIS — G47.33 OSA ON CPAP: ICD-10-CM

## 2017-11-22 DIAGNOSIS — I10 ESSENTIAL HYPERTENSION: Primary | ICD-10-CM

## 2017-11-22 DIAGNOSIS — E66.9 OBESITY (BMI 30-39.9): ICD-10-CM

## 2017-11-22 PROBLEM — E66.01 MORBID OBESITY WITH BMI OF 40.0-44.9, ADULT (HCC): Status: RESOLVED | Noted: 2017-02-20 | Resolved: 2017-11-22

## 2017-11-22 PROBLEM — E66.01 MORBIDLY OBESE (HCC): Status: RESOLVED | Noted: 2017-06-20 | Resolved: 2017-11-22

## 2017-11-22 PROBLEM — R53.83 FATIGUE: Status: RESOLVED | Noted: 2017-02-20 | Resolved: 2017-11-22

## 2017-11-22 PROCEDURE — 99214 OFFICE O/P EST MOD 30 MIN: CPT | Performed by: INTERNAL MEDICINE

## 2017-11-22 NOTE — PROGRESS NOTES
[address], [address] [address]  Dept: [phone]    Date: 2017    Patient:  Lily Miramontes  :      10/12/1955  MRN:      [id number]    Referring Provider: Vitamins-Minerals (MULTIVITAMIN OR), Take by mouth. , Disp: , Rfl:   •  Tiotropium Bromide Monohydrate (SPIRIVA HANDIHALER) 18 MCG Inhalation Cap, Inhale 18 mcg into the lungs daily. , Disp: , Rfl:   •  Nebivolol HCl (BYSTOLIC) 5 MG Oral Tab, Take 5 mg by m • Stroke Neg        ROS:    Review of Systems   Constitutional: Negative. HENT: Negative. Respiratory: Negative. Cardiovascular: Negative. Gastrointestinal: Negative. Genitourinary: Negative. Musculoskeletal: Negative.     Skin: Negativ 11/17/2017 10:21 AM   ANGELIAHDGRETCHEN 2.35 11/17/2017 10:21 AM     GERD: The patient believes his reflux is somewhat better of at least no worse on current medication.  He was encouraged to avoid caffeine products, elevated head of bed and not eat for 1 hour bef

## 2017-12-04 ENCOUNTER — PRIOR ORIGINAL RECORDS (OUTPATIENT)
Dept: OTHER | Age: 62
End: 2017-12-04

## 2017-12-05 LAB
ALBUMIN: 3.5 G/DL
ALKALINE PHOSPHATATE(ALK PHOS): 90 IU/L
BILIRUBIN TOTAL: 1.1 MG/DL
BUN: 18 MG/DL
CALCIUM: 9 MG/DL
CHLORIDE: 107 MEQ/L
CHOLESTEROL, TOTAL: 127 MG/DL
CREATININE, SERUM: 0.74 MG/DL
GLUCOSE: 115 MG/DL
HDL CHOLESTEROL: 54 MG/DL
HEMATOCRIT: 44 %
HEMOGLOBIN A1C: 5.3 %
HEMOGLOBIN: 14.8 G/DL
IRON, TOTAL: 60 MCG/DL
LDL CHOLESTEROL: 64 MG/DL
PLATELETS: 204 K/UL
POTASSIUM, SERUM: 4.6 MEQ/L
PROTEIN, TOTAL: 7.4 G/DL
RED BLOOD COUNT: 4.79 X 10-6/U
SGOT (AST): 16 IU/L
SGPT (ALT): 25 IU/L
SODIUM: 141 MEQ/L
TRIGLYCERIDES: 43 MG/DL
VITAMIN D 25-OH: 54.6 NG/ML
WHITE BLOOD COUNT: 5.4 X 10-3/U

## 2017-12-19 ENCOUNTER — OFFICE VISIT (OUTPATIENT)
Dept: SURGERY | Facility: CLINIC | Age: 62
End: 2017-12-19

## 2017-12-19 VITALS
DIASTOLIC BLOOD PRESSURE: 66 MMHG | HEART RATE: 74 BPM | WEIGHT: 220.38 LBS | BODY MASS INDEX: 30.85 KG/M2 | HEIGHT: 71 IN | SYSTOLIC BLOOD PRESSURE: 107 MMHG | RESPIRATION RATE: 16 BRPM

## 2017-12-19 NOTE — PROGRESS NOTES
[address] [place] [address] [place], [place] [address]is[place]  [address] [address]  Dept: [phone]    12/19/2017    BARIATRIC EXISTING PATIENT/FOLLOW UP    HPI:  Yamile Ragsdale is a 60year old-year old male who p

## 2017-12-28 ENCOUNTER — OFFICE VISIT (OUTPATIENT)
Dept: FAMILY MEDICINE CLINIC | Facility: CLINIC | Age: 62
End: 2017-12-28

## 2017-12-28 ENCOUNTER — MYAURORA ACCOUNT LINK (OUTPATIENT)
Dept: OTHER | Age: 62
End: 2017-12-28

## 2017-12-28 VITALS
SYSTOLIC BLOOD PRESSURE: 110 MMHG | WEIGHT: 224 LBS | BODY MASS INDEX: 31.36 KG/M2 | TEMPERATURE: 98 F | DIASTOLIC BLOOD PRESSURE: 74 MMHG | RESPIRATION RATE: 16 BRPM | HEART RATE: 62 BPM | HEIGHT: 71 IN

## 2017-12-28 DIAGNOSIS — M25.511 CHRONIC RIGHT SHOULDER PAIN: Primary | ICD-10-CM

## 2017-12-28 DIAGNOSIS — G89.29 CHRONIC RIGHT SHOULDER PAIN: Primary | ICD-10-CM

## 2017-12-28 PROCEDURE — 99213 OFFICE O/P EST LOW 20 MIN: CPT | Performed by: FAMILY MEDICINE

## 2017-12-28 NOTE — PROGRESS NOTES
[facility] Family [facility] Office Note  Chief Complaint:   Patient presents with:  Pain: right shoulder pain      HPI:   This is a 60year old male coming in for right shoulder pain. Pain started many years ago but not improving.  Inciting event w [facility]  3/11: OTHER SURGICAL HISTORY      Comment: STEFANIA  7/07: OTHER SURGICAL HISTORY      Comment: FOOT SX  No date: OTHER SURGICAL HISTORY      Comment: cervical surgery  2014: SHOULDER ARTHROSCOPY Right      Comment: AT [facility] R SHOULDER BICEP TENDON REP daily. Disp:  Rfl:    Multiple Vitamins-Minerals (MULTIVITAMIN OR) Take by mouth. Disp:  Rfl:    Tiotropium Bromide Monohydrate (SPIRIVA HANDIHALER) 18 MCG Inhalation Cap Inhale 18 mcg into the lungs daily.  Disp:  Rfl:    Nebivolol HCl (BYSTOLIC) 5 MG Ora day  -  F/u in 3 weeks and will refer to ortho if symptoms persist  - OP REFERRAL TO [facility]  - XR SHOULDER, COMPLETE (MIN 2 VIEWS), RIGHT (CPT=73030);  Future      Meds & Refills for this Visit:  No prescriptions requested or ordere Obesity (BMI 30-39. 9)      [facility],   12/28/2017  5:04 PM

## 2017-12-29 ENCOUNTER — HOSPITAL ENCOUNTER (OUTPATIENT)
Dept: GENERAL RADIOLOGY | Facility: HOSPITAL | Age: 62
Discharge: HOME | End: 2017-12-29
Attending: FAMILY MEDICINE
Payer: COMMERCIAL

## 2017-12-29 DIAGNOSIS — M25.511 CHRONIC RIGHT SHOULDER PAIN: ICD-10-CM

## 2017-12-29 DIAGNOSIS — G89.29 CHRONIC RIGHT SHOULDER PAIN: ICD-10-CM

## 2017-12-29 PROCEDURE — 73030 X-RAY EXAM OF SHOULDER: CPT | Performed by: FAMILY MEDICINE

## 2018-01-23 ENCOUNTER — OFFICE VISIT (OUTPATIENT)
Dept: PHYSICAL THERAPY | Age: 63
End: 2018-01-23
Attending: FAMILY MEDICINE
Payer: COMMERCIAL

## 2018-01-23 DIAGNOSIS — M25.511 CHRONIC RIGHT SHOULDER PAIN: ICD-10-CM

## 2018-01-23 DIAGNOSIS — G89.29 CHRONIC RIGHT SHOULDER PAIN: ICD-10-CM

## 2018-01-23 PROCEDURE — 97162 PT EVAL MOD COMPLEX 30 MIN: CPT

## 2018-01-23 PROCEDURE — 97530 THERAPEUTIC ACTIVITIES: CPT

## 2018-01-23 NOTE — PROGRESS NOTES
UPPER EXTREMITY EVALUATION:   Referring Physician: Dr. Faustina Fan  Diagnosis: Bilateral shoulder pain, rotator cuff syndrome, AC joint arthropathy      Date of Service: 1/23/2018     PATIENT SUMMARY   Arturo Amador is a 60year old y/o male who presents to  to help with circulation in arm- PM/defibrilator already in chest. He does have some neck pain- cage in his neck because of pinched nerve in R shoulder years ago. He is trying to do a lot of crunches and thinks this might be triggering more neck pain.  He n drop attack, dysphagia, dysarthria, diplopia, bwl/bladder, saddle anesthesia  Special Qs:R handed no symptoms with cough or sneeze    Physician Appointment:After PT   Imaging: radiograph- R shoulder: 1. Acromioclavicular joint arthropathy.       ASSESSMENT deg (at 90 deg abduction)   IR: R 55 deg; L 55 deg (at 90 deg abduction) Flexion: R WFl; L WFL  Extension: R WFl; L WFL  Supination: R WFl, L Wfl  Pronation: R WFl, L WFL   *some intermittent guarding which   Accessory motion: GH joint: R>L posterior and i function with ADLs including showering and dressing (8 visits)  · Pt will be independent and compliant with comprehensive HEP to maintain progress achieved in PT (ongoing)    Frequency / Duration: Patient will be seen for 102 x/week or a total of 8 visits

## 2018-01-25 ENCOUNTER — OFFICE VISIT (OUTPATIENT)
Dept: PHYSICAL THERAPY | Age: 63
End: 2018-01-25
Attending: FAMILY MEDICINE
Payer: COMMERCIAL

## 2018-01-25 PROCEDURE — 97110 THERAPEUTIC EXERCISES: CPT

## 2018-01-25 PROCEDURE — 97140 MANUAL THERAPY 1/> REGIONS: CPT

## 2018-01-25 PROCEDURE — 97112 NEUROMUSCULAR REEDUCATION: CPT

## 2018-01-25 NOTE — PROGRESS NOTES
Dx: Bilateral shoulder pain, rotator cuff syndrome, AC joint arthropathy           Authorized # of Visits:  PPO         Next MD visit: After PT  Fall Risk: standard         Precautions: Cancer, prostate- in remission and followed by oncologist         Subj workout     Charges: there ex: 1; manual: 1; neuro fifi: 1       Total Timed Treatment: 40 min  Total Treatment Time: 45 min

## 2018-01-30 ENCOUNTER — OFFICE VISIT (OUTPATIENT)
Dept: PHYSICAL THERAPY | Age: 63
End: 2018-01-30
Attending: FAMILY MEDICINE
Payer: COMMERCIAL

## 2018-01-30 PROCEDURE — 97110 THERAPEUTIC EXERCISES: CPT

## 2018-01-30 PROCEDURE — 97112 NEUROMUSCULAR REEDUCATION: CPT

## 2018-01-30 NOTE — PROGRESS NOTES
Dx: Bilateral shoulder pain, rotator cuff syndrome, AC joint arthropathy           Authorized # of Visits:  PPO         Next MD visit: After PT  Fall Risk: standard         Precautions: Cancer, prostate- in remission and followed by oncologist         Subj Open book 20 reps R/L Sidelying ER 20 reps x 2 sets 2nd set with 1 lbs        STM to biceps tendon 5 min R/L Sidelying ER 20 reps x 2 sets 2nd set with 1 lbs        Posterior glides gr III R/L 3 min each  Prone PAIVM skin lock 5 min gr III-IV no pain T s

## 2018-02-01 ENCOUNTER — OFFICE VISIT (OUTPATIENT)
Dept: PHYSICAL THERAPY | Age: 63
End: 2018-02-01
Attending: FAMILY MEDICINE
Payer: COMMERCIAL

## 2018-02-01 PROCEDURE — 97140 MANUAL THERAPY 1/> REGIONS: CPT

## 2018-02-01 PROCEDURE — 97530 THERAPEUTIC ACTIVITIES: CPT

## 2018-02-01 PROCEDURE — 97110 THERAPEUTIC EXERCISES: CPT

## 2018-02-01 NOTE — PROGRESS NOTES
Dx: Bilateral shoulder pain, rotator cuff syndrome, AC joint arthropathy           Authorized # of Visits:  PPO         Next MD visit: After PT  Fall Risk: standard         Precautions: Cancer, prostate- in remission and followed by oncologist         Subj flexion X pain free range - above and below painful range X 20 reps above and below pain       Wall circles 20 CW 20 CCW x 2 sets R/L X 20 CW 20 CCW R/L *fatigue Supine AAROM chest press attempted- pain with elbow flexion against gravity - deferred       P

## 2018-02-05 ENCOUNTER — OFFICE VISIT (OUTPATIENT)
Dept: SURGERY | Facility: CLINIC | Age: 63
End: 2018-02-05

## 2018-02-05 VITALS — BODY MASS INDEX: 29.85 KG/M2 | WEIGHT: 213.19 LBS | HEIGHT: 71 IN

## 2018-02-05 DIAGNOSIS — E66.3 OVERWEIGHT (BMI 25.0-29.9): ICD-10-CM

## 2018-02-05 DIAGNOSIS — Z98.84 S/P BARIATRIC SURGERY: ICD-10-CM

## 2018-02-05 PROCEDURE — 97803 MED NUTRITION INDIV SUBSEQ: CPT | Performed by: DIETITIAN, REGISTERED

## 2018-02-05 NOTE — PROGRESS NOTES
[facility]  [address] [place] [address]  Dept: [phone]  Loc: [phone]    02/05/18      Bariatric Follow-up Nutrition Session    Angelique Rodrigez is a 60year old male.      Renate 11/17/2017   Juwan 73 11/17/2017        Vitamins/Minerals:    Lab Results  Component Value Date   B12 490 11/17/2017       Lab Results  Component Value Date   VITD 54.6 11/17/2017       Lab Results  Component Value Date/Time   THIAMINE 139 11/17/2017 10: on intake of protein shake  Fluid intake:  50-68 ounces/day    Diet history:       Breakfast      AM Snack       Lunch     Snack     Dinner   1/2 scone  Flat white latte   Or   protein smoothie 1/2 scone 1/4 turkey sandwich [facility] greek yogurt    PM snack goals, Calorie/protein intake and Other:  RTC in August for f/u or sooner if needed      Marzena Mcmahon, MS, RD, LDN  Face-to-face time spent with pt: 30 minutes

## 2018-02-06 ENCOUNTER — OFFICE VISIT (OUTPATIENT)
Dept: PHYSICAL THERAPY | Age: 63
End: 2018-02-06
Attending: FAMILY MEDICINE
Payer: COMMERCIAL

## 2018-02-06 PROCEDURE — 97530 THERAPEUTIC ACTIVITIES: CPT

## 2018-02-06 PROCEDURE — 97112 NEUROMUSCULAR REEDUCATION: CPT

## 2018-02-06 PROCEDURE — 97110 THERAPEUTIC EXERCISES: CPT

## 2018-02-06 NOTE — PROGRESS NOTES
Dx: Bilateral shoulder pain, rotator cuff syndrome, AC joint arthropathy           Authorized # of Visits:  PPO         Next MD visit: After PT  Fall Risk: standard         Precautions: Cancer, prostate- in remission and followed by oncologist         Subj 2/6/2018   Tx#: 5/8 Date: Tx#: 6/ Date: Tx#: 7/ Date:    Tx#: 8/   Pulley flexion 20 reps; abduction 20 reps X 20 reps *pain deferred X 20 reps - less pain than active movements UBE level 3 5 min       Wall slides 20 reps flexion X pain free range - abo book, sidelying ER, wall circles- will ice after workout   1/30/2018 open book, sidelying ER, wall circles, wall slides pain free ranges above and below arc, prone row  2/1/2018 biceps stretch   2/6/2018 scaption, isometric ER, biceps stretch, wall circles

## 2018-02-08 ENCOUNTER — OFFICE VISIT (OUTPATIENT)
Dept: PHYSICAL THERAPY | Age: 63
End: 2018-02-08
Attending: FAMILY MEDICINE
Payer: COMMERCIAL

## 2018-02-08 PROCEDURE — 97112 NEUROMUSCULAR REEDUCATION: CPT

## 2018-02-08 PROCEDURE — 97110 THERAPEUTIC EXERCISES: CPT

## 2018-02-08 NOTE — PROGRESS NOTES
Dx: Bilateral shoulder pain, rotator cuff syndrome, AC joint arthropathy           Authorized # of Visits:  PPO         Next MD visit: After PT  Fall Risk: standard         Precautions: Cancer, prostate- in remission and followed by oncologist         Subj setting Scaption 1 lbs 10 reps x 2 sets     Wall circles 20 CW 20 CCW x 2 sets R/L X 20 CW 20 CCW R/L *fatigue Supine AAROM chest press attempted- pain with elbow flexion against gravity - deferred Wall circles 20 CW 20 CCW R/L X 20 CW 20 CCW R/L     Pulle after workout   1/30/2018 open book, sidelying ER, wall circles, wall slides pain free ranges above and below arc, prone row  2/1/2018 biceps stretch   2/6/2018 scaption, isometric ER, biceps stretch, wall circles, eccentric bicep curls  2/8/2018: seated t

## 2018-02-19 ENCOUNTER — OFFICE VISIT (OUTPATIENT)
Dept: SURGERY | Facility: CLINIC | Age: 63
End: 2018-02-19

## 2018-02-19 VITALS
WEIGHT: 209.5 LBS | SYSTOLIC BLOOD PRESSURE: 117 MMHG | BODY MASS INDEX: 29.33 KG/M2 | HEIGHT: 71 IN | RESPIRATION RATE: 16 BRPM | HEART RATE: 66 BPM | DIASTOLIC BLOOD PRESSURE: 75 MMHG | OXYGEN SATURATION: 97 %

## 2018-02-19 DIAGNOSIS — G44.89 OTHER HEADACHE SYNDROME: ICD-10-CM

## 2018-02-19 DIAGNOSIS — Z99.89 OSA ON CPAP: ICD-10-CM

## 2018-02-19 DIAGNOSIS — E66.3 OVERWEIGHT (BMI 25.0-29.9): ICD-10-CM

## 2018-02-19 DIAGNOSIS — G47.33 OSA ON CPAP: ICD-10-CM

## 2018-02-19 DIAGNOSIS — E55.9 VITAMIN D DEFICIENCY: ICD-10-CM

## 2018-02-19 DIAGNOSIS — Z98.84 H/O GASTRIC BYPASS: ICD-10-CM

## 2018-02-19 DIAGNOSIS — I10 ESSENTIAL HYPERTENSION: Primary | ICD-10-CM

## 2018-02-19 PROCEDURE — 99214 OFFICE O/P EST MOD 30 MIN: CPT | Performed by: INTERNAL MEDICINE

## 2018-02-19 RX ORDER — PNV NO.95/FERROUS FUM/FOLIC AC 28MG-0.8MG
TABLET ORAL DAILY
COMMUNITY
End: 2018-08-23 | Stop reason: ALTCHOICE

## 2018-02-19 RX ORDER — CYANOCOBALAMIN 500 UG/1
SPRAY NASAL
COMMUNITY

## 2018-02-19 NOTE — PROGRESS NOTES
[address] [place] [address]  Dept: [phone]    Date: 2017    Patient:  Luly Ozuna  :      10/12/1955  MRN:      [id number]    Referring Provider: Inhalation Aero Soln, Inhale 2 puffs into the lungs every 4 (four) hours. , Disp: , Rfl:   •  Mometasone Furo-Formoterol Fum (DULERA) 200-5 MCG/ACT Inhalation Aerosol, Inhale 2 puffs into the lungs 2 (two) times daily. , Disp: , Rfl:   •  Multiple Vitamins-M hypertrophy [OTHER] Father      x4   • CABG [OTHER] Father    • Heart Disorder Mother    • Asthma Mother    • Hypertension Mother    • Heart Disease Mother    • Other Tomi Look Mother    • Other [OTHER] Sister      REUMATOID ARTHRITIS   • Cancer Neg    • Str DYSLIPIDEMIA: Stable on the above prescribed meal plan and medication. Liver function stable.       Lab Results  Component Value Date/Time   CHOLEST 127 11/17/2017 10:21 AM   LDL 64 11/17/2017 10:21 AM   HDL 54 11/17/2017 10:21 AM   TRIG 43 11/17/2017

## 2018-02-20 ENCOUNTER — TELEPHONE (OUTPATIENT)
Dept: FAMILY MEDICINE CLINIC | Facility: CLINIC | Age: 63
End: 2018-02-20

## 2018-02-20 NOTE — TELEPHONE ENCOUNTER
I spoke with patient states he has occasional Headaches that started 2 months after his bypass surgery 8/2017. Had appointment with Surgeon yesterday and advised to follow up with PCP.   No headache today, no nausea, no vomiting, no dizziness, or vision is

## 2018-02-21 ENCOUNTER — OFFICE VISIT (OUTPATIENT)
Dept: FAMILY MEDICINE CLINIC | Facility: CLINIC | Age: 63
End: 2018-02-21

## 2018-02-21 VITALS
HEART RATE: 76 BPM | WEIGHT: 210 LBS | RESPIRATION RATE: 14 BRPM | HEIGHT: 71 IN | SYSTOLIC BLOOD PRESSURE: 110 MMHG | DIASTOLIC BLOOD PRESSURE: 68 MMHG | BODY MASS INDEX: 29.4 KG/M2 | TEMPERATURE: 97 F

## 2018-02-21 DIAGNOSIS — G44.52 NEW DAILY PERSISTENT HEADACHE (NDPH): Primary | ICD-10-CM

## 2018-02-21 PROCEDURE — 99213 OFFICE O/P EST LOW 20 MIN: CPT | Performed by: FAMILY MEDICINE

## 2018-02-21 NOTE — PROGRESS NOTES
[facility] Office Note  Chief Complaint:   Patient presents with:  Headache: frontal headache almost q day after gastric bypass surgery 8/2017  Dizziness: slight dizziness with headache      HPI:   This is a 60year old male comin N/A      Comment: Procedure: COLONOSCOPY;  Surgeon: David Alcala MD;  Location: [facility] ENDOSCOPY  08/14/2017: GASTRIC BYPASS,OBESE<100CM AMALIA-EN-Y  No date: HERNIA SURGERY  95: KNEE ARTHROSCP R Cuco Ballesteros 20  3/11: OTHER SURGICAL HISTORY      Comment: A mouth every 12 (twelve) hours. Disp: 1 capsule Rfl: 1   Albuterol Sulfate HFA (PROAIR HFA) 108 (90 Base) MCG/ACT Inhalation Aero Soln Inhale 2 puffs into the lungs every 4 (four) hours.  Disp:  Rfl:    Mometasone Furo-Formoterol Fum (DULERA) 200-5 MCG/ACT I tonsillar erythema or exudate. Mouth:  No oral lesions or ulcerations, no dental abnormalities noted.   LUNGS: clear to auscultation bilaterally, no rales/rhonchi/wheezing  HEART:  Regular rate and rhythm, no murmurs, rubs or gallops  ABDOMEN:  Soft, nondi CAD (coronary artery disease)     ICD (implantable cardioverter-defibrillator) in place     Allergic dermatitis     Costochondral chest pain     Nocturia     Hypertrophy of prostate with urinary obstruction and other lower urinary tract symptoms (LUTS)

## 2018-02-26 ENCOUNTER — HOSPITAL ENCOUNTER (OUTPATIENT)
Dept: CT IMAGING | Facility: HOSPITAL | Age: 63
Discharge: HOME | End: 2018-02-26
Attending: FAMILY MEDICINE
Payer: COMMERCIAL

## 2018-02-26 ENCOUNTER — TELEPHONE (OUTPATIENT)
Dept: FAMILY MEDICINE CLINIC | Facility: CLINIC | Age: 63
End: 2018-02-26

## 2018-02-26 DIAGNOSIS — G44.52 NEW DAILY PERSISTENT HEADACHE (NDPH): ICD-10-CM

## 2018-02-26 PROCEDURE — 70450 CT HEAD/BRAIN W/O DYE: CPT | Performed by: FAMILY MEDICINE

## 2018-02-26 NOTE — TELEPHONE ENCOUNTER
Called to pt and advised him of findings on the CT and dr recommendations. Pt voiced understanding and agreed to plan. When asked how he was feeling, pt stated that he is doing okay today.

## 2018-02-26 NOTE — TELEPHONE ENCOUNTER
Patient thought that Dr. Leonel Urbina stated his Brain CT would be with contrast.  When he had test completed they told him that it was not on the order. He just wanted to make sure that it was without contrast.  Please advise at [phone].

## 2018-02-27 ENCOUNTER — OFFICE VISIT (OUTPATIENT)
Dept: PHYSICAL THERAPY | Age: 63
End: 2018-02-27
Attending: FAMILY MEDICINE
Payer: COMMERCIAL

## 2018-02-27 PROCEDURE — 97110 THERAPEUTIC EXERCISES: CPT

## 2018-02-27 PROCEDURE — 97530 THERAPEUTIC ACTIVITIES: CPT

## 2018-02-27 NOTE — PROGRESS NOTES
Dx: Bilateral shoulder pain, rotator cuff syndrome, AC joint arthropathy           Authorized # of Visits:  PPO         Next MD visit: After PT  Fall Risk: standard         Precautions: Cancer, prostate- in remission and followed by oncologist       Cosmo Shay plateau in progress d/c to indep HEP. Patient/Family/Caregiver was advised of these findings, precautions, and treatment options and has agreed to actively participate in planning and for this course of care.     Thank you for your referral. If you have a reps x 2 sets Reassessment 10 min    STM to biceps tendon 5 min R/L Sidelying ER 20 reps x 2 sets 2nd set with 1 lbs Standing biceps stretch indep 10 sec hold 10 reps Bicep stretch 10 sec hold 5 reps R/L  Prone scapular retraction 10 reps x 2 sets -    Pos

## 2018-03-06 ENCOUNTER — OFFICE VISIT (OUTPATIENT)
Dept: PHYSICAL THERAPY | Age: 63
End: 2018-03-06
Attending: FAMILY MEDICINE
Payer: COMMERCIAL

## 2018-03-06 ENCOUNTER — OFFICE VISIT (OUTPATIENT)
Dept: SURGERY | Facility: CLINIC | Age: 63
End: 2018-03-06

## 2018-03-06 VITALS
SYSTOLIC BLOOD PRESSURE: 107 MMHG | WEIGHT: 207.69 LBS | BODY MASS INDEX: 29.08 KG/M2 | DIASTOLIC BLOOD PRESSURE: 69 MMHG | HEART RATE: 74 BPM | HEIGHT: 71 IN | RESPIRATION RATE: 16 BRPM

## 2018-03-06 PROCEDURE — 97110 THERAPEUTIC EXERCISES: CPT

## 2018-03-06 PROCEDURE — 97530 THERAPEUTIC ACTIVITIES: CPT

## 2018-03-06 NOTE — PROGRESS NOTES
[address] [place] [address]  [address]  [address] [address]  Dept: [phone]    3/6/2018    BARIATRIC EXISTING PATIENT/FOLLOW UP    HPI:  Suzann Cranker is a 60year old-year old male who pre

## 2018-03-06 NOTE — PROGRESS NOTES
Dx: Bilateral shoulder pain, rotator cuff syndrome, AC joint arthropathy           Authorized # of Visits:  PPO         Next MD visit: After PT  Fall Risk: standard         Precautions: Cancer, prostate- in remission and followed by oncologist       Trudy Agudelo visits)  · Pt will improve shoulder strength throughout to 4+/5 to improve function with ADLs including showering and dressing (8 visits)  · Pt will be independent and compliant with comprehensive HEP to maintain progress achieved in PT (ongoing)    Plan: ER isometrics 10 sec hold 10 reps R/L Shoulder extension green band 10 reps x 2 sets Sidelying ER no weight 15 reps *modified ROM   Sidelying ER 20 reps x 2 sets 2nd set with 1 lbs Scaption 10 reps no weight STM to biceps tendon and biceps on the L 6 min E tolerance will add: open book, sidelying ER, wall circles- will ice after workout   1/30/2018 open book, sidelying ER, wall circles, wall slides pain free ranges above and below arc, prone row  2/1/2018 biceps stretch   2/6/2018 scaption, isometric ER, bic

## 2018-03-21 ENCOUNTER — TELEPHONE (OUTPATIENT)
Dept: FAMILY MEDICINE CLINIC | Facility: CLINIC | Age: 63
End: 2018-03-21

## 2018-03-21 ENCOUNTER — OFFICE VISIT (OUTPATIENT)
Dept: FAMILY MEDICINE CLINIC | Facility: CLINIC | Age: 63
End: 2018-03-21

## 2018-03-21 VITALS
TEMPERATURE: 97 F | DIASTOLIC BLOOD PRESSURE: 70 MMHG | WEIGHT: 205 LBS | SYSTOLIC BLOOD PRESSURE: 128 MMHG | HEART RATE: 90 BPM | BODY MASS INDEX: 29 KG/M2 | RESPIRATION RATE: 14 BRPM

## 2018-03-21 DIAGNOSIS — M54.12 CERVICAL RADICULOPATHY, CHRONIC: ICD-10-CM

## 2018-03-21 DIAGNOSIS — M25.512 CHRONIC PAIN OF BOTH SHOULDERS: Primary | ICD-10-CM

## 2018-03-21 DIAGNOSIS — G89.29 CHRONIC PAIN OF BOTH SHOULDERS: ICD-10-CM

## 2018-03-21 DIAGNOSIS — M25.511 CHRONIC PAIN OF BOTH SHOULDERS: Primary | ICD-10-CM

## 2018-03-21 DIAGNOSIS — G89.29 CHRONIC PAIN OF BOTH SHOULDERS: Primary | ICD-10-CM

## 2018-03-21 DIAGNOSIS — M54.12 CERVICAL RADICULOPATHY: Primary | ICD-10-CM

## 2018-03-21 DIAGNOSIS — M25.511 CHRONIC PAIN OF BOTH SHOULDERS: ICD-10-CM

## 2018-03-21 DIAGNOSIS — M25.512 CHRONIC PAIN OF BOTH SHOULDERS: ICD-10-CM

## 2018-03-21 DIAGNOSIS — R42 LIGHTHEADEDNESS: ICD-10-CM

## 2018-03-21 PROCEDURE — 99214 OFFICE O/P EST MOD 30 MIN: CPT | Performed by: FAMILY MEDICINE

## 2018-03-21 NOTE — TELEPHONE ENCOUNTER
Patient saw Dr. Sunshine Melvin today. Was referred to Dr. Shakir Mas and Dr. Monica Chen. He is asking for there to be an order placed for a Left Should and left arm Xray so that both of the Doctors will have them prior to his visit.   He states he already has a Right s

## 2018-03-21 NOTE — TELEPHONE ENCOUNTER
Please order left x-ray shoulder and C-spine x-ray. He does not need an x-ray of his left forearm if we think it is coming from his neck.

## 2018-03-21 NOTE — PROGRESS NOTES
[facility] Office Note  Chief Complaint:   Patient presents with:  Pain: right shoulder pain, did PT      HPI:   This is a 60year old male coming in for chronic bilateral shoulder pain, lightheadedness and cervical radiculopathy. clot 2009   • Hypertrophy of prostate with urinary obstruction and other lower urinary tract symptoms (LUTS) 9/8/2015   • Morbid obesity with BMI of 40.0-44.9, adult ([facility]    • Nocturia 9/8/2015   • Nose disorder    • Other and unspecified hyperlipidemia Heart Disease Mother    • Other Aleah Sites Mother    • Other Aleah Sites Sister      REUMATOID ARTHRITIS   • Cancer Neg    • Stroke Neg      Allergies:    Bee Venom               Anaphylaxis  Current Meds:    Current Outpatient Prescriptions:  NASCOBAL 500 MCG/0. + chronic bilateral shoulder pain    EXAM:   /70 (BP Location: Left arm, Patient Position: Sitting, Cuff Size: adult)   Pulse 90   Temp (!) 97.1 °F (36.2 °C) (Oral)   Resp 14   Wt 205 lb   BMI 28.59 kg/m²  Estimated body mass index is 28.59 kg/m² as barriers to learning. Medical education done. Outcome: Patient verbalizes understanding. Patient is notified to call with any questions, complications, allergies, or worsening or changing symptoms.   Patient is to call with any side effects or complicatio

## 2018-03-27 ENCOUNTER — HOSPITAL ENCOUNTER (OUTPATIENT)
Dept: GENERAL RADIOLOGY | Facility: HOSPITAL | Age: 63
Discharge: HOME | End: 2018-03-27
Attending: FAMILY MEDICINE
Payer: COMMERCIAL

## 2018-03-27 DIAGNOSIS — M54.12 CERVICAL RADICULOPATHY: ICD-10-CM

## 2018-03-27 DIAGNOSIS — M25.511 CHRONIC PAIN OF BOTH SHOULDERS: ICD-10-CM

## 2018-03-27 DIAGNOSIS — G89.29 CHRONIC PAIN OF BOTH SHOULDERS: ICD-10-CM

## 2018-03-27 DIAGNOSIS — M25.512 CHRONIC PAIN OF BOTH SHOULDERS: ICD-10-CM

## 2018-03-27 PROCEDURE — 72052 X-RAY EXAM NECK SPINE 6/>VWS: CPT | Performed by: FAMILY MEDICINE

## 2018-03-27 PROCEDURE — 73030 X-RAY EXAM OF SHOULDER: CPT | Performed by: FAMILY MEDICINE

## 2018-04-01 ENCOUNTER — PATIENT MESSAGE (OUTPATIENT)
Dept: SURGERY | Facility: CLINIC | Age: 63
End: 2018-04-01

## 2018-04-02 NOTE — TELEPHONE ENCOUNTER
From: Miguel Shannon  To: Ty Mann  Sent: 4/1/2018 4:02 PM CDT  Subject: Visit Follow-up Question    Hi,     How long do you recommend waiting before resuming lifting weights after the cortisone shot you gave me on 3/31?  I was lifting 25 # du

## 2018-04-12 ENCOUNTER — PRIOR ORIGINAL RECORDS (OUTPATIENT)
Dept: OTHER | Age: 63
End: 2018-04-12

## 2018-04-26 ENCOUNTER — OFFICE VISIT (OUTPATIENT)
Dept: SURGERY | Facility: CLINIC | Age: 63
End: 2018-04-26

## 2018-04-26 VITALS
DIASTOLIC BLOOD PRESSURE: 70 MMHG | SYSTOLIC BLOOD PRESSURE: 104 MMHG | RESPIRATION RATE: 16 BRPM | BODY MASS INDEX: 27 KG/M2 | WEIGHT: 199 LBS | HEART RATE: 60 BPM

## 2018-04-26 DIAGNOSIS — M48.062 LUMBAR STENOSIS WITH NEUROGENIC CLAUDICATION: ICD-10-CM

## 2018-04-26 DIAGNOSIS — M50.021 CERVICAL DISC DISORDER AT C4-C5 LEVEL WITH MYELOPATHY: ICD-10-CM

## 2018-04-26 DIAGNOSIS — R26.9 NEUROLOGIC GAIT DYSFUNCTION: ICD-10-CM

## 2018-04-26 DIAGNOSIS — M48.02 CERVICAL STENOSIS OF SPINAL CANAL: Primary | ICD-10-CM

## 2018-04-26 DIAGNOSIS — R29.898 WEAKNESS OF BOTH UPPER EXTREMITIES: ICD-10-CM

## 2018-04-26 DIAGNOSIS — R29.898 WEAKNESS OF BOTH LOWER EXTREMITIES: ICD-10-CM

## 2018-04-26 PROCEDURE — 99215 OFFICE O/P EST HI 40 MIN: CPT | Performed by: PHYSICIAN ASSISTANT

## 2018-04-26 NOTE — PROGRESS NOTES
[facility] Neurosurgery eval      HISTORY OF PRESENT ILLNESS:Favio Mariano is a 60year old Rh male here for spinal evaluation. He gives a history of having a C5-6 ACDF  with Dr. Prince Mejia. He has a pacemaker defibrillator.   He had a history of a PE an • Hypertrophy of prostate with urinary obstruction and other lower urinary tract symptoms (LUTS) 9/8/2015   • Morbid obesity with BMI of 40.0-44.9, adult ([facility]    • Nocturia 9/8/2015   • Nose disorder    • Other and unspecified hyperlipidemia    • Prosta Anaphylaxis    MEDICATIONS:    Current Outpatient Prescriptions on File Prior to Visit:  NASCOBAL 500 MCG/0.1ML Nasal Solution  Disp:  Rfl:    Calcium Carbonate-Vit D-Min (CALCIUM 600+D3 PLUS MINERALS OR)  Disp:  Rfl:    Ferrous Sulfate (IRON) 325 (65 Fe) intact. Positive right clonus. Patient can heel and toe walk. Negative Spurling's. Negative Hebert's. No back pain on forward flexion extension.   Negative straight leg raise    Upper extremity strength: *Cervical flexion his upper and lower extremity mo Ronak Feliciaside, [address]  [phone]

## 2018-04-26 NOTE — PATIENT INSTRUCTIONS
Refill policies:    • Allow 2-3 business days for refills; controlled substances may take longer.   • Contact your pharmacy at least 5 days prior to running out of medication and have them send an electronic request or submit request through the “request re entire amount billed. Precertification and Prior Authorizations: If your physician has recommended that you have a procedure or additional testing performed.   [facility]) will contact your insurance carrier to obtain pre-certi his next available, I will see him after the myelogram and we could potentially schedule him for surgery

## 2018-04-26 NOTE — PROGRESS NOTES
Pt with previous history of cervical surgery in 3/2011 for pinched nerve by Dr. Ida Sherman, with continued pain and numbness in left forearm. Pt has not follow up with neurosurgery.     Location  of Pain: left arm    Date Pain Began: \"decades\"          W

## 2018-04-27 ENCOUNTER — TELEPHONE (OUTPATIENT)
Dept: SURGERY | Facility: CLINIC | Age: 63
End: 2018-04-27

## 2018-04-27 ENCOUNTER — PRIOR ORIGINAL RECORDS (OUTPATIENT)
Dept: OTHER | Age: 63
End: 2018-04-27

## 2018-05-10 ENCOUNTER — PRIOR ORIGINAL RECORDS (OUTPATIENT)
Dept: OTHER | Age: 63
End: 2018-05-10

## 2018-05-10 ENCOUNTER — NURSE ONLY (OUTPATIENT)
Dept: LAB | Facility: HOSPITAL | Age: 63
End: 2018-05-10
Attending: PHYSICIAN ASSISTANT
Payer: COMMERCIAL

## 2018-05-10 ENCOUNTER — HOSPITAL ENCOUNTER (OUTPATIENT)
Dept: CT IMAGING | Facility: HOSPITAL | Age: 63
Discharge: HOME | End: 2018-05-10
Attending: PHYSICIAN ASSISTANT
Payer: COMMERCIAL

## 2018-05-10 ENCOUNTER — HOSPITAL ENCOUNTER (OUTPATIENT)
Dept: GENERAL RADIOLOGY | Facility: HOSPITAL | Age: 63
Discharge: HOME | End: 2018-05-10
Attending: PHYSICIAN ASSISTANT
Payer: COMMERCIAL

## 2018-05-10 VITALS
TEMPERATURE: 98 F | DIASTOLIC BLOOD PRESSURE: 78 MMHG | BODY MASS INDEX: 26.82 KG/M2 | RESPIRATION RATE: 16 BRPM | OXYGEN SATURATION: 99 % | SYSTOLIC BLOOD PRESSURE: 122 MMHG | WEIGHT: 198 LBS | HEART RATE: 61 BPM | HEIGHT: 72 IN

## 2018-05-10 VITALS
RESPIRATION RATE: 15 BRPM | DIASTOLIC BLOOD PRESSURE: 71 MMHG | HEART RATE: 62 BPM | OXYGEN SATURATION: 97 % | SYSTOLIC BLOOD PRESSURE: 129 MMHG | TEMPERATURE: 98 F

## 2018-05-10 DIAGNOSIS — R29.898 WEAKNESS OF BOTH LOWER EXTREMITIES: ICD-10-CM

## 2018-05-10 DIAGNOSIS — M50.021 CERVICAL DISC DISORDER AT C4-C5 LEVEL WITH MYELOPATHY: ICD-10-CM

## 2018-05-10 DIAGNOSIS — R26.9 NEUROLOGIC GAIT DYSFUNCTION: ICD-10-CM

## 2018-05-10 DIAGNOSIS — G47.33 OSA ON CPAP: ICD-10-CM

## 2018-05-10 DIAGNOSIS — G44.89 OTHER HEADACHE SYNDROME: ICD-10-CM

## 2018-05-10 DIAGNOSIS — M48.02 CERVICAL STENOSIS OF SPINAL CANAL: ICD-10-CM

## 2018-05-10 DIAGNOSIS — Z99.89 OSA ON CPAP: ICD-10-CM

## 2018-05-10 DIAGNOSIS — Z98.84 H/O GASTRIC BYPASS: ICD-10-CM

## 2018-05-10 DIAGNOSIS — E66.3 OVERWEIGHT (BMI 25.0-29.9): ICD-10-CM

## 2018-05-10 DIAGNOSIS — R29.898 WEAKNESS OF BOTH UPPER EXTREMITIES: ICD-10-CM

## 2018-05-10 DIAGNOSIS — E55.9 VITAMIN D DEFICIENCY: ICD-10-CM

## 2018-05-10 DIAGNOSIS — M48.062 LUMBAR STENOSIS WITH NEUROGENIC CLAUDICATION: ICD-10-CM

## 2018-05-10 DIAGNOSIS — I10 ESSENTIAL HYPERTENSION: ICD-10-CM

## 2018-05-10 DIAGNOSIS — M48.02 CERVICAL SPINAL STENOSIS: Primary | ICD-10-CM

## 2018-05-10 DIAGNOSIS — M48.02 CERVICAL SPINAL STENOSIS: ICD-10-CM

## 2018-05-10 PROCEDURE — 80053 COMPREHEN METABOLIC PANEL: CPT

## 2018-05-10 PROCEDURE — 62305 MYELOGRAPHY LUMBAR INJECTION: CPT | Performed by: PHYSICIAN ASSISTANT

## 2018-05-10 PROCEDURE — 84443 ASSAY THYROID STIM HORMONE: CPT

## 2018-05-10 PROCEDURE — 72132 CT LUMBAR SPINE W/DYE: CPT | Performed by: PHYSICIAN ASSISTANT

## 2018-05-10 PROCEDURE — 72126 CT NECK SPINE W/DYE: CPT | Performed by: PHYSICIAN ASSISTANT

## 2018-05-10 PROCEDURE — 85025 COMPLETE CBC W/AUTO DIFF WBC: CPT

## 2018-05-10 PROCEDURE — 84425 ASSAY OF VITAMIN B-1: CPT

## 2018-05-10 PROCEDURE — 80061 LIPID PANEL: CPT

## 2018-05-10 PROCEDURE — 36415 COLL VENOUS BLD VENIPUNCTURE: CPT

## 2018-05-10 PROCEDURE — 82607 VITAMIN B-12: CPT

## 2018-05-10 PROCEDURE — 82306 VITAMIN D 25 HYDROXY: CPT

## 2018-05-10 PROCEDURE — 85610 PROTHROMBIN TIME: CPT

## 2018-05-10 RX ORDER — ACETAMINOPHEN 500 MG
1000 TABLET ORAL ONCE
Status: COMPLETED | OUTPATIENT
Start: 2018-05-10 | End: 2018-05-10

## 2018-05-10 RX ORDER — DIAZEPAM 5 MG/1
10 TABLET ORAL
Status: COMPLETED | OUTPATIENT
Start: 2018-05-10 | End: 2018-05-10

## 2018-05-10 RX ORDER — SODIUM CHLORIDE 9 MG/ML
INJECTION, SOLUTION INTRAVENOUS CONTINUOUS
Status: DISCONTINUED | OUTPATIENT
Start: 2018-05-10 | End: 2018-05-12

## 2018-05-10 RX ORDER — DIAZEPAM 5 MG/1
TABLET ORAL
Status: COMPLETED
Start: 2018-05-10 | End: 2018-05-10

## 2018-05-10 RX ADMIN — DIAZEPAM 10 MG: 5 TABLET ORAL at 07:20:00

## 2018-05-10 RX ADMIN — ACETAMINOPHEN 1000 MG: 500 MG TABLET ORAL at 10:27:00

## 2018-05-10 RX ADMIN — SODIUM CHLORIDE: 9 INJECTION, SOLUTION INTRAVENOUS at 07:15:00

## 2018-05-21 ENCOUNTER — TELEPHONE (OUTPATIENT)
Dept: SURGERY | Facility: CLINIC | Age: 63
End: 2018-05-21

## 2018-05-21 ENCOUNTER — OFFICE VISIT (OUTPATIENT)
Dept: SURGERY | Facility: CLINIC | Age: 63
End: 2018-05-21

## 2018-05-21 VITALS — DIASTOLIC BLOOD PRESSURE: 70 MMHG | HEART RATE: 60 BPM | SYSTOLIC BLOOD PRESSURE: 102 MMHG

## 2018-05-21 DIAGNOSIS — Z98.1 S/P CERVICAL SPINAL FUSION: ICD-10-CM

## 2018-05-21 DIAGNOSIS — M50.021 CERVICAL DISC DISORDER AT C4-C5 LEVEL WITH MYELOPATHY: Primary | ICD-10-CM

## 2018-05-21 DIAGNOSIS — R26.9 NEUROLOGIC GAIT DYSFUNCTION: ICD-10-CM

## 2018-05-21 DIAGNOSIS — R29.898 WEAKNESS OF BOTH UPPER EXTREMITIES: ICD-10-CM

## 2018-05-21 DIAGNOSIS — M48.061 SPINAL STENOSIS OF LUMBAR REGION WITHOUT NEUROGENIC CLAUDICATION: ICD-10-CM

## 2018-05-21 DIAGNOSIS — R29.898 WEAKNESS OF BOTH LOWER EXTREMITIES: ICD-10-CM

## 2018-05-21 PROCEDURE — 99214 OFFICE O/P EST MOD 30 MIN: CPT | Performed by: PHYSICIAN ASSISTANT

## 2018-05-21 NOTE — PATIENT INSTRUCTIONS
Refill policies:    • Allow 2-3 business days for refills; controlled substances may take longer.   • Contact your pharmacy at least 5 days prior to running out of medication and have them send an electronic request or submit request through the “request re entire amount billed. Precertification and Prior Authorizations: If your physician has recommended that you have a procedure or additional testing performed.   [facility]) will contact your insurance carrier to obtain pre-certi contact the Pre-admission department at [phone] to schedule your pre-op testing. They will get you scheduled for all the blood work, chest xray and EKG if needed. You will be informed about a spine class as it related to your surgery.  Although the cl through Friday                             8am-5pm    · You will need an external bone growth stimulator. If ordered for your surgery the vendor, someone from [employer] or [employer] will contact you either before or after your surgery.  (If appropriate)  · If you

## 2018-05-21 NOTE — PROGRESS NOTES
[facility] Neurosurgery eval      HISTORY OF PRESENT ILLNESS:Favio MIKEY Pj Bo is a 60year old Rh male here to discuss CS and LS myelogram. Denies lumbar radiculopathy. Denies neurogenic claudication. Last eval:  for spinal evaluation.   He gives a history of High blood pressure    • High cholesterol    • History of blood clots     PE 2014, post-op, Left arm blood clot 2009   • Hypertrophy of prostate with urinary obstruction and other lower urinary tract symptoms (LUTS) 9/8/2015   • Morbid obesity with BMI of used smokeless tobacco. He reports that he drinks alcohol. He reports that he does not use drugs.     ALLERGIES:    Bee Venom               ANAPHYLAXIS    MEDICATIONS:    Current Outpatient Prescriptions on File Prior to Visit:  NASCOBAL 500 MCG/0.1ML Nasal cervical range of motion on flexion-extension. sensation to light touch is intact bilateral in both arms and legs except the left hand and all 5 fingers. Gait intact. Positive right clonus. Patient can heel and toe walk. Negative Spurling's.  Negative Alexa Roxanne made need C2-3 posterior at a later date    Dr Donato Meyer evaluated the patient and is in agreement. Risks and benefits were discussed at length.  To included expected outcome from surgery, unexpected outcomes from surgery, and associated risks with any surgi

## 2018-05-21 NOTE — PROGRESS NOTES
LOV 4/26/18    Pt is here for follow up after imaging  Pain currently 1-2/10 in lower cervical portion and shoulders

## 2018-05-22 NOTE — TELEPHONE ENCOUNTER
Patient was seen in the office yesterday 5/21/18. Below information was reviewed at that appointment. You are to be scheduled for ANTERIOR CERVICAL DISCECTOMY AND FUSION EXTENSION. CERVICAL 3-4, CERVICAL 4-5, CERVICAL 6-7.  POSSIBLE PLATE REMOVAL CERVICA 23 hour admission. · The hospital will contact you 1-2 days before surgery with your arrival time. · PCP and cardiac clearance are needed, please contact their office for appointment. · You will need an Aspen cervical collar.  If ordered for your surger [id number]  ICD-10: M50.021, R29.898, R26.9, Z98.1

## 2018-05-23 ENCOUNTER — PRIOR ORIGINAL RECORDS (OUTPATIENT)
Dept: OTHER | Age: 63
End: 2018-05-23

## 2018-05-25 ENCOUNTER — TELEPHONE (OUTPATIENT)
Dept: FAMILY MEDICINE CLINIC | Facility: CLINIC | Age: 63
End: 2018-05-25

## 2018-05-25 ENCOUNTER — OFFICE VISIT (OUTPATIENT)
Dept: SURGERY | Facility: CLINIC | Age: 63
End: 2018-05-25

## 2018-05-25 ENCOUNTER — PATIENT MESSAGE (OUTPATIENT)
Dept: SURGERY | Facility: CLINIC | Age: 63
End: 2018-05-25

## 2018-05-25 VITALS
WEIGHT: 188.88 LBS | SYSTOLIC BLOOD PRESSURE: 111 MMHG | HEART RATE: 61 BPM | BODY MASS INDEX: 26 KG/M2 | OXYGEN SATURATION: 97 % | DIASTOLIC BLOOD PRESSURE: 72 MMHG | RESPIRATION RATE: 16 BRPM

## 2018-05-25 DIAGNOSIS — E66.3 OVERWEIGHT (BMI 25.0-29.9): ICD-10-CM

## 2018-05-25 DIAGNOSIS — Z98.84 H/O GASTRIC BYPASS: ICD-10-CM

## 2018-05-25 DIAGNOSIS — G47.33 OSA ON CPAP: ICD-10-CM

## 2018-05-25 DIAGNOSIS — I10 ESSENTIAL HYPERTENSION: Primary | ICD-10-CM

## 2018-05-25 DIAGNOSIS — Z99.89 OSA ON CPAP: ICD-10-CM

## 2018-05-25 DIAGNOSIS — Z01.818 PRE-OP TESTING: Primary | ICD-10-CM

## 2018-05-25 PROBLEM — E66.9 OBESITY (BMI 30-39.9): Status: RESOLVED | Noted: 2017-11-22 | Resolved: 2018-05-25

## 2018-05-25 PROBLEM — K21.9 GERD (GASTROESOPHAGEAL REFLUX DISEASE): Status: RESOLVED | Noted: 2017-02-20 | Resolved: 2018-05-25

## 2018-05-25 PROBLEM — E66.9 OBESITY: Status: RESOLVED | Noted: 2017-08-14 | Resolved: 2018-05-25

## 2018-05-25 PROBLEM — G44.89 OTHER HEADACHE SYNDROME: Status: RESOLVED | Noted: 2018-02-19 | Resolved: 2018-05-25

## 2018-05-25 PROCEDURE — 99214 OFFICE O/P EST MOD 30 MIN: CPT | Performed by: INTERNAL MEDICINE

## 2018-05-25 NOTE — PROGRESS NOTES
[address], [facility] [address]  Dept: [phone]    Date: 2017    Patient:  Dorian Cedillo  :      10/12/1955  MRN:      [id number]    Referring Provider: Inhale 2 puffs into the lungs every 4 (four) hours as needed. , Disp: , Rfl:   •  Mometasone Furo-Formoterol Fum (DULERA) 200-5 MCG/ACT Inhalation Aerosol, Inhale 2 puffs into the lungs 2 (two) times daily. , Disp: , Rfl:   •  Multiple Vitamins-Minerals (M Vero Flatten Father    • benign prostatic hypertrophy [OTHER] Father      x4   • CABG [OTHER] Father    • Heart Disorder Mother    • Asthma Mother    • Hypertension Mother    • Heart Disease Mother    • Other Vero Flatten Mother    • Other [place] [employer] 5-6 per day. DYSLIPIDEMIA: Stable on the above prescribed meal plan and medication. Liver function stable.       Lab Results  Component Value Date/Time   CHOLEST 111 05/10/2018 06:34 AM   LDL 56 05/10/2018 06:34 AM   HDL 44 (L) 05/10/2018 06:34 AM   TR

## 2018-05-25 NOTE — TELEPHONE ENCOUNTER
incoming fax received from [employer].   Medical clearance, EKG, MRSA/MSSA, CMP, CBC, PT/PTT/NR request.  Patient is scheduled to have Anterior Cervical Discectomy and Fusion Extension, Cervical 3-4, Cervical 4-5, Cervical 6-7, Possible

## 2018-05-29 NOTE — TELEPHONE ENCOUNTER
From: Cornelius Gutierrez  To: Ty Lnua  Sent: 5/25/2018 2:57 PM CDT  Subject: Visit Follow-up Question    Hello again,    I forgot to mention in my previous message that I am on a waiting list to see Dr Jason Fenton for my cardiac clearance as he has

## 2018-05-30 ENCOUNTER — PRIOR ORIGINAL RECORDS (OUTPATIENT)
Dept: OTHER | Age: 63
End: 2018-05-30

## 2018-05-31 ENCOUNTER — TELEPHONE (OUTPATIENT)
Dept: NEUROLOGY | Facility: CLINIC | Age: 63
End: 2018-05-31

## 2018-05-31 ENCOUNTER — PATIENT MESSAGE (OUTPATIENT)
Dept: SURGERY | Facility: CLINIC | Age: 63
End: 2018-05-31

## 2018-05-31 NOTE — TELEPHONE ENCOUNTER
Cardiac clearance received from Dr. Roman Rowland \"Acceptable Cardiac Clearance with small risk of stroke\"  May hold Xarelto for 2 days prior and 5 days post surgery.      Will have to see if this is acceptable for Dr. Tima Strauss    Need PCP clearance  PA still neede

## 2018-05-31 NOTE — TELEPHONE ENCOUNTER
Returned pt's call, he was wanting to postpone his surgery by 1 week, informed him Dr. Ani Whelan is already booked up and not available for a surgery of this length until Aug 1st. Pt decided to keep current surgical date. Nothing further.     Informed him if he

## 2018-06-01 ENCOUNTER — OFFICE VISIT (OUTPATIENT)
Dept: NEUROLOGY | Facility: CLINIC | Age: 63
End: 2018-06-01

## 2018-06-01 VITALS
HEART RATE: 60 BPM | DIASTOLIC BLOOD PRESSURE: 68 MMHG | WEIGHT: 188 LBS | BODY MASS INDEX: 26 KG/M2 | RESPIRATION RATE: 16 BRPM | SYSTOLIC BLOOD PRESSURE: 126 MMHG

## 2018-06-01 DIAGNOSIS — G44.219 EPISODIC TENSION-TYPE HEADACHE, NOT INTRACTABLE: ICD-10-CM

## 2018-06-01 DIAGNOSIS — R42 ORTHOSTATIC LIGHTHEADEDNESS: ICD-10-CM

## 2018-06-01 DIAGNOSIS — R41.3 MEMORY LOSS: Primary | ICD-10-CM

## 2018-06-01 PROBLEM — G44.209 TENSION TYPE HEADACHE: Status: ACTIVE | Noted: 2018-02-19

## 2018-06-01 PROCEDURE — 99205 OFFICE O/P NEW HI 60 MIN: CPT | Performed by: OTHER

## 2018-06-01 NOTE — TELEPHONE ENCOUNTER
From: Solomon Yoder  To: Ty Nunez  Sent: 5/31/2018 2:24 PM CDT  Subject: Other    Hello again,    I was told by Dr. Giancarlo santos (one of my cardiologists) that Dr. Emiliana Saucedo (my electrophysiologist) already submitted Cardiac clearanc

## 2018-06-01 NOTE — PATIENT INSTRUCTIONS
Refill policies:    • Allow 2-3 business days for refills; controlled substances may take longer.   • Contact your pharmacy at least 5 days prior to running out of medication and have them send an electronic request or submit request through the “request re entire amount billed. Precertification and Prior Authorizations: If your physician has recommended that you have a procedure or additional testing performed.   [facility]) will contact your insurance carrier to obtain pre-certi

## 2018-06-01 NOTE — PROGRESS NOTES
Neurology H&P    Gena Mercado Patient Status:  No patient class for patient encounter    10/12/1955 MRN [id number]   Location ED [facility], [address], [address] [facility] Attending No att. providers found   Hosp Day # 0 PCP THEO SOSA, DO me that that he has been having frequent headaches. He tells me that he has been getting headaches since he had a bariatric surgery last June 2017. He states that he only gets headaches when he eats too much.  He states that he pretty much does not have any daily.   Disp:  Rfl:    Tiotropium Bromide Monohydrate (SPIRIVA HANDIHALER) 18 MCG Inhalation Cap Inhale 18 mcg into the lungs daily. Disp:  Rfl:    Nebivolol HCl (BYSTOLIC) 5 MG Oral Tab Take 5 mg by mouth every morning.    Disp:  Rfl:        Problem List: Nose disorder    • Other and unspecified hyperlipidemia    • Prostate cancer ([facility] 10/20/2016   • Pulmonary embolism ([facility] 2014    post-op bicep tendon repair   • Unspecified essential hypertension    • Unspecified sleep apnea     cpap   • V-tach ([facility] no unexplained weight loss  Vision: no vision changes, no new blurry or double vision  Head and Neck: no eye or ear discharge  Pulmonary: no SOB, no new cough  CV: no chest pain, no new lower extremity swelling  GI: no constipation or abdominal pain  : d Assessment: This is a 61 y/o male with multiple complaints today including dizziness that is only present when bending over and then standing up. It is not vertiginous.  He states that he has had the exact same symptoms on the past which were related t

## 2018-06-04 ENCOUNTER — PRIOR ORIGINAL RECORDS (OUTPATIENT)
Dept: OTHER | Age: 63
End: 2018-06-04

## 2018-06-13 ENCOUNTER — HOSPITAL ENCOUNTER (OUTPATIENT)
Dept: PHYSICAL THERAPY | Facility: HOSPITAL | Age: 63
Discharge: HOME | End: 2018-06-13
Attending: NEUROLOGICAL SURGERY
Payer: COMMERCIAL

## 2018-06-13 ENCOUNTER — TELEPHONE (OUTPATIENT)
Dept: SURGERY | Facility: CLINIC | Age: 63
End: 2018-06-13

## 2018-06-13 ENCOUNTER — LABORATORY ENCOUNTER (OUTPATIENT)
Dept: LAB | Facility: HOSPITAL | Age: 63
End: 2018-06-13
Attending: NEUROLOGICAL SURGERY
Payer: COMMERCIAL

## 2018-06-13 DIAGNOSIS — I10 ESSENTIAL HYPERTENSION: ICD-10-CM

## 2018-06-13 DIAGNOSIS — Z01.818 PRE-OP TESTING: ICD-10-CM

## 2018-06-13 DIAGNOSIS — I42.0 DILATED CARDIOMYOPATHY (HCC): Chronic | ICD-10-CM

## 2018-06-13 DIAGNOSIS — I42.0 DILATED CARDIOMYOPATHY (HCC): ICD-10-CM

## 2018-06-13 DIAGNOSIS — R29.898 WEAKNESS OF BOTH LOWER EXTREMITIES: ICD-10-CM

## 2018-06-13 DIAGNOSIS — Z95.810 ICD (IMPLANTABLE CARDIOVERTER-DEFIBRILLATOR) IN PLACE: ICD-10-CM

## 2018-06-13 DIAGNOSIS — R29.898 WEAKNESS OF BOTH UPPER EXTREMITIES: ICD-10-CM

## 2018-06-13 DIAGNOSIS — M50.00 CERVICAL DISC DISORDER WITH MYELOPATHY: Primary | ICD-10-CM

## 2018-06-13 DIAGNOSIS — Z98.1 S/P CERVICAL SPINAL FUSION: ICD-10-CM

## 2018-06-13 PROCEDURE — 80053 COMPREHEN METABOLIC PANEL: CPT

## 2018-06-13 PROCEDURE — 87081 CULTURE SCREEN ONLY: CPT

## 2018-06-13 PROCEDURE — 85025 COMPLETE CBC W/AUTO DIFF WBC: CPT

## 2018-06-13 PROCEDURE — 85730 THROMBOPLASTIN TIME PARTIAL: CPT

## 2018-06-13 PROCEDURE — 36415 COLL VENOUS BLD VENIPUNCTURE: CPT

## 2018-06-13 PROCEDURE — 85610 PROTHROMBIN TIME: CPT

## 2018-06-14 NOTE — TELEPHONE ENCOUNTER
Patient has appointment 6/20/18 with PCP. PA pending. See referral pool for additional information.   Pending prior auth #: [id number]

## 2018-06-14 NOTE — TELEPHONE ENCOUNTER
Spoke with patient who stated he contacted [facility] but they did not have the order. Patient has appointment for fitting tomorrow. Informed patient we can refax the order today. Order was re-faxed this morning.

## 2018-06-20 ENCOUNTER — OFFICE VISIT (OUTPATIENT)
Dept: FAMILY MEDICINE CLINIC | Facility: CLINIC | Age: 63
End: 2018-06-20

## 2018-06-20 VITALS
HEART RATE: 76 BPM | HEIGHT: 72 IN | TEMPERATURE: 98 F | WEIGHT: 187 LBS | SYSTOLIC BLOOD PRESSURE: 130 MMHG | BODY MASS INDEX: 25.33 KG/M2 | DIASTOLIC BLOOD PRESSURE: 70 MMHG | RESPIRATION RATE: 14 BRPM

## 2018-06-20 DIAGNOSIS — I10 ESSENTIAL HYPERTENSION WITH GOAL BLOOD PRESSURE LESS THAN 130/80: ICD-10-CM

## 2018-06-20 DIAGNOSIS — Z86.711 HISTORY OF PULMONARY EMBOLISM: ICD-10-CM

## 2018-06-20 DIAGNOSIS — I42.0 DILATED CARDIOMYOPATHY (HCC): ICD-10-CM

## 2018-06-20 DIAGNOSIS — I25.10 CORONARY ARTERY DISEASE INVOLVING NATIVE HEART WITHOUT ANGINA PECTORIS, UNSPECIFIED VESSEL OR LESION TYPE: ICD-10-CM

## 2018-06-20 DIAGNOSIS — Z01.818 PREOPERATIVE CLEARANCE: Primary | ICD-10-CM

## 2018-06-20 DIAGNOSIS — G47.33 OSA ON CPAP: ICD-10-CM

## 2018-06-20 DIAGNOSIS — Z99.89 OSA ON CPAP: ICD-10-CM

## 2018-06-20 LAB
ALBUMIN: 3.5 G/DL
ALKALINE PHOSPHATATE(ALK PHOS): 97 IU/L
BILIRUBIN TOTAL: 1.3 MG/DL
BUN: 18 MG/DL
CALCIUM: 8.8 MG/DL
CHLORIDE: 107 MEQ/L
CHOLESTEROL, TOTAL: 111 MG/DL
CREATININE, SERUM: 0.71 MG/DL
GLUCOSE: 91 MG/DL
HDL CHOLESTEROL: 44 MG/DL
HEMATOCRIT: 42.4 %
HEMOGLOBIN: 14.1 G/DL
LDL CHOLESTEROL: 56 MG/DL
PLATELETS: 198 K/UL
POTASSIUM, SERUM: 3.9 MEQ/L
PROTEIN, TOTAL: 6.9 G/DL
RED BLOOD COUNT: 4.66 X 10-6/U
SGOT (AST): 31 IU/L
SGPT (ALT): 47 IU/L
SODIUM: 142 MEQ/L
THYROID STIMULATING HORMONE: 3.63 MLU/L
TRIGLYCERIDES: 53 MG/DL
VITAMIN D 25-OH: 55.2 NG/ML
WHITE BLOOD COUNT: 4.6 X 10-3/U

## 2018-06-20 PROCEDURE — 99243 OFF/OP CNSLTJ NEW/EST LOW 30: CPT | Performed by: FAMILY MEDICINE

## 2018-06-20 NOTE — PROGRESS NOTES
[facility] Office Note  Chief Complaint:   Patient presents with:  Pre-Op Exam: DR Ani Whelan 6/27/18 at [facility], Fusion C4 TO C7      HPI:   This is a 60year old male coming in for preop medical clearance for anterior cervical discectomy 10/20/2016   • Pulmonary embolism ([facility] 2014    post-op bicep tendon repair   • Unspecified essential hypertension    • Unspecified sleep apnea     cpap   • V-tach [facility])    • Visual impairment     glasses     Past Surgical History:  No date: ANGIOGRAM  200 10 mg by mouth nightly. Disp:  Rfl:    Rivaroxaban (XARELTO) 20 MG Oral Tab Take 20 mg by mouth daily with food. Disp:  Rfl:    betamethasone dipropionate 0.05 % External Cream Apply 1 Application topically 2 (two) times daily.  Disp: 90 g Rfl: 1   Percy Goodwin Size: adult)   Pulse 76   Temp 97.5 °F (36.4 °C) (Oral)   Resp 14   Ht 72\"   Wt 187 lb   BMI 25.36 kg/m²  Estimated body mass index is 25.36 kg/m² as calculated from the following:    Height as of this encounter: 72\".     Weight as of this encounter: 187 Continue xarelto and may need heparin following surgery prior to resuming xarelto for PE prevention  -  Will await recs from cardiology regarding anticoagulation    6.  LEMUEL on CPAP  -  Stable      Meds & Refills for this Visit:  No prescriptions requested o Overweight (BMI 25.0-29. 9)     Memory loss     Orthostatic lightheadedness     History of pulmonary embolism      THEO SOSA DO  6/20/2018  9:18 AM

## 2018-06-20 NOTE — TELEPHONE ENCOUNTER
Per PCP- 6/20/18: \"Preoperative clearance  -  Patient is medically clear for anterior cervical discectomy and fusion extension at C3/C4, C/C5 and C6/C7 with possible plate removal at C6/F3, allograft, plate and screws, possible cages and stand kyle

## 2018-06-20 NOTE — TELEPHONE ENCOUNTER
Surgery denial received, See Referral pool for more information. Peer to peer will need to be done,   Call [phone] to schedule.

## 2018-06-21 ENCOUNTER — TELEPHONE (OUTPATIENT)
Dept: FAMILY MEDICINE CLINIC | Facility: CLINIC | Age: 63
End: 2018-06-21

## 2018-06-21 ENCOUNTER — MED REC SCAN ONLY (OUTPATIENT)
Dept: FAMILY MEDICINE CLINIC | Facility: CLINIC | Age: 63
End: 2018-06-21

## 2018-06-21 NOTE — TELEPHONE ENCOUNTER
Patient called and informed of update with insurance. Patient understood and would like to be contacted with insurance decision.

## 2018-06-21 NOTE — TELEPHONE ENCOUNTER
Spoke with pt and he states he had EKG done at Dr Elina Jacobs office. Pt will call them and request EKG results be sent to pre adm testing. Spoke with Alexa Meek at pre adm testing and informed her that pt's cardio did EKG and will be sending results.

## 2018-06-21 NOTE — TELEPHONE ENCOUNTER
Sudarshan Asencio from [facility] Pre Admission calling b/c pt has not done EKG yet for surgery on 6/29. Pt had pre-op yesterday but EKG order is from 5/25.

## 2018-06-22 NOTE — TELEPHONE ENCOUNTER
Spoke with TIERRA Torre who spoke with Kate Maier. Peer to peer was done and approved. Insurance will be faxing office approval information. LMTCB.  Need to inform patient of insurance approval.

## 2018-06-22 NOTE — TELEPHONE ENCOUNTER
Patient returned [place] call. Patient was informed his surgery has been approved. Patient very happy. [facility]

## 2018-06-26 ENCOUNTER — ANESTHESIA EVENT (OUTPATIENT)
Dept: SURGERY | Facility: HOSPITAL | Age: 63
DRG: 472 | End: 2018-06-26
Payer: COMMERCIAL

## 2018-06-26 NOTE — TELEPHONE ENCOUNTER
Andrew, From [facility] insurance calling states he was told this surgery is still denied. Informed him Peer to peer was done. He will call later to see if this is updated    Called [facility]:  See referral pool for more information.

## 2018-06-26 NOTE — TELEPHONE ENCOUNTER
[facility] insurance verification called; surgery has not been approved.  Kush Chavarria [phone]

## 2018-06-26 NOTE — ICD/PM
I confirmed with [facility] that a rep will be available to turn ICD therapies off prior to cervical spine surgery. He will need to be on continuous cardiac monitor during transport and holding while ICD is off.    Rep can be reached at [phone]

## 2018-06-27 ENCOUNTER — ANESTHESIA (OUTPATIENT)
Dept: SURGERY | Facility: HOSPITAL | Age: 63
DRG: 472 | End: 2018-06-27
Payer: COMMERCIAL

## 2018-06-27 ENCOUNTER — APPOINTMENT (OUTPATIENT)
Dept: GENERAL RADIOLOGY | Facility: HOSPITAL | Age: 63
DRG: 472 | End: 2018-06-27
Attending: NEUROLOGICAL SURGERY
Payer: COMMERCIAL

## 2018-06-27 ENCOUNTER — TELEPHONE (OUTPATIENT)
Dept: SURGERY | Facility: CLINIC | Age: 63
End: 2018-06-27

## 2018-06-27 ENCOUNTER — HOSPITAL ENCOUNTER (INPATIENT)
Facility: HOSPITAL | Age: 63
LOS: 8 days | Discharge: HOME HEALTH | DRG: 472 | End: 2018-07-06
Attending: NEUROLOGICAL SURGERY | Admitting: NEUROLOGICAL SURGERY
Payer: COMMERCIAL

## 2018-06-27 DIAGNOSIS — I42.0 DILATED CARDIOMYOPATHY (HCC): Primary | Chronic | ICD-10-CM

## 2018-06-27 DIAGNOSIS — Z46.59 ENCOUNTER FOR NASOGASTRIC (NG) TUBE PLACEMENT: ICD-10-CM

## 2018-06-27 DIAGNOSIS — M50.021 CERVICAL DISC DISORDER AT C4-C5 LEVEL WITH MYELOPATHY: ICD-10-CM

## 2018-06-27 DIAGNOSIS — R29.898 WEAKNESS OF BOTH LOWER EXTREMITIES: ICD-10-CM

## 2018-06-27 DIAGNOSIS — Z95.810 ICD (IMPLANTABLE CARDIOVERTER-DEFIBRILLATOR) IN PLACE: ICD-10-CM

## 2018-06-27 DIAGNOSIS — Z98.1 S/P CERVICAL SPINAL FUSION: ICD-10-CM

## 2018-06-27 DIAGNOSIS — M50.90 CERVICAL DISC DISORDER: ICD-10-CM

## 2018-06-27 DIAGNOSIS — R29.898 WEAKNESS OF BOTH UPPER EXTREMITIES: ICD-10-CM

## 2018-06-27 DIAGNOSIS — R26.9 NEUROLOGIC GAIT DYSFUNCTION: ICD-10-CM

## 2018-06-27 DIAGNOSIS — I10 ESSENTIAL HYPERTENSION: ICD-10-CM

## 2018-06-27 LAB
APTT PPP: 33 SECONDS (ref 26.1–34.6)
GLUCOSE BLD-MCNC: 166 MG/DL (ref 70–99)
INR BLD: 1.1 (ref 0.9–1.1)
ISTAT BLOOD GAS BASE DEFICIT: -1 MMOL/L
ISTAT BLOOD GAS HCO3: 24.3 MEQ/L (ref 22–26)
ISTAT BLOOD GAS O2 SATURATION: 100 % (ref 92–100)
ISTAT BLOOD GAS PCO2: 40 MMHG (ref 35–45)
ISTAT BLOOD GAS PH: 7.39 (ref 7.35–7.45)
ISTAT BLOOD GAS PO2: 179 MMHG (ref 80–105)
ISTAT BLOOD GAS TCO2: 26 MMOL/L (ref 22–32)
ISTAT HEMATOCRIT: 41 % (ref 37–53)
ISTAT IONIZED CALCIUM: 1.16 MMOL/L (ref 1.12–1.32)
ISTAT POTASSIUM: 4.1 MMOL/L (ref 3.6–5.1)
ISTAT SODIUM: 143 MMOL/L (ref 136–144)
PSA SERPL DL<=0.01 NG/ML-MCNC: 14.6 SECONDS (ref 12.4–14.7)

## 2018-06-27 PROCEDURE — 76001 XR FLUOROSCOPE EXAM >1 HR EXTENSIVE (CPT=76001): CPT | Performed by: NEUROLOGICAL SURGERY

## 2018-06-27 PROCEDURE — 0RB30ZZ EXCISION OF CERVICAL VERTEBRAL DISC, OPEN APPROACH: ICD-10-PCS | Performed by: NEUROLOGICAL SURGERY

## 2018-06-27 PROCEDURE — 0RG20A0 FUSION OF 2 OR MORE CERVICAL VERTEBRAL JOINTS WITH INTERBODY FUSION DEVICE, ANTERIOR APPROACH, ANTERIOR COLUMN, OPEN APPROACH: ICD-10-PCS | Performed by: NEUROLOGICAL SURGERY

## 2018-06-27 RX ORDER — HYDROCODONE BITARTRATE AND ACETAMINOPHEN 5; 325 MG/1; MG/1
1 TABLET ORAL AS NEEDED
Status: DISCONTINUED | OUTPATIENT
Start: 2018-06-27 | End: 2018-06-28 | Stop reason: HOSPADM

## 2018-06-27 RX ORDER — BACITRACIN 50000 [USP'U]/1
INJECTION, POWDER, LYOPHILIZED, FOR SOLUTION INTRAMUSCULAR AS NEEDED
Status: DISCONTINUED | OUTPATIENT
Start: 2018-06-27 | End: 2018-06-27 | Stop reason: HOSPADM

## 2018-06-27 RX ORDER — NALOXONE HYDROCHLORIDE 0.4 MG/ML
80 INJECTION, SOLUTION INTRAMUSCULAR; INTRAVENOUS; SUBCUTANEOUS AS NEEDED
Status: DISCONTINUED | OUTPATIENT
Start: 2018-06-27 | End: 2018-06-28 | Stop reason: HOSPADM

## 2018-06-27 RX ORDER — CEFAZOLIN SODIUM/WATER 2 G/20 ML
2 SYRINGE (ML) INTRAVENOUS ONCE
Status: COMPLETED | OUTPATIENT
Start: 2018-06-27 | End: 2018-06-27

## 2018-06-27 RX ORDER — SODIUM CHLORIDE, SODIUM LACTATE, POTASSIUM CHLORIDE, CALCIUM CHLORIDE 600; 310; 30; 20 MG/100ML; MG/100ML; MG/100ML; MG/100ML
INJECTION, SOLUTION INTRAVENOUS CONTINUOUS
Status: DISCONTINUED | OUTPATIENT
Start: 2018-06-27 | End: 2018-06-28 | Stop reason: HOSPADM

## 2018-06-27 RX ORDER — MEPERIDINE HYDROCHLORIDE 25 MG/ML
12.5 INJECTION INTRAMUSCULAR; INTRAVENOUS; SUBCUTANEOUS AS NEEDED
Status: DISCONTINUED | OUTPATIENT
Start: 2018-06-27 | End: 2018-06-28 | Stop reason: HOSPADM

## 2018-06-27 RX ORDER — HYDROMORPHONE HYDROCHLORIDE 1 MG/ML
0.5 INJECTION, SOLUTION INTRAMUSCULAR; INTRAVENOUS; SUBCUTANEOUS EVERY 5 MIN PRN
Status: DISCONTINUED | OUTPATIENT
Start: 2018-06-27 | End: 2018-06-28 | Stop reason: HOSPADM

## 2018-06-27 RX ORDER — HYDROCODONE BITARTRATE AND ACETAMINOPHEN 5; 325 MG/1; MG/1
2 TABLET ORAL AS NEEDED
Status: DISCONTINUED | OUTPATIENT
Start: 2018-06-27 | End: 2018-06-28 | Stop reason: HOSPADM

## 2018-06-27 RX ORDER — CEFAZOLIN SODIUM 1 G/3ML
INJECTION, POWDER, FOR SOLUTION INTRAMUSCULAR; INTRAVENOUS
Status: DISCONTINUED | OUTPATIENT
Start: 2018-06-27 | End: 2018-06-27 | Stop reason: HOSPADM

## 2018-06-27 RX ORDER — HYDROMORPHONE HYDROCHLORIDE 1 MG/ML
0.5 INJECTION, SOLUTION INTRAMUSCULAR; INTRAVENOUS; SUBCUTANEOUS ONCE
Status: COMPLETED | OUTPATIENT
Start: 2018-06-27 | End: 2018-06-28

## 2018-06-27 RX ORDER — MIDAZOLAM HYDROCHLORIDE 1 MG/ML
1 INJECTION INTRAMUSCULAR; INTRAVENOUS EVERY 5 MIN PRN
Status: DISCONTINUED | OUTPATIENT
Start: 2018-06-27 | End: 2018-06-28 | Stop reason: HOSPADM

## 2018-06-27 RX ORDER — ONDANSETRON 2 MG/ML
4 INJECTION INTRAMUSCULAR; INTRAVENOUS AS NEEDED
Status: DISCONTINUED | OUTPATIENT
Start: 2018-06-27 | End: 2018-06-28 | Stop reason: HOSPADM

## 2018-06-27 RX ORDER — HYDROMORPHONE HYDROCHLORIDE 1 MG/ML
INJECTION, SOLUTION INTRAMUSCULAR; INTRAVENOUS; SUBCUTANEOUS
Status: COMPLETED
Start: 2018-06-27 | End: 2018-06-27

## 2018-06-27 RX ORDER — MIDAZOLAM HYDROCHLORIDE 1 MG/ML
INJECTION INTRAMUSCULAR; INTRAVENOUS
Status: COMPLETED
Start: 2018-06-27 | End: 2018-06-27

## 2018-06-27 RX ORDER — ACETAMINOPHEN 500 MG
1000 TABLET ORAL EVERY 6 HOURS PRN
Status: ON HOLD | COMMUNITY
End: 2018-11-27

## 2018-06-27 NOTE — H&P
NEUROSURGERY CLINIC VISIT    Alina Nair  10/12/1955  6/27/2018    No chief complaint on file.         HPI:   Alina Nair is a 60year old male presenting for surg mei.  He is dropping things on a consistent basis.     He complains of low back pain which is a 3-4/10. His back pain gets worse with movement. He does get back tightness at night that does wake him up. He denies any radiculopathy in the legs.   He does COLONOSCOPY;  Surgeon: Kylie Mims MD;  Location: [address] [place] [address] ENDOSCOPY  No date: GASTRIC BYPASS,OBESITY,SB [address]  No date: HERNIA SURGERY  95: KNEE ARTHROSCP R Cuco Ballester[place] 20 7/07: OTHER SURGICAL HISTORY      Comment: FOOT SX  2014: Justice Luis Eduardo deferred.      Upper extremity strength:         Deltoid     Triceps      Biceps           Thumb adduction  Intrinsics    Right          4+        5         5           4+  5  4+   Left          4+        5         4+          4 4+ 4      Lower extremit

## 2018-06-27 NOTE — ANESTHESIA PREPROCEDURE EVALUATION
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 Disp:  Rfl:        Allergies: Bee Venom      Anesthesia Evaluation    Patient summary reviewed.     Anesthetic Complications  (-) history of anesthetic complications         GI/Hep 4.65 06/13/2018   HGB 14.3 06/13/2018   HCT 43.3 06/13/2018   MCV 93.1 06/13/2018   MCH 30.8 06/13/2018   MCHC 33.0 06/13/2018   RDW 14.0 06/13/2018   .0 06/13/2018       Lab Results  Component Value Date    06/13/2018   K 4.4 06/13/2018   CL

## 2018-06-28 ENCOUNTER — APPOINTMENT (OUTPATIENT)
Dept: GENERAL RADIOLOGY | Facility: HOSPITAL | Age: 63
DRG: 472 | End: 2018-06-28
Attending: PHYSICIAN ASSISTANT
Payer: COMMERCIAL

## 2018-06-28 PROBLEM — M50.90 CERVICAL DISC DISORDER: Status: ACTIVE | Noted: 2018-06-28

## 2018-06-28 LAB
BUN BLD-MCNC: 23 MG/DL (ref 8–20)
CALCIUM BLD-MCNC: 8.3 MG/DL (ref 8.3–10.3)
CHLORIDE: 109 MMOL/L (ref 101–111)
CO2: 30 MMOL/L (ref 22–32)
CREAT BLD-MCNC: 0.64 MG/DL (ref 0.7–1.3)
ERYTHROCYTE [DISTWIDTH] IN BLOOD BY AUTOMATED COUNT: 14.3 % (ref 11.5–16)
GLUCOSE BLD-MCNC: 146 MG/DL (ref 70–99)
HAV IGM SER QL: 1.9 MG/DL (ref 1.8–2.5)
HCT VFR BLD AUTO: 39.3 % (ref 37–53)
HGB BLD-MCNC: 13.1 G/DL (ref 13–17)
MCH RBC QN AUTO: 31.4 PG (ref 27–33.2)
MCHC RBC AUTO-ENTMCNC: 33.3 G/DL (ref 31–37)
MCV RBC AUTO: 94.2 FL (ref 80–99)
PLATELET # BLD AUTO: 144 10(3)UL (ref 150–450)
POTASSIUM SERPL-SCNC: 4.8 MMOL/L (ref 3.6–5.1)
RBC # BLD AUTO: 4.17 X10(6)UL (ref 4.3–5.7)
RED CELL DISTRIBUTION WIDTH-SD: 49.7 FL (ref 35.1–46.3)
SODIUM SERPL-SCNC: 144 MMOL/L (ref 136–144)
TROPONIN: <0.046 NG/ML (ref ?–0.05)
WBC # BLD AUTO: 8.7 X10(3) UL (ref 4–13)

## 2018-06-28 PROCEDURE — 99223 1ST HOSP IP/OBS HIGH 75: CPT | Performed by: HOSPITALIST

## 2018-06-28 PROCEDURE — 72040 X-RAY EXAM NECK SPINE 2-3 VW: CPT | Performed by: PHYSICIAN ASSISTANT

## 2018-06-28 PROCEDURE — 5A09357 ASSISTANCE WITH RESPIRATORY VENTILATION, LESS THAN 24 CONSECUTIVE HOURS, CONTINUOUS POSITIVE AIRWAY PRESSURE: ICD-10-PCS | Performed by: NEUROLOGICAL SURGERY

## 2018-06-28 RX ORDER — HYDROMORPHONE HYDROCHLORIDE 1 MG/ML
0.4 INJECTION, SOLUTION INTRAMUSCULAR; INTRAVENOUS; SUBCUTANEOUS EVERY 2 HOUR PRN
Status: DISCONTINUED | OUTPATIENT
Start: 2018-06-28 | End: 2018-07-06

## 2018-06-28 RX ORDER — DIPHENHYDRAMINE HCL 25 MG
25 CAPSULE ORAL EVERY 4 HOURS PRN
Status: DISCONTINUED | OUTPATIENT
Start: 2018-06-28 | End: 2018-07-06

## 2018-06-28 RX ORDER — HYDROMORPHONE HYDROCHLORIDE 1 MG/ML
0.8 INJECTION, SOLUTION INTRAMUSCULAR; INTRAVENOUS; SUBCUTANEOUS EVERY 2 HOUR PRN
Status: DISCONTINUED | OUTPATIENT
Start: 2018-06-28 | End: 2018-07-06

## 2018-06-28 RX ORDER — BISACODYL 10 MG
10 SUPPOSITORY, RECTAL RECTAL
Status: DISCONTINUED | OUTPATIENT
Start: 2018-06-28 | End: 2018-07-06

## 2018-06-28 RX ORDER — METOCLOPRAMIDE HYDROCHLORIDE 5 MG/ML
10 INJECTION INTRAMUSCULAR; INTRAVENOUS EVERY 6 HOURS PRN
Status: DISCONTINUED | OUTPATIENT
Start: 2018-06-28 | End: 2018-07-06

## 2018-06-28 RX ORDER — ACETAMINOPHEN 325 MG/1
650 TABLET ORAL EVERY 4 HOURS PRN
Status: DISCONTINUED | OUTPATIENT
Start: 2018-06-28 | End: 2018-07-06

## 2018-06-28 RX ORDER — DOCUSATE SODIUM 100 MG/1
100 CAPSULE, LIQUID FILLED ORAL 2 TIMES DAILY
Status: DISCONTINUED | OUTPATIENT
Start: 2018-06-28 | End: 2018-07-06

## 2018-06-28 RX ORDER — DIPHENHYDRAMINE HYDROCHLORIDE 50 MG/ML
25 INJECTION INTRAMUSCULAR; INTRAVENOUS EVERY 4 HOURS PRN
Status: DISCONTINUED | OUTPATIENT
Start: 2018-06-28 | End: 2018-07-06

## 2018-06-28 RX ORDER — POLYETHYLENE GLYCOL 3350 17 G/17G
17 POWDER, FOR SOLUTION ORAL DAILY PRN
Status: DISCONTINUED | OUTPATIENT
Start: 2018-06-28 | End: 2018-07-06

## 2018-06-28 RX ORDER — DEXAMETHASONE SODIUM PHOSPHATE 4 MG/ML
2 VIAL (ML) INJECTION EVERY 12 HOURS
Status: DISCONTINUED | OUTPATIENT
Start: 2018-06-28 | End: 2018-06-28

## 2018-06-28 RX ORDER — SENNOSIDES 8.6 MG
17.2 TABLET ORAL NIGHTLY
Status: DISCONTINUED | OUTPATIENT
Start: 2018-06-28 | End: 2018-07-06

## 2018-06-28 RX ORDER — ACETAMINOPHEN 500 MG
1000 TABLET ORAL ONCE
Status: DISCONTINUED | OUTPATIENT
Start: 2018-06-28 | End: 2018-07-06

## 2018-06-28 RX ORDER — HYDROCODONE BITARTRATE AND ACETAMINOPHEN 5; 325 MG/1; MG/1
1 TABLET ORAL EVERY 4 HOURS PRN
Status: DISCONTINUED | OUTPATIENT
Start: 2018-06-28 | End: 2018-07-05 | Stop reason: DRUGHIGH

## 2018-06-28 RX ORDER — DEXAMETHASONE SODIUM PHOSPHATE 4 MG/ML
4 VIAL (ML) INJECTION EVERY 12 HOURS
Status: DISCONTINUED | OUTPATIENT
Start: 2018-06-28 | End: 2018-06-29

## 2018-06-28 RX ORDER — ORPHENADRINE CITRATE 30 MG/ML
30 INJECTION INTRAMUSCULAR; INTRAVENOUS EVERY 6 HOURS
Status: DISCONTINUED | OUTPATIENT
Start: 2018-06-28 | End: 2018-07-06

## 2018-06-28 RX ORDER — SODIUM CHLORIDE 9 MG/ML
INJECTION, SOLUTION INTRAVENOUS CONTINUOUS
Status: DISCONTINUED | OUTPATIENT
Start: 2018-06-28 | End: 2018-07-06

## 2018-06-28 RX ORDER — SODIUM PHOSPHATE, DIBASIC AND SODIUM PHOSPHATE, MONOBASIC 7; 19 G/133ML; G/133ML
1 ENEMA RECTAL ONCE AS NEEDED
Status: DISCONTINUED | OUTPATIENT
Start: 2018-06-28 | End: 2018-07-06

## 2018-06-28 RX ORDER — ONDANSETRON 2 MG/ML
4 INJECTION INTRAMUSCULAR; INTRAVENOUS EVERY 4 HOURS PRN
Status: ACTIVE | OUTPATIENT
Start: 2018-06-28 | End: 2018-06-29

## 2018-06-28 RX ORDER — HYDROMORPHONE HYDROCHLORIDE 1 MG/ML
0.2 INJECTION, SOLUTION INTRAMUSCULAR; INTRAVENOUS; SUBCUTANEOUS EVERY 2 HOUR PRN
Status: DISCONTINUED | OUTPATIENT
Start: 2018-06-28 | End: 2018-07-06

## 2018-06-28 RX ORDER — CEFAZOLIN SODIUM/WATER 2 G/20 ML
2 SYRINGE (ML) INTRAVENOUS EVERY 8 HOURS
Status: COMPLETED | OUTPATIENT
Start: 2018-06-28 | End: 2018-06-28

## 2018-06-28 RX ORDER — HYDROCODONE BITARTRATE AND ACETAMINOPHEN 5; 325 MG/1; MG/1
2 TABLET ORAL EVERY 4 HOURS PRN
Status: DISCONTINUED | OUTPATIENT
Start: 2018-06-28 | End: 2018-07-05 | Stop reason: DRUGHIGH

## 2018-06-28 RX ORDER — DEXAMETHASONE SODIUM PHOSPHATE 4 MG/ML
4 VIAL (ML) INJECTION EVERY 12 HOURS
Status: DISCONTINUED | OUTPATIENT
Start: 2018-06-28 | End: 2018-06-28

## 2018-06-28 RX ORDER — DEXAMETHASONE SODIUM PHOSPHATE 10 MG/ML
4 INJECTION, SOLUTION INTRAMUSCULAR; INTRAVENOUS EVERY 6 HOURS
Status: DISCONTINUED | OUTPATIENT
Start: 2018-06-28 | End: 2018-06-28 | Stop reason: DRUGHIGH

## 2018-06-28 NOTE — OCCUPATIONAL THERAPY NOTE
OCCUPATIONAL THERAPY EVALUATION - INPATIENT     Room Number: 380/380-A  Evaluation Date: 6/28/2018  Type of Evaluation: Initial  Presenting Problem:  (C3-7 ACDF extension of fusion with microdiscectomy)    Physician Order: IP Consult to Moisés Chang CHOLECYSTECTOMY      Comment: Dr. Stefany Haile  5/7/2015: COLONOSCOPY N/A      Comment: Procedure: COLONOSCOPY;  Surgeon: Berta Meyer MD;  Location: [facility] ENDOSCOPY  No date: GASTRIC BYPASS,OBESITY,SB RECONSTRUC  No date: HERNIA SURGERY  95: KNDOMINIK flex 4+/5, elbow ext 3+/5      COORDINATION  Gross Motor    WFL    Fine Motor    WFL      ADDITIONAL TESTS                                    NEUROLOGICAL FINDINGS                   ACTIVITY TOLERANCE   O2 Saturation: 95%  Room air  No shortness of breath admitted on 6/27/2018 for elective ACDF. Complete medical history and occupational profile noted above. Functional outcome measures completed include AMPAC.  In this OT evaluation patient presents with the following performance deficits:decreased balance, training;Equipment eval/education; Compensatory technique education  Rehab Potential : Good  Frequency (Obs): 5x/week  Number of Visits to Meet Established Goals: 1    ADL Goals   Patient will perform grooming: with supervision and while standing at sink  P

## 2018-06-28 NOTE — PAYOR COMM NOTE
--------------  CONTINUED STAY REVIEW    Payor: Francisco Diggs [facility] EPO  Subscriber #:  [id number]  Authorization Number: [id number]    Admit date: 6/28/18  Admit time: 0002    Admitting Physician: Antonia Frankel MD  Attending Physician:  Bryce Olivarez times   OOB, Ambulate  PT/OT   Cont norflex  ST following - swallow eval again tomorrow  Decadron 4 mg IV BID  DVT prophylaxis   hospitalist for medical management            MEDICATIONS ADMINISTERED IN LAST 1 DAY:     CeFAZolin Sodium (ANCEF) 1 g injectio Action Dose Route User    6/28/2018 0651 New Bag (none) Intravenous Ceas, Karan Menchaca, RN        PATIENT REMAINS NPO  NEURO CHECKS Q4H  MOORE IN PLACE    APPROVED TO 6/28 - PATIENT REMAINS INHOUSE - PLEASE PROVIDE ADDITIONAL APPROVED INPATIENT DAYS.  Bebe Dorado

## 2018-06-28 NOTE — PROGRESS NOTES
[facility] Neurosurgical Progress Note    Francesco Brand Patient Status:  Inpatient    10/12/1955 MRN [id number]   [facility] 3SW-A Attending Antonia Frankel MD   Hosp Day # 0 PCP THEO SOSA,          Subjective:  Nighat Dumont medical management     Discussed above with Dr Eric Catherine rounded on patient at ~ [address] [facility]  [place] [address]  6/28/2018  12:58 PM  Spectra [phone]

## 2018-06-28 NOTE — PHYSICAL THERAPY NOTE
PHYSICAL THERAPY EVALUATION - INPATIENT     Room Number: 380/380-A  Evaluation Date: 6/28/2018  Type of Evaluation: Initial  Physician Order: PT Eval and Treat    Presenting Problem: S/p ACDF Extension C3-4,C4-5,C6-7 Microdiscectomy,Interbody fusion Comment: Dr. Sonido Crenshaw  5/7/2015: COLONOSCOPY N/A      Comment: Procedure: COLONOSCOPY;  Surgeon: Fatuma Vargas MD;  Location: [address] [place] [address] ENDOSCOPY  No date: GASTRIC BYPASS,OBESITY,SB RECONSTRUC  No date: HERNIA SURGERY  95: [address] [employer]  7/0 within functional limits     Lower extremity strength is within functional limits     BALANCE  Static Sitting: Good  Dynamic Sitting: Good  Static Standing: Fair +  Dynamic Standing: Fair    ADDITIONAL TESTS  Additional Tests: None feeling chest pain & slightly dizzy. Patient was assisted to sit in chair to relax. DAVID Palafox arrived to examine patient. After few minutes patient stated his chest pain & dizziness are resolved. Patient was transported back to room via chair.  BP was 132/72 and this patient is demonstrating a 41.77% degree of impairment in mobility. Research supports that patients with this level of impairment may benefit from Home PT  Safety evaluation & family assist.      PLAN  PT Treatment Plan: Bed mobility;

## 2018-06-28 NOTE — CONSULTS
GERMAIN HOSPITALIST  [address] [place] [address] Patient Status:  Inpatient    10/12/1955 MRN [id number]   [facility] 3SW-A Attending Mai Garvin MD   Hosp Day # 0 PCP THEO SOSA DO     Reason for consult: med managment    R PLACEMENT      Comment: [place] [employer], magnet response inhibit                therapy, not pacemaker dependent  2012: CHOLECYSTECTOMY      Comment: Dr. Ramos  5/7/2015: COLONOSCOPY N/A      Comment: Procedure: COLONOSCOPY;  Surgeon: Saulo Bolivar, Oral Tab Take 20 mg by mouth daily with food. Disp:  Rfl:    betamethasone dipropionate 0.05 % External Cream Apply 1 Application topically 2 (two) times daily.  Disp: 90 g Rfl: 1   Mexiletine HCl 150 MG Oral Cap Take 1 capsule (150 mg total) by mouth every Psychiatric: Appropriate mood and affect.       Diagnostic Data:      Labs:  Recent Labs   Lab  06/27/18   1155   INR  1.10       No results for input(s): GLU, BUN, CREATSERUM, GFRAA, GFRNAA, CA, ALB, NA, K, CL, CO2, ALKPHO, AST, ALT, BILT, TP in the last

## 2018-06-28 NOTE — PAYOR COMM NOTE
--------------  ADMISSION REVIEW     Payor: [facility]  Subscriber #:  [id number]  Authorization Number: [id number]    Admit date: 6/28/18  Admit time: 0002       Admitting Physician: Anisha Shelby MD  Attending Physician:  Silvana Caal tightness. He has left greater than right shoulder pain. He has left C6 radiculopathy which is a 5-7/10. He has numbness in all 5 fingers. He feels like his left arm and hand are cool constantly.   He complains of weakness in the left arm denies any wea History:  No date: ANGIOGRAM  2009: CARDIAC DEFIBRILLATOR PLACEMENT      Comment: Neo Orellana, magnet response inhibit                therapy, not pacemaker dependent  2012: CHOLECYSTECTOMY      Comment: Dr. Alexander Petersen  5/7/2015: COLONOSCOPY N/A      Co deferred.      Upper extremity strength:         Deltoid     Triceps      Biceps           Thumb adduction  Intrinsics    Right          4+        5         5           4+  5  4+   Left          4+        5         4+          4 4+ 4      Lower extremit [M50.021]      Procedure Performed:   Procedure(s):  ANTERIOR CERVICAL DISCECTOMY AND FUSION EXTENSION. CERVICAL 3-4, CERVICAL 4-5, CERVICAL 6-7.  MICRODISCECTOMY, INTERBODY FUSION WITH PLATE AND ALLOGRAFT SCREWS        MEDICATIONS ADMINISTERED IN LAST 1 DA NaCl infusion     Date Action Dose Route User    6/28/2018 7839 New Bag (none) Intravenous Greg Sharma RN

## 2018-06-28 NOTE — ANESTHESIA POSTPROCEDURE EVALUATION
[address] [facility] Patient Status:  Surgery Admit   Age/Gender 60year old male MRN [id number]   [facility] SURGERY Attending Dulce Altamirano MD   Hosp Day # 0 PCP THEO SOSA, DO       Anesthesia Post-op Note    Proce

## 2018-06-28 NOTE — CM/SW NOTE
Informed by PT that home PT safety eval in recommended. [facility] liaison met with pt (preferred provider for pt's ins). SW informed that pt declined [facility].

## 2018-06-28 NOTE — PLAN OF CARE
Impaired Swallowing    • Minimize aspiration risk Progressing        PAIN - ADULT    • Verbalizes/displays adequate comfort level or patient's stated pain goal Progressing        Patient/Family Goals    • Patient/Family Long Term Goal Progressing    • Shanna

## 2018-06-28 NOTE — BRIEF OP NOTE
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

## 2018-06-28 NOTE — PROGRESS NOTES
POD #1 spine newsletter and swallowing precautions provided to patient. Reviewed indications, side effects of pain medication/narcotics and constipation prevention.  Stressed importance of increased fluids/roughage in diet, continued use stool softeners yana

## 2018-06-28 NOTE — HOME CARE LIAISON
TNL REC'D VERBAL ORDER TO SEE PTNT AND DISCUSS HH. TNL MET WITH PTNT AND SPOUSE AT BEDSIDE TO DISCUSS HH. THEY DECLINED.     Fredy Simpson

## 2018-06-28 NOTE — PLAN OF CARE
Dr. Noemí Alberto paged regarding pt having irregular beats with PVCs and some mild chest discomfort. Pt has hx of irregular HR with ICD in place that is not pacing. Pt is alert with no other symptoms. See Dr. Omid Chan. Awaiting response.     EKG ordered, a

## 2018-06-28 NOTE — PLAN OF CARE
NURSING ADMISSION NOTE      Patient admitted via bed from PACU  Oriented to room. Safety precautions initiated. Bed in low position. Call light in reach.     Failed swallow eval- currently NPO with bedside suction  Perfect serve message sent to Dr Scottie Loco

## 2018-06-28 NOTE — SLP NOTE
ADULT SWALLOWING EVALUATION    ASSESSMENT    ASSESSMENT/OVERALL IMPRESSION:  Orders were received for a bedside swallowing evaluation as patient had an ANTERIOR CERVICAL DISCECTOMY AND FUSION EXTENSION. CERVICAL 3-4, CERVICAL 4-5, CERVICAL 6-7.  2011 [facility] Left arm blood clot 2009   • Hypertrophy of prostate with urinary obstruction and other lower urinary tract symptoms (LUTS) 9/8/2015   • Morbid obesity with BMI of 40.0-44.9, adult [facility])    • Muscle weakness    • Nocturia 9/8/2015   • Nose disorder    • Os Visits to Meet Established Goals: 5  Follow Up Needed: Yes  SLP Follow-up Date: 06/29/18    Thank you for your referral.   If you have any questions, please contact Shelly Pederson

## 2018-06-29 LAB
ATRIAL RATE: 69 BPM
P AXIS: 37 DEGREES
P-R INTERVAL: 186 MS
Q-T INTERVAL: 400 MS
QRS DURATION: 98 MS
QTC CALCULATION (BEZET): 428 MS
R AXIS: 0 DEGREES
T AXIS: 53 DEGREES
VENTRICULAR RATE: 69 BPM

## 2018-06-29 PROCEDURE — 99024 POSTOP FOLLOW-UP VISIT: CPT | Performed by: NEUROLOGICAL SURGERY

## 2018-06-29 PROCEDURE — 99232 SBSQ HOSP IP/OBS MODERATE 35: CPT | Performed by: HOSPITALIST

## 2018-06-29 RX ORDER — DEXAMETHASONE SODIUM PHOSPHATE 4 MG/ML
4 VIAL (ML) INJECTION EVERY 8 HOURS
Status: DISCONTINUED | OUTPATIENT
Start: 2018-06-29 | End: 2018-07-01

## 2018-06-29 NOTE — SLP NOTE
SPEECH DAILY NOTE - INPATIENT    ASSESSMENT & PLAN   ASSESSMENT  Contacted patient at the bedside to assess patient's oropharyngeal swallow with liquids and puree, determine candidacy for further assessment of swallowing, and determine safest po diet.    Pa Goals: 5    Session: 1 of 5    If you have any questions, please contact Jodi Finley

## 2018-06-29 NOTE — PHYSICAL THERAPY NOTE
PHYSICAL THERAPY TREATMENT NOTE - INPATIENT    Room Number: 380/380-A     Session: 1   Number of Visits to Meet Established Goals: 2    Presenting Problem: S/p ACDF Extension C3-4,C4-5,C6-7 Microdiscectomy,Interbody fusion with plate & screws on 25/88/95 Comment: FOOT SX  2014: SHOULDER ARTHROSCOPY Right      Comment: AT [facility] R SHOULDER BICEP TENDON REPAIR,   03/2011: SPINE SURGERY PROCEDURE UNLISTED      Comment: CERVICAL FUSION/HARDWARE  12/15/2014: VENTRAL HERNIA REPAIR N/A      Comment: Procedure: HE STATUS  Gait Assessment   Gait Assistance: Supervision - mostly modified independence  Distance (ft): 400 ft  Assistive Device: None  Pattern: Within Functional Limits  Stoop/Curb Assistance: Supervision -- Mostly modified independence  Comment : Above sco to/from Chair/Wheelchair at assistance level: independent      Goal #3 Patient is able to ambulate 300 feet with assist device: walker - rolling at assistance level: modified independent May use RW for gait safety initially.      Goal #4 Able to manage 8 s

## 2018-06-29 NOTE — OCCUPATIONAL THERAPY NOTE
OCCUPATIONAL THERAPY TREATMENT NOTE - INPATIENT     Room Number: 380/380-A  Session: 1/1  Number of Visits to Meet Established Goals: 1    Presenting Problem:  (C3-7 ACDF extension of fusion with microdiscectomy)    History related to current admission: Ctaa Cabrera MD;  Location: [address] [place] [address] ENDOSCOPY  No date: GASTRIC BYPASS,OBESITY,SB RECONSTRUC  No date: HERNIA SURGERY  95: KNEE ARTHROSCP [facility]  7/07: OTHER SURGICAL HISTORY      Comment: FOOT SX  2014: SHOULDER ARTHROSCOPY Right      Comment: this. He completed functional mobility in [facility] for approx 150 feet with good tolerance, supervision level; reported mild chest pain at end of walk; medical staff aware and EKG negative; resolved after sitting down approx 30 seconds.  Patient repo

## 2018-06-29 NOTE — RESPIRATORY THERAPY NOTE
Pt wears a pressure of 15 at home on CPAP, felt that may be too much whilst in the hospital. Adjusted setting to AutoPAP 10-15. Pt is comfortable with this setting. Will continue to monitor.

## 2018-06-29 NOTE — PLAN OF CARE
Problem: Impaired Swallowing  Goal: Minimize aspiration risk  Interventions:  NPO  Oral Care  Use mouth swabs to moisten mouth and facilitate a swallow      Outcome: Progressing

## 2018-06-29 NOTE — PROGRESS NOTES
GERMAIN HOSPITALIST  Progress Note     Arturo Amador Patient Status:  Inpatient    10/12/1955 MRN [id number]   [facility] 3SW-A Attending Candelario Hughes MD   Hosp Day # 1 PCP Radha Blanc DO     Chief Complaint: follow up medical Oral Nightly   • docusate sodium  100 mg Oral BID   • Orphenadrine Citrate  30 mg Intravenous Q6H       ASSESSMENT / PLAN:     1. Dysphagia  1. Due to recent surgery and neck brace  2. Speech therapy following  2. Cervical myelopathy status post ACDF  1.  [place]

## 2018-06-29 NOTE — OPERATIVE REPORT
[facility]    OPERATIVE REPORT    Patient:  Marily Ram;  YOB: 1955     [facility]:  [id number]; Medical Record Number:  [id number]    Admission Date:  6/27/2018 Operation Date:  6/27/2018    . ..........................     Operating Physic posterior longitudinal ligament and osteophytes at the central canal with a high-speed drill.   Posterior longitudinal ligament was carefully opened to expose the dura and aggressive removal of all osteophyte over the central canal and behind the vertebral lateral aspect of each graft. The platysma was closed with running 3-0 Vicryl suture. The subcutaneous tissue with 3-0 Vicryl suture. The final skin closure was a 4-0 subcuticular running stitch with Steri-Strips and a sterile dressing then applied.   Stalin

## 2018-06-29 NOTE — RESPIRATORY THERAPY NOTE
LEMUEL : EQUIPMENT USE: DAILY SUMMARY                                            SET MODE:                                          USAGE IN HOURS:                                          90% EXP. PRESSURE(EPAP):

## 2018-06-30 ENCOUNTER — APPOINTMENT (OUTPATIENT)
Dept: CT IMAGING | Facility: HOSPITAL | Age: 63
DRG: 472 | End: 2018-06-30
Attending: HOSPITALIST
Payer: COMMERCIAL

## 2018-06-30 LAB
ATRIAL RATE: 59 BPM
BUN BLD-MCNC: 20 MG/DL (ref 8–20)
CALCIUM BLD-MCNC: 8.8 MG/DL (ref 8.3–10.3)
CHLORIDE: 107 MMOL/L (ref 101–111)
CO2: 20 MMOL/L (ref 22–32)
CREAT BLD-MCNC: 0.59 MG/DL (ref 0.7–1.3)
GLUCOSE BLD-MCNC: 134 MG/DL (ref 70–99)
P AXIS: 75 DEGREES
P-R INTERVAL: 216 MS
POTASSIUM SERPL-SCNC: 4.3 MMOL/L (ref 3.6–5.1)
Q-T INTERVAL: 404 MS
QRS DURATION: 104 MS
QTC CALCULATION (BEZET): 457 MS
R AXIS: 25 DEGREES
SODIUM SERPL-SCNC: 141 MMOL/L (ref 136–144)
T AXIS: 50 DEGREES
TROPONIN: <0.046 NG/ML (ref ?–0.05)
VENTRICULAR RATE: 77 BPM

## 2018-06-30 PROCEDURE — 70491 CT SOFT TISSUE NECK W/DYE: CPT | Performed by: HOSPITALIST

## 2018-06-30 PROCEDURE — 71275 CT ANGIOGRAPHY CHEST: CPT | Performed by: HOSPITALIST

## 2018-06-30 PROCEDURE — 99232 SBSQ HOSP IP/OBS MODERATE 35: CPT | Performed by: HOSPITALIST

## 2018-06-30 NOTE — PROGRESS NOTES
GERMAIN HOSPITALIST  Progress Note     Pippa Frank Patient Status:  Inpatient    10/12/1955 MRN [id number]   [facility] 3SW-A Attending Karyl Bence, MD   Hosp Day # 2 PCP Larry Kline DO     Chief Complaint: follow up medical INR  1.10   --        Recent Labs   Lab  06/28/18   1738   GLU  146*   BUN  23*   CREATSERUM  0.64*   GFRAA  120   GFRNAA  104   CA  8.3   NA  144   K  4.8   CL  109   CO2  30.0       Estimated Creatinine Clearance: 129.7 mL/min (A) (based on SCr of 0.64

## 2018-06-30 NOTE — PROGRESS NOTES
Patient stated he had a chest pain at around 4:45 am,when laying down,but did not call. Previous day had the same episode,EKG and TROPONIN was done,and everything was negative. Will order EKG,and troponin. patient appears fine,no discomfort at this time excep

## 2018-06-30 NOTE — PROGRESS NOTES
[facility]  FOUZIA Progress Note    Doll Camera Patient Status:  Inpatient    10/12/1955 MRN [id number]   [facility] 3SW-A Attending Kirstin Mckenzie MD   Hosp Day # 2 PCP THEO SOSA, DO       Subjective: Still with difficult

## 2018-06-30 NOTE — PLAN OF CARE
Problem: Impaired Swallowing  Goal: Minimize aspiration risk  Interventions:  NPO  Provide PO Ice Chips  Oral Care  Use mouth swabs to moisten mouth and facilitate a swallow       Outcome: Progressing

## 2018-06-30 NOTE — RESPIRATORY THERAPY NOTE
LEMUEL - Equipment Use Daily Summary:                  . Set Mode: AUTO CPAP WITH C-FLEX                . Usage in hours: 4:28                . 90% Pressure (EPAP) level: 12.7                . 90% Insp. Pressure (IPAP): Fahad Kern  AHI:6.3

## 2018-06-30 NOTE — PROGRESS NOTES
Neurological Surgery Attending    Nicky Mention    Interval History: His preoperative myelopathic weakness and tingling has resolved. He feels he is swallowing his saliva a little better today but did fail swallowing study.   Speech pathology working wit

## 2018-06-30 NOTE — PLAN OF CARE
Page to Dr. Holly Godfrey office to inform of CTA and CT neck order, will confirm no additional imaging needed and aspen collar for CT neck. Awaiting response. Immediate return call- ok per Dr. Venegas Public for above tests, no additional images needed.  Per

## 2018-06-30 NOTE — SLP NOTE
SPEECH DAILY NOTE - INPATIENT    ASSESSMENT & PLAN   ASSESSMENT  Contacted patient at bedside to determine ability to participate in an evaluation of swallowing. Patient assessed at the bedside with ice chips and thin liquids.  Patient with functional oral 5    Session: 2 of 5    If you have any questions, please contact Sara Sandy

## 2018-06-30 NOTE — PLAN OF CARE
Telephone communication with Dr. Kenton Sutton re reported early AM events of chest pain, per report pain was unchanged from previous work up, resolved in 15 min per report.    EKG from 6/28/18 and EKG from this AM read to Dr. Rosamaria Ellison orders for IV Protonix

## 2018-07-01 PROCEDURE — 99233 SBSQ HOSP IP/OBS HIGH 50: CPT | Performed by: HOSPITALIST

## 2018-07-01 RX ORDER — DEXAMETHASONE SODIUM PHOSPHATE 4 MG/ML
2 VIAL (ML) INJECTION EVERY 8 HOURS
Status: DISCONTINUED | OUTPATIENT
Start: 2018-07-01 | End: 2018-07-02

## 2018-07-01 RX ORDER — IPRATROPIUM BROMIDE AND ALBUTEROL SULFATE 2.5; .5 MG/3ML; MG/3ML
3 SOLUTION RESPIRATORY (INHALATION)
Status: DISCONTINUED | OUTPATIENT
Start: 2018-07-01 | End: 2018-07-05

## 2018-07-01 NOTE — PROGRESS NOTES
[facility]  FOUZIA Progress Note    Sharyle Casey Patient Status:  Inpatient    10/12/1955 MRN [id number]   [facility] 3SW-A Attending Jaden Dos Santos MD   Hosp Day # 3 PCP THEO SOSA, DO       Subjective: Still with difficult

## 2018-07-01 NOTE — PLAN OF CARE
Patient c/o increased sputum and SOB. States hx asthma, PTA inhalers not on [facility]- page top Dr. Neel Rao to inform. Lakhwinder and IS at bedside and used- patient states relief after use. 9205- Dr. Milli Gil and SLP Rebbeca Lesches at bedside.  Orders for inhalers and

## 2018-07-01 NOTE — PROGRESS NOTES
GERMAIN HOSPITALIST  Progress Note     Tammy Yu Patient Status:  Inpatient    10/12/1955 MRN [id number]   [facility] 3SW-A Attending Elvis Nolasco MD   Hosp Day # 3  [facility],      Chief Complaint: Asthma    S: Shanna mg Intravenous Q8H   • acetaminophen  1,000 mg Oral Once   • Senna  17.2 mg Oral Nightly   • docusate sodium  100 mg Oral BID   • Orphenadrine Citrate  30 mg Intravenous Q6H       ASSESSMENT / PLAN:     1.  Dyspnea with secretions, COPD with mild exacerbati

## 2018-07-01 NOTE — SLP NOTE
SPEECH DAILY NOTE - INPATIENT    ASSESSMENT & PLAN   ASSESSMENT  Contacted patient this am with hospitalist, Dr. Lisa Zhao. Patient spitting up mucous. Dr. Lisa Zhao ordered respiratory for breathing treatment and asked for assessment of swallowing following RT. Undetermined    Treatment Plan  Treatment Plan/Recommendations: Videofluoroscopic swallow study    Interdisciplinary Communication: Discussed with RN  Discussed with physician            GOALS  Goal #1 Pt will participate in VFSS to objectively assess swal

## 2018-07-01 NOTE — RESPIRATORY THERAPY NOTE
LEMUEL - Equipment Use Daily Summary:                  . Set Mode: AUTO CPAP WITH C-FLEX                . Usage in hours: 7:18                . 90% Pressure (EPAP) level: 11                . 90% Insp. Pressure (IPAP): Vaishnavi Jeffers AHI: 2.3                .

## 2018-07-02 ENCOUNTER — APPOINTMENT (OUTPATIENT)
Dept: GENERAL RADIOLOGY | Facility: HOSPITAL | Age: 63
DRG: 472 | End: 2018-07-02
Attending: HOSPITALIST
Payer: COMMERCIAL

## 2018-07-02 LAB
BUN BLD-MCNC: 19 MG/DL (ref 8–20)
CALCIUM BLD-MCNC: 8.7 MG/DL (ref 8.3–10.3)
CHLORIDE: 108 MMOL/L (ref 101–111)
CO2: 25 MMOL/L (ref 22–32)
CREAT BLD-MCNC: 0.69 MG/DL (ref 0.7–1.3)
GLUCOSE BLD-MCNC: 102 MG/DL (ref 70–99)
POTASSIUM SERPL-SCNC: 4.3 MMOL/L (ref 3.6–5.1)
SODIUM SERPL-SCNC: 142 MMOL/L (ref 136–144)

## 2018-07-02 PROCEDURE — 99232 SBSQ HOSP IP/OBS MODERATE 35: CPT | Performed by: INTERNAL MEDICINE

## 2018-07-02 PROCEDURE — 74230 X-RAY XM SWLNG FUNCJ C+: CPT | Performed by: HOSPITALIST

## 2018-07-02 RX ORDER — DEXAMETHASONE SODIUM PHOSPHATE 4 MG/ML
2 VIAL (ML) INJECTION EVERY 12 HOURS
Status: DISCONTINUED | OUTPATIENT
Start: 2018-07-02 | End: 2018-07-06

## 2018-07-02 NOTE — RESPIRATORY THERAPY NOTE
LEMUEL Equipment Usage Summary :            Set Mode :AUTO CPAP W FLEX          Usage in Hours:8;50          90% Pressure (EPAP) : 12.4           90% Insp Pressure (IPAP);           AHI : 4.2          Supplemental Oxygen :      LPM

## 2018-07-02 NOTE — PLAN OF CARE
Received phone call from Dr. Richard Otero, ENT. Will see pt tonight. Pt and family informed. Will continue to monitor.

## 2018-07-02 NOTE — PROGRESS NOTES
[facility] Neurosurgical Progress Note    Suzann Cranker Patient Status:  Inpatient    10/12/1955 MRN [id number]   [facility] 3SW-A Attending Nela Mckeon MD   Hosp Day # 4 PCP THEO SOSA,          Subjective:  Erin Neff a mechanical problem -  recs for possible PEG tube however patient is concerned as he had bariatric surgery (8/17 by Dr Victor Hugo Collins) patient would like Dr Josefina Hernandez to talk with Dr Damaris Lamar also recommend ENT consult - he was seen on 6/5 prior to his surgery f

## 2018-07-02 NOTE — PLAN OF CARE
Dr. Arnol Nicholson answering service notified of not returning pages today. Answering service paged again for on call physician.

## 2018-07-02 NOTE — PLAN OF CARE
Dr. Johnson Lopez paged twice today but no return call yet. Anup LAWRENCE notified. Will continue to monitor.

## 2018-07-02 NOTE — PLAN OF CARE
Impaired Swallowing    • Minimize aspiration risk Not Progressing          Impaired Activities of Daily Living    • Achieve highest/safest level of independence in self care Progressing        Impaired Functional Mobility    • Achieve highest/safest level

## 2018-07-02 NOTE — PAYOR COMM NOTE
--------------  CONTINUED STAY REVIEW    Payor: Brandy Marley [facility] EPO  Subscriber #:  [id number]  Authorization Number: [id number]    Admit date: 6/28/18  Admit time: 0002    Admitting Physician: Candice Leon MD  Attending Physician:  Yan Gee up to 7 mm in thickness likely representing postsurgical changes/liquefying hematoma. Superimposed infection cannot be excluded in the   appropriate clinical setting. Complete opacification of the right maxillary sinus.      Subcutaneous gas in the rig 7/2/18      Continue Decadron to facilitate decreased prevertebral swelling. Will follow up on 7/2/18.  Patient encouraged to take ice chips if patient with coughing and throat clearing then discontinue.        Diet Recommendations - Solids: NPO  Diet Recom Umeclidinium Bromide (INCRUSE ELLIPTA) 62.5 MCG/INH inhaler 1 puff     Date Action Dose Route User    7/1/2018 1009 Given 1 puff Inhalation Mag Hansen          AUTHORIZED INPATIENT TO 6/30/18. PLEASE PROVIDE ADDITIONAL APPROVED INPATIENT DAYS.  SAM

## 2018-07-02 NOTE — VIDEO SWALLOW STUDY NOTE
ADULT VIDEOFLUOROSCOPIC SWALLOWING STUDY    Admission Date: 6/27/2018  Evaluation Date: 07/02/18  Radiologist: Dr. Man High: NPO  Diet Recommendations - Liquid: NPO    Further Follow-up:  Follow Up Needed collection of fluid with some punctate gas extending from C3-4 through 0 C6-7 measuring up to 7 mm in thickness likely representing postsurgical changes/liquefying hematoma.  Superimposed infection cannot be excluded in the   appropriate clinical setting. Propulsion (VFSS - Thin Liquids):  Impaired  Triggered at: Valleculae  Premature Spillage to: Valleculae  Delay (seconds): 1  Residue Severity, Location: Severe;Valleculae;Pyriform sinuses  Cleared/Reduced with: Attempted however ineffective  Laryngeal Pene Goals: 3    Thank you for your referral.   If you have any questions, please contact Daniel Jane   Pager [phone]

## 2018-07-02 NOTE — PLAN OF CARE
Impaired Swallowing    • Minimize aspiration risk Not Progressing          PAIN - ADULT    • Verbalizes/displays adequate comfort level or patient's stated pain goal Progressing        Patient/Family Goals    • Patient/Family Long Term Goal Progressing

## 2018-07-02 NOTE — PROGRESS NOTES
[facility]  Progress Note     Bipin Safe Patient Status:  Inpatient    10/12/1955 MRN [id number]   [facility] 3SW-A Attending Luke Gimenez MD   Hosp Day # 4 PCP [address] [place] [address],      Chief Complaint: Asthma    S: Shanna 1,000 mg Oral Once   • Senna  17.2 mg Oral Nightly   • docusate sodium  100 mg Oral BID   • Orphenadrine Citrate  30 mg Intravenous Q6H       ASSESSMENT / PLAN:     1. Dyspnea with secretions, COPD with mild exacerbation, off BD  1. Already on steroids  2.

## 2018-07-03 ENCOUNTER — APPOINTMENT (OUTPATIENT)
Dept: GENERAL RADIOLOGY | Facility: HOSPITAL | Age: 63
DRG: 472 | End: 2018-07-03
Attending: NEUROLOGICAL SURGERY
Payer: COMMERCIAL

## 2018-07-03 LAB
BUN BLD-MCNC: 15 MG/DL (ref 8–20)
CALCIUM BLD-MCNC: 8.6 MG/DL (ref 8.3–10.3)
CHLORIDE: 107 MMOL/L (ref 101–111)
CO2: 26 MMOL/L (ref 22–32)
CREAT BLD-MCNC: 0.6 MG/DL (ref 0.7–1.3)
GLUCOSE BLD-MCNC: 105 MG/DL (ref 70–99)
POTASSIUM SERPL-SCNC: 4.5 MMOL/L (ref 3.6–5.1)
SODIUM SERPL-SCNC: 142 MMOL/L (ref 136–144)

## 2018-07-03 PROCEDURE — 43752 NASAL/OROGASTRIC W/TUBE PLMT: CPT | Performed by: NEUROLOGICAL SURGERY

## 2018-07-03 PROCEDURE — 99232 SBSQ HOSP IP/OBS MODERATE 35: CPT | Performed by: INTERNAL MEDICINE

## 2018-07-03 PROCEDURE — 76000 FLUOROSCOPY <1 HR PHYS/QHP: CPT | Performed by: NEUROLOGICAL SURGERY

## 2018-07-03 NOTE — PROGRESS NOTES
[facility] Neurosurgical Progress Note    Danilo Mc Patient Status:  Inpatient    10/12/1955 MRN [id number]   [facility] 3SW-A Attending Sourav Briceño MD   Hosp Day # 5 PCP [facility],          Subjective:  Amanda Kan prophylaxis  Cervical collar on at all times   Steroids at 2mg BID   PT/OT/ST  Ok from NS standing for dobhoff - nursing reports this will be placed under fluoroscopy   Consult nutritional services - hx gastric bypass  ST following   ENT following       Bruno Edgar

## 2018-07-03 NOTE — PROGRESS NOTES
Dr. Adilene Diaz (ENT) came and seen patient. ENT did Flexible Laryngoscopy at bedside, no complication noted during the procedure. Telemetry called RN for 5-10 beats of slow V-tach, Dr. Chetan Navarro informed.

## 2018-07-03 NOTE — RESPIRATORY THERAPY NOTE
LEMUEL Equipment Usage Summary :            Set Mode :AUTO CPAP W FLEX          Usage in Hours:6;15          90% Pressure (EPAP) : 11.6           90% Insp Pressure (IPAP);           AHI : 3.2          Supplemental Oxygen :      LPM

## 2018-07-03 NOTE — PLAN OF CARE
Patient continue to exhibit gurly wet sound when talking. Failed recent video swallow evaluation and maintained on NPO. Anterior neck incision with steristrips and leaved open to air, no signs of infection noted.   Waiting for ENT to see patient sujey duffy

## 2018-07-03 NOTE — PROGRESS NOTES
Zuhair Chin APN called to update regarding ENT assessment and Dr. Ceasar Arboleda ordered for Dobhoff placement if ok with Dr. Manfred Zuñiga. Waiting for APN to call back regarding Dobhoff placement.     If Dobhoff needs to be place, it will be place under flourosc

## 2018-07-03 NOTE — SLP NOTE
SPEECH DAILY NOTE - INPATIENT    ASSESSMENT & PLAN   ASSESSMENT  Patient seen for dysphagia therapy follow up. Patient alert and sitting up in chair with wife present. Patient reports ENT saw him this morning.   RN reports patient with one paralyzed vocal

## 2018-07-03 NOTE — DIETARY NOTE
[facility]    NUTRITION INITIAL ASSESSMENT    Pt does not meet malnutrition criteria. NUTRITION DIAGNOSIS/PROBLEM:    Inadequate oral intake related to physiological causes as evidenced by need for TF    NUTRITION INTERVENTION:     1.  Enteral Nutri 2. Fluid Accumulation: none.     NUTRITION PRESCRIPTION: Using CBW 83.9 kg  Calories: 4248-1497 calories/day (23-27 calories per kg)  Protein: 100-125 grams protein/day (1.2-1.5 grams protein per kg)  Fluid: ~1 ml/kcal or per MD discretion    MONITOR AND

## 2018-07-03 NOTE — PROGRESS NOTES
Reviewed site care, restrictions, showering and when to contact surgeon for concerns. Discharge instruction video begun for patient and wife. RN informed. Questions solicited and answered.

## 2018-07-03 NOTE — PROGRESS NOTES
[facility]  Progress Note     Doll Camera Patient Status:  Inpatient    10/12/1955 MRN [id number]   [facility] 3SW-A Attending Kirstin Mckenzie MD   Hosp Day # 5 PCP [address] [place] [address],      Chief Complaint: Asthma    S: Shanna Q6H WA   • pantoprazole (PROTONIX) IV push  40 mg Intravenous Q24H   • acetaminophen  1,000 mg Oral Once   • Senna  17.2 mg Oral Nightly   • docusate sodium  100 mg Oral BID   • Orphenadrine Citrate  30 mg Intravenous Q6H       ASSESSMENT / PLAN:     1. Dy

## 2018-07-03 NOTE — PAYOR COMM NOTE
--------------  CONTINUED STAY REVIEW    Payor: Kehinde Kennedy [facility]  Subscriber #:  [id number]  Authorization Number: [id number]    Admit date: 6/28/18  Admit time: 0002    Admitting Physician: Bryan Fox MD  Attending Physician:  Karen Allen 1637 Given 2 mg Intravenous Rusty Anthony RN      Fluticasone Furoate-Vilanterol (BREO ELLIPTA) 200-25 MCG/INH inhaler 1 puff     Date Action Dose Route User    7/3/2018 0900 Given 1 puff Inhalation Jimena Vega RN      HYDROmorphone HCl (DILA

## 2018-07-03 NOTE — CONSULTS
No chief complaint on file. Reason for referral: dysphagia  Referring Physician: Ricki tSevens    HPI on 7/3/2018:  Sharyle Casey is a 60year old male who presents with dysphagia and hoarseness x 1 week.  Patient has had dysphagia and hoarseness since ce HYDROmorphone HCl (DILAUDID) 1 MG/ML injection 0.4 mg 0.4 mg Intravenous Q2H PRN   Or      HYDROmorphone HCl (DILAUDID) 1 MG/ML injection 0.8 mg 0.8 mg Intravenous Q2H PRN   [COMPLETED] ceFAZolin sodium (ANCEF/KEFZOL) 2 GM/20ML premix IV syringe 2 g 2 g impairment     glasses     Past Surgical History:  No date: ANGIOGRAM  2009: CARDIAC DEFIBRILLATOR PLACEMENT      Comment: Neo Orellana, magnet response inhibit                therapy, not pacemaker dependent  2012: CHOLECYSTECTOMY      Comment: Dr. Landrum Merlin and Face:  Normocephalic. Sinuses nontender to palp. Facial strength symmetric/full. Parotid/SMG without mass/lesion. Nose:  External nose nl. Septum midline. Inferior turbs nl. Mucosa nl. Unable to tolerate nasopharynx exam due to gag.    OC/OP:  L prevent a dobhoff tube being placed. I answered all of Favio's questions to the best of my ability and he was satisfied with my answers.     Malcolm Marquez MD  Department of Otolaryngology - Head and Neck Surgery

## 2018-07-03 NOTE — PROGRESS NOTES
Dobhoff tube placed to right notril, procedure done under Flouroscopy by Dr. Lela Dominguez.  Dobhoff placement done without any complications.

## 2018-07-04 PROCEDURE — 99024 POSTOP FOLLOW-UP VISIT: CPT | Performed by: NEUROLOGICAL SURGERY

## 2018-07-04 PROCEDURE — 99232 SBSQ HOSP IP/OBS MODERATE 35: CPT | Performed by: INTERNAL MEDICINE

## 2018-07-04 RX ORDER — NEBIVOLOL 5 MG/1
5 TABLET ORAL EVERY MORNING
Status: DISCONTINUED | OUTPATIENT
Start: 2018-07-04 | End: 2018-07-06

## 2018-07-04 NOTE — PLAN OF CARE
GASTROINTESTINAL - ADULT    • Maintains adequate nutritional intake (undernourished) Progressing        Impaired Activities of Daily Living    • Achieve highest/safest level of independence in self care Progressing        Impaired Functional Mobility    •

## 2018-07-04 NOTE — PROGRESS NOTES
[facility]  Progress Note     Sharyle Casey Patient Status:  Inpatient    10/12/1955 MRN [id number]   [facility] 3SW-A Attending Jaden Dos Santos MD   Hosp Day # 6 PCP [address] [place] [address]     Chief Complaint: Asthma    S: Shanna mg Intravenous Q24H   • acetaminophen  1,000 mg Oral Once   • Senna  17.2 mg Oral Nightly   • docusate sodium  100 mg Oral BID   • Orphenadrine Citrate  30 mg Intravenous Q6H       ASSESSMENT / PLAN:     1.  Dyspnea with secretions, COPD with mild exacerbat

## 2018-07-04 NOTE — RESPIRATORY THERAPY NOTE
LEMEUL : EQUIPMENT USE: DAILY SUMMARY                                            SET MODE: AUTO CPAP WITH CFLEX                                          USAGE IN HOURS: 2:04                                          73

## 2018-07-04 NOTE — PROGRESS NOTES
Patient started on tube feeding with Jevity 1.5 at 20 ml per hours. Water flush at 30 ml/hour. Instructed patient to keep head of the bed elevated at 45 degrees angle to prevent aspiration, he verbalized understanding.   Discussed with patient that tube f

## 2018-07-04 NOTE — CM/SW NOTE
Call from RN that pt can d/c with Dobhoff. SW noted that ins Wai Pour will need to be obtained if agency can be located to accept pt with above tube feeding.     Referral sent via ECIN to Navay re: above (informed that this provider likely in network for pt's

## 2018-07-04 NOTE — PROGRESS NOTES
Neurological Surgery Attending    Elif Simmons    Interval History: He feels he is swallowing a little bit better today. Feeding tube placed without difficulty yesterday through his nose.   Discharge held up today because of home health care planning is

## 2018-07-05 PROCEDURE — 99232 SBSQ HOSP IP/OBS MODERATE 35: CPT | Performed by: INTERNAL MEDICINE

## 2018-07-05 PROCEDURE — 99024 POSTOP FOLLOW-UP VISIT: CPT | Performed by: PHYSICIAN ASSISTANT

## 2018-07-05 RX ORDER — HYDROCODONE BITARTRATE AND ACETAMINOPHEN 5; 325 MG/1; MG/1
1 TABLET ORAL EVERY 6 HOURS PRN
Qty: 20 TABLET | Refills: 0 | Status: SHIPPED | OUTPATIENT
Start: 2018-07-05 | End: 2018-07-11

## 2018-07-05 RX ORDER — IPRATROPIUM BROMIDE AND ALBUTEROL SULFATE 2.5; .5 MG/3ML; MG/3ML
3 SOLUTION RESPIRATORY (INHALATION) EVERY 6 HOURS PRN
Status: DISCONTINUED | OUTPATIENT
Start: 2018-07-05 | End: 2018-07-06

## 2018-07-05 RX ORDER — HYDROCODONE BITARTRATE AND ACETAMINOPHEN 5; 325 MG/1; MG/1
1 TABLET ORAL EVERY 6 HOURS PRN
Status: DISCONTINUED | OUTPATIENT
Start: 2018-07-05 | End: 2018-07-06

## 2018-07-05 NOTE — PROGRESS NOTES
[facility] Neurosurgical Progress Note    Gena Mercado Patient Status:  Inpatient    10/12/1955 MRN [id number]   [facility] 3SW-A Attending Huber President, MD   Hosp Day # 7 PCP [facility],          Subjective:  Kallie Benson [place]  7/5/2018  11:21 AM    Dr. Nima Caballero rounded at 1:20pm.  Recommend repeat speech/swallow evaluation on Monday, 7/9/2018.

## 2018-07-05 NOTE — DIETARY NOTE
Enteral Nutrition Short Note    Continue Jevity 1.5 at goal rate of 50 ml/hour x 24 hours. Recommend 1 packet Pro-Stat Max q 12 hrs (with 30 ml water flush). Recommend 30 ml water flush every hour.  This will provide 1960 kcal, 99 grams protein, 1632 ml tot

## 2018-07-05 NOTE — PROGRESS NOTES
[facility]  Progress Note     Tammy Yu Patient Status:  Inpatient    10/12/1955 MRN [id number]   [facility] 3SW-A Attending Elvis Nolasco MD   Hosp Day # 7 PCP [address] [place] [address],      Chief Complaint: Asthma    S: Shanna BID   • Orphenadrine Citrate  30 mg Intravenous Q6H       ASSESSMENT / PLAN:     1. Dyspnea with secretions, COPD with mild exacerbation, off BD  1. On steroids  2. Start Breo, Incruse  3. Nebs q 6 while awake  4. ENT following  2. Dysphagia  1.  Speech fol

## 2018-07-05 NOTE — PLAN OF CARE
Problem: Impaired Swallowing  Goal: Minimize aspiration risk  Interventions:  NPO  Oral Care  Use mouth swabs to moisten mouth and facilitate a swallow         Outcome: Adequate for Discharge

## 2018-07-05 NOTE — CM/SW NOTE
Spoke with pt and wife re: home NG tube feedings. They agree with plan and hope to d/c home today. Dietary here and confirms pt will need pump at home and continue with same feedings.   Order form for Navya signed by MD and faxed to [facility] [place] per request.  She c

## 2018-07-05 NOTE — PLAN OF CARE
Impaired Swallowing    • Minimize aspiration risk Progressing        Patient/Family Goals    • Patient/Family Long Term Goal Progressing    • Patient/Family Short Term Goal Progressing        Pt tolerating tube feedings. Ambulates frequently in halls.   Te

## 2018-07-05 NOTE — PLAN OF CARE
GASTROINTESTINAL - ADULT    • Maintains adequate nutritional intake (undernourished) Progressing        Impaired Swallowing    • Minimize aspiration risk Progressing        PAIN - ADULT    • Verbalizes/displays adequate comfort level or patient's stated pa

## 2018-07-05 NOTE — HOME CARE LIAISON
TNL SPOKE WITH ZEHRA SANTANA RE PTNT SOC. AFTER TIHIS CHRISTIAN SPOKE WITH MONIK REGARDING DELIVERY TNL CONTACTED OUR SCHEDULING DEPARTMENT FOR A 9AM [facility] 7/6.  PRIMITIVO AT [address] [place] [address] TO CONTACT SPOUSE TO ARRANGE FOR DELIVERY ROSA 7/5     THANKS  Anisha Noguera

## 2018-07-05 NOTE — PAYOR COMM NOTE
--------------  CONTINUED STAY REVIEW    Payor: [employer] EPO  Subscriber #:  [id number]  Authorization Number: [id number]    Admit date: 6/28/18  Admit time: 0002    Admitting Physician: Huber Flynn MD  Attending Physician:  Pankaj Lopez Given 30 mg Intravenous Karri Breen RN    7/4/2018 1956 Given 30 mg Intravenous Karri Breen RN    7/4/2018 1345 Given 30 mg Intravenous Oneal Dubin, RN      Pantoprazole Sodium (PROTONIX) 40 mg in Sodium Chloride 0.9 % 10 mL IV push     D

## 2018-07-05 NOTE — RESPIRATORY THERAPY NOTE
LEMUEL - Equipment Use Daily Summary:  · Set Mode CFLEX  · Usage in hours: 6:00  · 90% Pressure (EPAP) level: 11.5  · 90% Insp Pressure (IPAP):   · AHI: 3.0  · Supplemental Oxygen:   · Comments:

## 2018-07-05 NOTE — SLP NOTE
SPEECH DAILY NOTE - INPATIENT    ASSESSMENT & PLAN   ASSESSMENT  Patient seen to follow up with dysphagia therapy exercises. He is alert and up in bedside chair. 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   To my ear, h

## 2018-07-06 ENCOUNTER — TELEPHONE (OUTPATIENT)
Dept: SURGERY | Facility: CLINIC | Age: 63
End: 2018-07-06

## 2018-07-06 ENCOUNTER — TELEPHONE (OUTPATIENT)
Dept: FAMILY MEDICINE CLINIC | Facility: CLINIC | Age: 63
End: 2018-07-06

## 2018-07-06 VITALS
TEMPERATURE: 98 F | RESPIRATION RATE: 18 BRPM | SYSTOLIC BLOOD PRESSURE: 120 MMHG | WEIGHT: 184.94 LBS | BODY MASS INDEX: 25.05 KG/M2 | HEART RATE: 67 BPM | DIASTOLIC BLOOD PRESSURE: 70 MMHG | OXYGEN SATURATION: 97 % | HEIGHT: 72 IN

## 2018-07-06 DIAGNOSIS — Z98.1 S/P CERVICAL SPINAL FUSION: Primary | ICD-10-CM

## 2018-07-06 NOTE — TELEPHONE ENCOUNTER
Spoke with TIERRA Viramontes who was able to place swallow study order. Patient's wife Beatris Cisse and informed her that swallow study order has been placed. No further questions at this time. Wife appreciative.

## 2018-07-06 NOTE — PLAN OF CARE
Noted some leaking around the connection between feeding tube and the tubing ,tried different connections ,called [facility] RN to check it out ,working fine now ,will continue to monitor .

## 2018-07-06 NOTE — CM/SW NOTE
07/06/18 0900   Discharge disposition   Expected discharge disposition Home-Health   Name of Facillity/Home Care/Hospice Residential   HME provider ([facility] for enteral feeds and supplies)   Discharge transportation Private car

## 2018-07-06 NOTE — PAYOR COMM NOTE
--------------  DISCHARGE REVIEW    Payor: Neil [employer]  Subscriber #:  [id number]  Authorization Number: [id number]    Admit date: 6/28/18  Admit time:  0002  Discharge Date: 7/6/2018  8:00 AM     Admitting Physician: Elvis Nolasco MD  Atte

## 2018-07-06 NOTE — PROGRESS NOTES
As per Dr. Devendra Sosa, patient can continue Mexilitine at home.  AVS updated, patient and wife both were informed    0800:    NURSING DISCHARGE NOTE    Discharged via wheelchair  Accompanied by wife  Belongings all sent with the patient  Rx for Norco given

## 2018-07-06 NOTE — HOME CARE LIAISON
MET WITH PTNT TO DISCUSS HOME HEALTH SERVICES AND COVERAGE CRITERIA. PTNT AGREEABLE TO [facility]. PTNT GIVEN RESIDENTIAL BROCHURE. RESIDENTIAL WITH PROVIDE SN ON DISCHARGE.     Thank you for this referral,   Fahad Mills

## 2018-07-06 NOTE — TELEPHONE ENCOUNTER
Deja Huang discharged from hospital today. In D/C notes it indicates that Dr. Riki Castro wants patient to have another video swallow study done on Monday 7/9/2018 however when I went to schedule no order in system.   Please call me once order has been added i

## 2018-07-06 NOTE — TELEPHONE ENCOUNTER
Call from bruce RN/residential home health-[facility] calling dr davis for order to initiate skilled nursing services. Advised dr davis is out of the ofc for the day/returns 7/9/18, will forward request and call back 7/9/18.   Bruce voices understanding, agrees w aparna

## 2018-07-06 NOTE — PLAN OF CARE
GASTROINTESTINAL - ADULT    • Maintains adequate nutritional intake (undernourished) Adequate for Discharge        Impaired Activities of Daily Living    • Achieve highest/safest level of independence in self care Adequate for Discharge        Impaired Fun

## 2018-07-06 NOTE — PAYOR COMM NOTE
--------------  CONTINUED STAY REVIEW    Payor: Disha [employer]  Subscriber #:  [id number]  Authorization Number: [id number]    Admit date: 6/28/18  Admit time: 0002    Admitting Physician: Karyl Bence, MD    Primary Care Physician: Diego Jalloh

## 2018-07-08 ENCOUNTER — HOSPITAL ENCOUNTER (EMERGENCY)
Facility: HOSPITAL | Age: 63
Discharge: HOME | End: 2018-07-08
Attending: EMERGENCY MEDICINE
Payer: COMMERCIAL

## 2018-07-08 ENCOUNTER — APPOINTMENT (OUTPATIENT)
Dept: GENERAL RADIOLOGY | Facility: HOSPITAL | Age: 63
End: 2018-07-08
Attending: EMERGENCY MEDICINE
Payer: COMMERCIAL

## 2018-07-08 VITALS
HEART RATE: 75 BPM | HEIGHT: 72 IN | BODY MASS INDEX: 23.84 KG/M2 | TEMPERATURE: 98 F | RESPIRATION RATE: 16 BRPM | DIASTOLIC BLOOD PRESSURE: 79 MMHG | SYSTOLIC BLOOD PRESSURE: 111 MMHG | OXYGEN SATURATION: 96 % | WEIGHT: 176 LBS

## 2018-07-08 DIAGNOSIS — Z46.59 ENCOUNTER FOR CARE RELATED TO FEEDING TUBE: Primary | ICD-10-CM

## 2018-07-08 PROCEDURE — 99284 EMERGENCY DEPT VISIT MOD MDM: CPT

## 2018-07-08 PROCEDURE — 93005 ELECTROCARDIOGRAM TRACING: CPT

## 2018-07-08 PROCEDURE — 71045 X-RAY EXAM CHEST 1 VIEW: CPT | Performed by: EMERGENCY MEDICINE

## 2018-07-08 PROCEDURE — 94640 AIRWAY INHALATION TREATMENT: CPT

## 2018-07-08 PROCEDURE — 93010 ELECTROCARDIOGRAM REPORT: CPT

## 2018-07-08 RX ORDER — IPRATROPIUM BROMIDE AND ALBUTEROL SULFATE 2.5; .5 MG/3ML; MG/3ML
3 SOLUTION RESPIRATORY (INHALATION) ONCE
Status: COMPLETED | OUTPATIENT
Start: 2018-07-08 | End: 2018-07-08

## 2018-07-08 RX ORDER — ALBUTEROL SULFATE 2.5 MG/3ML
2.5 SOLUTION RESPIRATORY (INHALATION) EVERY 4 HOURS PRN
Qty: 30 AMPULE | Refills: 0 | Status: SHIPPED | OUTPATIENT
Start: 2018-07-08 | End: 2018-08-07

## 2018-07-08 NOTE — ED NOTES
10F NGT right nostril at 56cm level. Placement verification by stethoscope. Gurgling in left upper quadrant.

## 2018-07-08 NOTE — ED PROVIDER NOTES
Patient Seen in: [facility] Emergency Department    History   Patient presents with:  Cath Tube Problem (gastrointestinal, urinary, integumentary)  Swallowing Problem (gastrointestinal)  Cough/URI    Stated Complaint: cough, swallowing issue, [facility]  5/7/2015: COLONOSCOPY N/A      Comment: Procedure: COLONOSCOPY;  Surgeon: Gatito Hemphill MD;  Location: [address] [place] [address] ENDOSCOPY  No date: GASTRIC BYPASS,OBESITY,SB RECONSTRUC  No date: HERNIA SURGERY  95: [employer]  7/07: OTHER SURG without rashes or lesions. Neuro exam: Awake and moving all 4 extremities well. ED Course   Labs Reviewed - No data to display  EKG    Rate, intervals and axes as noted on EKG Report. Rate: 68's  Rhythm: Sinus Rhythm  Reading: Frequent PVCs.   Left axi needed        Medications Prescribed:  Current Discharge Medication List

## 2018-07-08 NOTE — ED INITIAL ASSESSMENT (HPI)
Pt here for SOB, cough and check for placement of NGT. SOB x 2 days. Pt also concerned for possible pulling out of NGT. NGT is at 56 cm level in right nostril.

## 2018-07-09 ENCOUNTER — PATIENT OUTREACH (OUTPATIENT)
Dept: CASE MANAGEMENT | Age: 63
End: 2018-07-09

## 2018-07-09 ENCOUNTER — PATIENT MESSAGE (OUTPATIENT)
Dept: CASE MANAGEMENT | Age: 63
End: 2018-07-09

## 2018-07-09 DIAGNOSIS — M50.90 CERVICAL DISC DISORDER: ICD-10-CM

## 2018-07-09 DIAGNOSIS — M47.12 CERVICAL SPONDYLOSIS WITH MYELOPATHY: ICD-10-CM

## 2018-07-09 DIAGNOSIS — R13.10 DYSPHAGIA, UNSPECIFIED TYPE: ICD-10-CM

## 2018-07-09 LAB
ATRIAL RATE: 68 BPM
P AXIS: 31 DEGREES
P-R INTERVAL: 170 MS
Q-T INTERVAL: 398 MS
QRS DURATION: 90 MS
QTC CALCULATION (BEZET): 423 MS
R AXIS: -33 DEGREES
T AXIS: 52 DEGREES
VENTRICULAR RATE: 68 BPM

## 2018-07-09 NOTE — DISCHARGE SUMMARY
[facility]  Discharge Summary    Angelique Rodrigez Patient Status:  Inpatient    10/12/1955 MRN [id number]   [facility] 3SW-A Attending No att. providers found   Hosp Day # 8 PCP [facility] DO     Date of Admission: 2018 Arnold. 9]S/P cervical spinal fusion [S58. 1]Weakness of both lower extremities [R29.898]Weakness of both upper extremities [R29.898]Cervical disc disorder at C4-C5 level with myelopathy [M50.021]    Reason for Admission: Reexploration anterior cervical discec food., Historical    betamethasone dipropionate 0.05 % External Cream  Apply 1 Application topically 2 (two) times daily. , Normal, Disp-90 g, R-1    Multiple Vitamins-Minerals (MULTIVITAMIN OR)  Take by mouth daily.   , Historical    Tiotropium Bromide Mono

## 2018-07-10 ENCOUNTER — TELEPHONE (OUTPATIENT)
Dept: FAMILY MEDICINE CLINIC | Facility: CLINIC | Age: 63
End: 2018-07-10

## 2018-07-10 ENCOUNTER — TELEPHONE (OUTPATIENT)
Dept: NEUROLOGY | Facility: CLINIC | Age: 63
End: 2018-07-10

## 2018-07-10 ENCOUNTER — HOSPITAL ENCOUNTER (EMERGENCY)
Facility: HOSPITAL | Age: 63
Discharge: HOME | End: 2018-07-11
Payer: COMMERCIAL

## 2018-07-10 ENCOUNTER — HOSPITAL ENCOUNTER (OUTPATIENT)
Dept: GENERAL RADIOLOGY | Facility: HOSPITAL | Age: 63
Discharge: HOME | End: 2018-07-10
Attending: PHYSICIAN ASSISTANT
Payer: COMMERCIAL

## 2018-07-10 DIAGNOSIS — Z46.59 ENCOUNTER FOR FEEDING TUBE PLACEMENT: Primary | ICD-10-CM

## 2018-07-10 DIAGNOSIS — Z98.1 STATUS POST CERVICAL SPINAL FUSION: ICD-10-CM

## 2018-07-10 PROCEDURE — 74230 X-RAY XM SWLNG FUNCJ C+: CPT | Performed by: PHYSICIAN ASSISTANT

## 2018-07-10 PROCEDURE — 92611 MOTION FLUOROSCOPY/SWALLOW: CPT

## 2018-07-10 PROCEDURE — 99283 EMERGENCY DEPT VISIT LOW MDM: CPT

## 2018-07-10 RX ORDER — MEXILETINE HYDROCHLORIDE 150 MG/1
150 CAPSULE ORAL 2 TIMES DAILY
COMMUNITY

## 2018-07-10 NOTE — TELEPHONE ENCOUNTER
Patient discharged from [facility] on 7/6/18 and is at Moderated risk or readmission and recommended to have a TCM HFU by 7/20/18 or sooner. [facility] attempted to schedule TCM HFU Janet, Favio's wife, states she will call to schedule the appointment.     Ple

## 2018-07-10 NOTE — PROGRESS NOTES
Initial Post Discharge Follow Up   Discharge Date: 7/8/18  Contact Date: 7/9/2018    Consent Verification:  Assessment Completed With: Spouse: Jocelyn Kwon received per patient?  written  HIPAA Verified?   Yes    Discharge Dx:    Neurologic gait dysf well were your discharge instructions explained to you?   o On a scale of 1-5   1- Very Poor and 5- Very well   o Very well  • Do you have any questions about your discharge instructions?  No  • Before leaving the hospital was your diagnoses explained to yo (DULERA) 200-5 MCG/ACT Inhalation Aerosol Inhale 2 puffs into the lungs 2 (two) times daily. Disp:  Rfl:    Multiple Vitamins-Minerals (MULTIVITAMIN OR) Take by mouth daily.    Disp:  Rfl:    Tiotropium Bromide Monohydrate (SPIRIVA HANDIHALER) 18 MCG Inhala [address] ([address])    Aug 07, 2018 11:00 AM CDT RD Follow Up Post Op with Niko Alva, [address] [facility], [address] [facility])    Aug 14, 2018 10:1 upcoming Specialist Appt with patient     Yes, Deborah Buddy states that Marce Dsouza has a follow up with [place] [facility] for Dr. Marcell Herndon tomorrow 7/11/18.         Interventions by NCM:  NCM reviewed medications, discharge instructions, S&S of infection/blood clots, when

## 2018-07-10 NOTE — PROGRESS NOTES
ADULT VIDEOFLUOROSCOPIC SWALLOWING STUDY    Admission Date: 7/10/2018  Evaluation Date: 07/10/18  Radiologist: Dr. Siva Snider: NPO  Diet Recommendations - Liquid: NPO    Further Follow-up:              Medic perceived improvement in his vocal quality and reported a small amount of perceived improvement in swallow function. He does report feeling his swallow is still \"incomplete\". He has been performing dysphagia therapy exercises as trained.       Family/Pa improving base of tongue retraction and improving pharyngeal squeeze as well as hyolaryngeal excursion. If cleared by neurosurgery, consideration of neuromuscular stimulation for dysphagia is suggested.   Location of patient's incision may impact placement

## 2018-07-11 ENCOUNTER — OFFICE VISIT (OUTPATIENT)
Dept: SURGERY | Facility: CLINIC | Age: 63
End: 2018-07-11

## 2018-07-11 ENCOUNTER — TELEPHONE (OUTPATIENT)
Dept: SURGERY | Facility: CLINIC | Age: 63
End: 2018-07-11

## 2018-07-11 ENCOUNTER — APPOINTMENT (OUTPATIENT)
Dept: GENERAL RADIOLOGY | Facility: HOSPITAL | Age: 63
End: 2018-07-11
Payer: COMMERCIAL

## 2018-07-11 VITALS — SYSTOLIC BLOOD PRESSURE: 120 MMHG | DIASTOLIC BLOOD PRESSURE: 60 MMHG | HEART RATE: 76 BPM

## 2018-07-11 VITALS
OXYGEN SATURATION: 98 % | HEART RATE: 65 BPM | RESPIRATION RATE: 18 BRPM | BODY MASS INDEX: 23.3 KG/M2 | HEIGHT: 72 IN | DIASTOLIC BLOOD PRESSURE: 80 MMHG | TEMPERATURE: 97 F | SYSTOLIC BLOOD PRESSURE: 130 MMHG | WEIGHT: 172 LBS

## 2018-07-11 DIAGNOSIS — R29.898 WEAKNESS OF BOTH LOWER EXTREMITIES: ICD-10-CM

## 2018-07-11 DIAGNOSIS — Z98.1 S/P CERVICAL SPINAL FUSION: ICD-10-CM

## 2018-07-11 DIAGNOSIS — R13.10 DYSPHAGIA, UNSPECIFIED TYPE: ICD-10-CM

## 2018-07-11 DIAGNOSIS — M50.00 CERVICAL DISC DISORDER WITH MYELOPATHY: Primary | ICD-10-CM

## 2018-07-11 DIAGNOSIS — M50.00 CERVICAL DISC DISORDER WITH MYELOPATHY: ICD-10-CM

## 2018-07-11 DIAGNOSIS — R13.10 DYSPHAGIA, UNSPECIFIED TYPE: Primary | ICD-10-CM

## 2018-07-11 DIAGNOSIS — R29.898 WEAKNESS OF BOTH UPPER EXTREMITIES: ICD-10-CM

## 2018-07-11 PROCEDURE — 71045 X-RAY EXAM CHEST 1 VIEW: CPT

## 2018-07-11 PROCEDURE — 99024 POSTOP FOLLOW-UP VISIT: CPT | Performed by: PHYSICIAN ASSISTANT

## 2018-07-11 PROCEDURE — 74018 RADEX ABDOMEN 1 VIEW: CPT

## 2018-07-11 RX ORDER — HYDROCODONE BITARTRATE AND ACETAMINOPHEN 5; 325 MG/1; MG/1
1 TABLET ORAL EVERY 6 HOURS PRN
Qty: 60 TABLET | Refills: 0 | Status: SHIPPED | OUTPATIENT
Start: 2018-07-11 | End: 2018-08-23 | Stop reason: ALTCHOICE

## 2018-07-11 RX ORDER — MOMETASONE FUROATE 220 UG/1
2 INHALANT RESPIRATORY (INHALATION) 2 TIMES DAILY
COMMUNITY

## 2018-07-11 NOTE — PATIENT INSTRUCTIONS
Refill policies:    • Allow 2-3 business days for refills; controlled substances may take longer.   • Contact your pharmacy at least 5 days prior to running out of medication and have them send an electronic request or submit request through the “request re entire amount billed. Precertification and Prior Authorizations: If your physician has recommended that you have a procedure or additional testing performed.   [facility]) will contact your insurance carrier to obtain pre-certi

## 2018-07-11 NOTE — ED PROVIDER NOTES
Patient Seen in: [facility] Emergency Department    History   Patient presents with:  Cath Tube Problem (gastrointestinal, urinary, integumentary)    Stated Complaint: clogged ng tube    HPI    Patient here because the Dobbhoff which he gets continu ENDOSCOPY  No date: GASTRIC BYPASS,OBESITY,SB RECONSTRUC  No date: HERNIA SURGERY  95: KNEE ARTHROSCP [facility]  7/07: OTHER SURGICAL HISTORY      Comment: FOOT SX  2014: SHOULDER ARTHROSCOPY Right      Comment: AT [facility] R SHOULDER BICEP TENDON REPAIR,   03/2011: (cpt=70491)    Result Date: 6/30/2018  PROCEDURE:  CT SOFT TISSUE OF NECK(CONTRAST ONLY) (CPT=70491)  COMPARISON:  GERMAIN , CT SPINE CERVICAL LUMBAR FOR MYELOGRAM (IV)(CPT=72126/75506), 5/10/2018, 9:07.   INDICATIONS:  dysphagia s/p ACDF  TECHNIQUE:  IV con levels. There is a thin prevertebral collection of fluid with some punctate gas extending from C3-4 through 0 C6-7 measuring up to 7 mm in thickness likely representing postsurgical changes/liquefying hematoma.   Superimposed infection cannot be exclude discectomy and fusion recently and this is felt to be related to previous surgery. Numerous enhancing venous collaterals in the right proximal upper extremity, right chest wall, patient had right arm injection.   There is stenosis with mass effect and com Extensive (cpt=76001)    Result Date: 6/27/2018  PROCEDURE:  XR FLUOROSCOPE EXAM > 1 HR EXTENSIVE (CPT=76001)  INDICATIONS: OR  COMPARISON:  None.    TECHNIQUE:  FLUOROSCOPY IMAGES OBTAINED:  3 FLUOROSCOPY TIME:  65.7 sec TECHNOLOGIST TIME:  7 hours DELANEY hoarseness and difficulties with swallowing food and liquids since his ACDF of c3-c7 last Wednesday. CINE CAPTURES:  3 FLUORSCOPY TIME:  1 minute 19 seconds RADIATION DOSE (AIR KERMA PRODUCT):  111.1 uGy/m2   FINDINGS:  Aspiration with thin liquids.   Lar As a treating physician attending to the patient, I determined, within reasonable clinical confidence and prior to discharge, that an emergency medical condition was not or was no longer present.   There was no indication for further evaluation, treatment

## 2018-07-11 NOTE — TELEPHONE ENCOUNTER
Notes to be faxed to [facility] at [phone], christian     Notes faxed, nothing further.

## 2018-07-11 NOTE — PROGRESS NOTES
[facility] Neurosurgery eval      HISTORY OF PRESENT ILLNESS:Favio Mercado is a 60year old Rh male in follow-up after having fusion extension C3-4 C4-5 and C6-7 by Dr. Stuart Perez 6/29/18.     Patient has postop difficulty with swallowing and speech she was diagnos denies any radiculopathy in the legs. He does complain of stiffness in the legs and achiness in the anterior hips. He denies any tripping or falling he does have trouble on uneven surfaces and has trouble in the dark.   He denies any bowel bladder inconti ARTHROSCP HARV  06/27/2018: OTHER      Comment: ANTERIOR CERVICAL DISCECTOMY AND FUSION                EXTENSION.  CERVICAL 3-4, CERVICAL 4-5, CERVICAL               6-7. MICRODISCECTOMY, INTERBODY FUSION WITH                PLATE AND ALLOGRAFT SCREWS N/A Tab Take by mouth daily. Disp:  Rfl:    Montelukast Sodium (SINGULAIR) 10 MG Oral Tab Take 10 mg by mouth nightly. Disp:  Rfl:    Rivaroxaban (XARELTO) 20 MG Oral Tab Take 20 mg by mouth daily with food.  Disp:  Rfl:    betamethasone dipropionate 0.05 % E 5       5         5 5 5     Reflexes DTRs: 1/4 in upper and lower extremities    IMAGING:  CT of the abdomen spinal views show L4-5 spinal stenosis    CT myelogram of the cervical spine from 2010 shows mild spondylosis with mild stenosis at C3-4 C4-5

## 2018-07-12 ENCOUNTER — MYAURORA ACCOUNT LINK (OUTPATIENT)
Dept: OTHER | Age: 63
End: 2018-07-12

## 2018-07-13 ENCOUNTER — OFFICE VISIT (OUTPATIENT)
Dept: FAMILY MEDICINE CLINIC | Facility: CLINIC | Age: 63
End: 2018-07-13

## 2018-07-13 ENCOUNTER — TELEPHONE (OUTPATIENT)
Dept: NEUROLOGY | Facility: CLINIC | Age: 63
End: 2018-07-13

## 2018-07-13 VITALS
TEMPERATURE: 97 F | WEIGHT: 168 LBS | SYSTOLIC BLOOD PRESSURE: 110 MMHG | HEART RATE: 46 BPM | DIASTOLIC BLOOD PRESSURE: 70 MMHG | OXYGEN SATURATION: 97 % | BODY MASS INDEX: 23 KG/M2

## 2018-07-13 DIAGNOSIS — J38.00 VOCAL CORD PARESIS: ICD-10-CM

## 2018-07-13 DIAGNOSIS — R13.13 PHARYNGEAL DYSPHAGIA: ICD-10-CM

## 2018-07-13 DIAGNOSIS — M50.021 CERVICAL DISC DISORDER AT C4-C5 LEVEL WITH MYELOPATHY: ICD-10-CM

## 2018-07-13 DIAGNOSIS — I10 ESSENTIAL HYPERTENSION WITH GOAL BLOOD PRESSURE LESS THAN 130/80: Primary | ICD-10-CM

## 2018-07-13 DIAGNOSIS — Z98.1 STATUS POST CERVICAL SPINAL FUSION: ICD-10-CM

## 2018-07-13 PROCEDURE — 1111F DSCHRG MED/CURRENT MED MERGE: CPT | Performed by: FAMILY MEDICINE

## 2018-07-13 PROCEDURE — 99495 TRANSJ CARE MGMT MOD F2F 14D: CPT | Performed by: FAMILY MEDICINE

## 2018-07-13 NOTE — TELEPHONE ENCOUNTER
Received fax from [phone] stating PA was needed for Norco.    PA initiated on cover my meds. PA pending.

## 2018-07-13 NOTE — PROGRESS NOTES
HPI:    Weston Elizondo is a 60year old male here today for hospital follow up.    He was discharged from Inpatient hospital, [facility] to Home   Admit Date: 6/27/18  Discharge Date: 7/6/18  Hospital Discharge Diagnosis:  Cervical disc disorder at result. He still continues to have this NG tube in place. He feels that his neck pain has gotten better but he still having difficulty with the swallowing aspect and hoarseness in his voice. He denies any fever.   He has a follow-up appointment in 2 week Take by mouth daily. Multiple Vitamins-Minerals (MULTIVITAMIN OR) Take by mouth daily. No current facility-administered medications on file prior to visit.        HISTORY: reconciled and reviewed with patient  He  has a past medical history of Arr or double vision  HEENT: denies nasal congestion, sinus pain or ST  LUNGS: denies shortness of breath with exertion  CARDIOVASCULAR: denies chest pain on exertion or palpitations  GI: denies abdominal pain, denies heartburn, denies diarrhea  MUSCULOSKELETA recs per ENT    Pharyngeal dysphagia  -  Persistent  -  Proceed with speech therapy  -  NG tube recs per neurosurgery and ENT      No orders of the defined types were placed in this encounter.       Meds & Refills for this Visit:  No prescriptions requested

## 2018-07-13 NOTE — TELEPHONE ENCOUNTER
Patient has swallowing deficit, pt saw [facility] Wednesday, and wants to order outpatient PT, speech therapy with stimulation to facilitate swallowing. If patient goes to outpatient PT services, then [facility] will have to be discontinued.  But patient needs home h

## 2018-07-16 ENCOUNTER — TELEPHONE (OUTPATIENT)
Dept: SURGERY | Facility: CLINIC | Age: 63
End: 2018-07-16

## 2018-07-16 NOTE — TELEPHONE ENCOUNTER
rc order#[id number] from [facility] health re: verbal order for speech therapy on 7/13/18.  placed in nurses bin

## 2018-07-17 NOTE — TELEPHONE ENCOUNTER
fxd order#[id number] from [facility] re: verbal order for speech therapy on 7/13/18. fax completed.  sent to scanning

## 2018-07-19 ENCOUNTER — TELEPHONE (OUTPATIENT)
Dept: FAMILY MEDICINE CLINIC | Facility: CLINIC | Age: 63
End: 2018-07-19

## 2018-07-19 NOTE — TELEPHONE ENCOUNTER
Priya from [address] [place] [address] will see the patient next week 7/23    Seen in ED  7/10 for clogged Gtube

## 2018-07-23 ENCOUNTER — HOSPITAL ENCOUNTER (EMERGENCY)
Facility: HOSPITAL | Age: 63
Discharge: HOME | End: 2018-07-23
Attending: EMERGENCY MEDICINE
Payer: COMMERCIAL

## 2018-07-23 ENCOUNTER — APPOINTMENT (OUTPATIENT)
Dept: GENERAL RADIOLOGY | Facility: HOSPITAL | Age: 63
End: 2018-07-23
Attending: EMERGENCY MEDICINE
Payer: COMMERCIAL

## 2018-07-23 ENCOUNTER — APPOINTMENT (OUTPATIENT)
Dept: GENERAL RADIOLOGY | Facility: HOSPITAL | Age: 63
End: 2018-07-23
Payer: COMMERCIAL

## 2018-07-23 ENCOUNTER — TELEPHONE (OUTPATIENT)
Dept: FAMILY MEDICINE CLINIC | Facility: CLINIC | Age: 63
End: 2018-07-23

## 2018-07-23 VITALS
DIASTOLIC BLOOD PRESSURE: 61 MMHG | TEMPERATURE: 98 F | WEIGHT: 167 LBS | OXYGEN SATURATION: 100 % | BODY MASS INDEX: 22.62 KG/M2 | RESPIRATION RATE: 16 BRPM | HEIGHT: 72 IN | SYSTOLIC BLOOD PRESSURE: 129 MMHG | HEART RATE: 89 BPM

## 2018-07-23 VITALS
TEMPERATURE: 97 F | DIASTOLIC BLOOD PRESSURE: 71 MMHG | HEART RATE: 68 BPM | RESPIRATION RATE: 16 BRPM | SYSTOLIC BLOOD PRESSURE: 137 MMHG | OXYGEN SATURATION: 100 %

## 2018-07-23 DIAGNOSIS — Z46.59 ENCOUNTER FOR NASOGASTRIC (NG) TUBE PLACEMENT: Primary | ICD-10-CM

## 2018-07-23 DIAGNOSIS — T85.598A OBSTRUCTION OF FEEDING TUBE, INITIAL ENCOUNTER: Primary | ICD-10-CM

## 2018-07-23 PROCEDURE — 99284 EMERGENCY DEPT VISIT MOD MDM: CPT

## 2018-07-23 PROCEDURE — 71045 X-RAY EXAM CHEST 1 VIEW: CPT | Performed by: EMERGENCY MEDICINE

## 2018-07-23 PROCEDURE — 99283 EMERGENCY DEPT VISIT LOW MDM: CPT

## 2018-07-23 NOTE — ED NOTES
Unable to advance NG further than 60. Uma ARZOLA MD notified and ok with leaving the tube at 60 at the R nare.

## 2018-07-23 NOTE — ED NOTES
Guide wire still in Dobhoff and not wanting to come out. New 10F Dobhoff inserted measuring 60 at the R nare. Guide wire easily removed.

## 2018-07-23 NOTE — ED PROVIDER NOTES
Patient Seen in: [facility] Emergency Department    History   Patient presents with:  Cath Tube Problem (gastrointestinal, urinary, integumentary)    Stated Complaint:     HPI    23-year-old male who has a history of having a paralyzed right vocal c CHOLECYSTECTOMY      Comment: Dr. Nikole Haile  5/7/2015: COLONOSCOPY N/A      Comment: Procedure: COLONOSCOPY;  Surgeon: Radu Meier MD;  Location: [facility] ENDOSCOPY  No date: GASTRIC BYPASS,OBESITY,SB RECONSTRUC  No date: HERNIA SURGERY  95: THEODORE tenderness  Extremity no clubbing, cyanosis or edema noted.   Full range of motion noted without tenderness  Neuro: No focal deficits noted    ED Course   Labs Reviewed - No data to display    ED Course as of Jul 23 1530  ----------------------------------- and screw fixation C3-4 C4-5 C5-6 and C6-7 levels with intervertebral body fusion devices C3-4 C4-5 and C6-7  OTHER:  There is subcutaneous gas in the right anterior neck tracking into the supraclavicular region.     CONCLUSION:  Postsurgical changes of the large pericardial effusion. No pleural effusion. Negative for pneumothorax or pneumomediastinum. Atelectasis at the lung base bilateral greater on the right.   There is also gas within the subcutaneous tissues of the anterior medial right chest wall outl amount of gas and prevertebral soft tissues consistent with recent surgery. No prevertebral soft tissue thickening. CONCLUSION:  Postop changes of discectomy and fusion as detailed above.     Dictated by: Zoë Ayon MD on 6/28/2018 at 9:39     Appr swallowing study was performed in cooperation with the speech pathologist.  Barium of varying consistencies was administered orally with patient in lateral projection. COMPARISON:  None.   INDICATIONS:  define pharyngeal swallow  PATIENT STATED HISTORY: (A obtained. COMPARISON:  EDAB , XR CHEST AP PORTABLE  (CPT=71045), 7/11/2018, 0:12.   INDICATIONS:  check NG tube placement  PATIENT STATED HISTORY: (As transcribed by Technologist)  Patient had gastric bypass surgery recently, and has had a clogged NG tub CONCLUSION:  Dobbhoff tube tip in the stomach.      Dictated by: Rajinder Guerrero MD on 7/08/2018 at 12:50     Approved by: Rajinder Guerrero MD              [facility]   While here the nurse place a Dobbhoff tube at 60 mm which is where it has been located in th

## 2018-07-23 NOTE — ED PROVIDER NOTES
Patient Seen in: [facility] Emergency Department    History   Patient presents with:  Cath Tube Problem (gastrointestinal, urinary, integumentary)    Stated Complaint: NG tube problems    HPI    44-year-old with a history of vocal cord paralysis wit ENDOSCOPY  No date: GASTRIC BYPASS,OBESITY,SB RECONSTRUC  No date: HERNIA SURGERY  95: KNEE ARTHROSCP HARV  06/27/2018: OTHER      Comment: ANTERIOR CERVICAL DISCECTOMY AND FUSION                EXTENSION.  CERVICAL 3-4, CERVICAL 4-5, CERVICAL Labs Reviewed - No data to display  Chest x-ray: Enteric tube tip in the proximal stomach likely satisfactory  ED Course as of Jul 23 6645  ------------------------------------------------------------    Tube replaced by patient's nurse  CICI Barger

## 2018-07-23 NOTE — TELEPHONE ENCOUNTER
Francisco Grewal, nurse from [facility], called stating pt NG tube is clogged and has been trying to unclog it with flushing and can not verify placement.  has instructed pt to go to the ER for replacement of new NG tube. Voices understanding.

## 2018-07-23 NOTE — ED INITIAL ASSESSMENT (HPI)
Pt to ED from home with c/o clogged NG tube since 2300 last night. Pt is supposed to be on continuous TPN due to hx of gastric bypass, recent spinal surgery that left him with difficulty swallowing.

## 2018-07-23 NOTE — ED NOTES
Old dobhoff removed (measured 60 at the R nare). New 10F dobhoff inserted to 60 at the R nare. No coughing, or shortness of breath- XRAY ordered.

## 2018-07-25 ENCOUNTER — OFFICE VISIT (OUTPATIENT)
Dept: SPEECH THERAPY | Age: 63
End: 2018-07-25
Attending: PHYSICIAN ASSISTANT
Payer: COMMERCIAL

## 2018-07-25 DIAGNOSIS — R13.10 DYSPHAGIA, UNSPECIFIED TYPE: ICD-10-CM

## 2018-07-25 DIAGNOSIS — R29.898 WEAKNESS OF BOTH LOWER EXTREMITIES: ICD-10-CM

## 2018-07-25 DIAGNOSIS — R29.898 WEAKNESS OF BOTH UPPER EXTREMITIES: ICD-10-CM

## 2018-07-25 DIAGNOSIS — Z98.1 S/P CERVICAL SPINAL FUSION: ICD-10-CM

## 2018-07-25 DIAGNOSIS — M50.00 CERVICAL DISC DISORDER WITH MYELOPATHY: ICD-10-CM

## 2018-07-25 PROCEDURE — 92610 EVALUATE SWALLOWING FUNCTION: CPT

## 2018-07-25 PROCEDURE — 92526 ORAL FUNCTION THERAPY: CPT

## 2018-07-25 NOTE — PROGRESS NOTES
ADULT SWALLOWING EVALUATION  Referring Physician: Dr. Ruy Leo  Diagnosis: Dysphagia R13.12     Date of Service: 7/25/2018     PATIENT SUMMARY  Lobo Marino is a 60year old y/o male who presents to therapy today with complaints of difficulty swallowing HYDROcodone-acetaminophen 5-325 MG Oral Tab 1 tablet by Per NG Tube route every 6 (six) hours as needed for Pain. Disp: 60 tablet Rfl: 0   Mexiletine HCl 150 MG Oral Cap Take 150 mg by mouth 2 (two) times daily.  Disp:  Rfl:    albuterol sulfate (2.5 MG/3 was placed on steroids to see if the swelling would go down and his voice would return to normal. He did not respond to steroids, so he was referred to an ENT, who found vocal cord paresis.  His swallowing is significantly impacted immediately post surgery but can wear a soft collar if needed at night and when doing exercises. Rate of Motion: reduced rate of motion noted.    Vocal Quality: breathy, weak, hoarse  Respiration: WFL  Consistencies Trialed: no consistencies tested today due to risk of aspiration Please co-sign or sign and return this letter via fax as soon as possible to [phone].  If you have any questions, please contact me at Dept: [phone]    Sincerely,  Electronically signed by therapist: Ajit Tesfaye MS, CCC-SLP/L  Licensed Speech

## 2018-07-28 ENCOUNTER — HOSPITAL ENCOUNTER (EMERGENCY)
Facility: HOSPITAL | Age: 63
Discharge: HOME | End: 2018-07-28
Attending: EMERGENCY MEDICINE
Payer: COMMERCIAL

## 2018-07-28 ENCOUNTER — APPOINTMENT (OUTPATIENT)
Dept: GENERAL RADIOLOGY | Facility: HOSPITAL | Age: 63
End: 2018-07-28
Attending: EMERGENCY MEDICINE
Payer: COMMERCIAL

## 2018-07-28 VITALS
OXYGEN SATURATION: 98 % | HEIGHT: 72 IN | WEIGHT: 168 LBS | HEART RATE: 71 BPM | SYSTOLIC BLOOD PRESSURE: 103 MMHG | RESPIRATION RATE: 16 BRPM | DIASTOLIC BLOOD PRESSURE: 56 MMHG | TEMPERATURE: 98 F | BODY MASS INDEX: 22.75 KG/M2

## 2018-07-28 DIAGNOSIS — T85.598A OBSTRUCTION OF FEEDING TUBE, INITIAL ENCOUNTER: Primary | ICD-10-CM

## 2018-07-28 PROCEDURE — 43752 NASAL/OROGASTRIC W/TUBE PLMT: CPT | Performed by: EMERGENCY MEDICINE

## 2018-07-28 PROCEDURE — 99283 EMERGENCY DEPT VISIT LOW MDM: CPT

## 2018-07-28 PROCEDURE — 76000 FLUOROSCOPY <1 HR PHYS/QHP: CPT | Performed by: EMERGENCY MEDICINE

## 2018-07-28 NOTE — ED INITIAL ASSESSMENT (HPI)
Pt to ER c/o clogged feeding tub. Pt states it's his 4th incident. Pt had neck surgery 5 weeks ago and had feeding tube placed 4 weeks ago.

## 2018-07-28 NOTE — ED PROVIDER NOTES
Patient Seen in: [facility] Emergency Department    History   Patient presents with:  Cath Tube Problem (gastrointestinal, urinary, integumentary)    Stated Complaint: clogged dobhoff tube    HPI    58-year-old male who comes in the hospital with a Comment: Dr. Aletha Woods  5/7/2015: COLONOSCOPY N/A      Comment: Procedure: COLONOSCOPY;  Surgeon: Owen Dykes MD;  Location: [facility] ENDOSCOPY  No date: GASTRIC BYPASS,OBESITY,SB RECONSTRUC  No date: HERNIA SURGERY  95: 911 In[employer]  06/ without CVA tenderness  Extremity no clubbing, cyanosis or edema noted.     Neuro: No focal deficits noted    ED Course   Labs Reviewed - No data to display    ED Course as of Jul 28 1652  ------------------------------------------------------------    The

## 2018-07-30 ENCOUNTER — OFFICE VISIT (OUTPATIENT)
Dept: SPEECH THERAPY | Age: 63
End: 2018-07-30
Attending: PHYSICIAN ASSISTANT
Payer: COMMERCIAL

## 2018-07-30 PROCEDURE — 92526 ORAL FUNCTION THERAPY: CPT

## 2018-07-30 NOTE — PROGRESS NOTES
Treatment #1   Treatment Time: 60 minutes  Precautions: aspiration        Charges: 1 billed (06110)  Pain: 0/10        Diagnosis: Dysphagia R13.12          Subjective: patient was cooperative.  States that he tried yogurt once during the week at home and ha

## 2018-07-31 ENCOUNTER — OFFICE VISIT (OUTPATIENT)
Dept: FAMILY MEDICINE CLINIC | Facility: CLINIC | Age: 63
End: 2018-07-31
Payer: COMMERCIAL

## 2018-07-31 VITALS
RESPIRATION RATE: 12 BRPM | WEIGHT: 171 LBS | SYSTOLIC BLOOD PRESSURE: 112 MMHG | HEART RATE: 60 BPM | BODY MASS INDEX: 23.16 KG/M2 | HEIGHT: 72 IN | TEMPERATURE: 97 F | DIASTOLIC BLOOD PRESSURE: 74 MMHG

## 2018-07-31 DIAGNOSIS — M21.371 FOOT DROP, RIGHT: ICD-10-CM

## 2018-07-31 DIAGNOSIS — R13.13 PHARYNGEAL DYSPHAGIA: Primary | ICD-10-CM

## 2018-07-31 DIAGNOSIS — M48.061 FORAMINAL STENOSIS OF LUMBAR REGION: ICD-10-CM

## 2018-07-31 DIAGNOSIS — J38.00 VOCAL CORD PARESIS: ICD-10-CM

## 2018-07-31 PROCEDURE — 99213 OFFICE O/P EST LOW 20 MIN: CPT | Performed by: FAMILY MEDICINE

## 2018-07-31 NOTE — PROGRESS NOTES
[facility] Family Medicine Office Note  Chief Complaint:   Patient presents with:  ER F/U      HPI:   This is a 60year old male coming in for ER follow up for NG tube clogging and vocal cord paresis.   Going to speech therapy and feels that irrit date: ANGIOGRAM  2009: CARDIAC DEFIBRILLATOR PLACEMENT      Comment: Neo Orellana, magnet response inhibit                therapy, not pacemaker dependent  2012: CHOLECYSTECTOMY      Comment: Dr. Pema Jacobo  5/7/2015: COLONOSCOPY N/A      Comment: Proced (two) times daily. Disp:  Rfl:    HYDROcodone-acetaminophen 5-325 MG Oral Tab 1 tablet by Per NG Tube route every 6 (six) hours as needed for Pain. Disp: 60 tablet Rfl: 0   Mexiletine HCl 150 MG Oral Cap Take 150 mg by mouth 2 (two) times daily.  Disp:  R palpitations or edema. Denies any dyspnea on exertion or at rest  RESPIRATORY:  Denies shortness of breath, cough  GASTROINTESTINAL:  Denies any abdominal pain, nausea, vomiting, constipation, diarrhea, or blood in stool.   NEUROLOGICAL:  Denies headache, Highest Risk Adult(2 of 3 - PCV13) due on 04/02/2015  Annual Physical due on 10/19/2017    Patient/Caregiver Education: Patient/Caregiver Education: There are no barriers to learning. Medical education done. Outcome: Patient verbalizes understanding.  Kevin Javier

## 2018-08-01 ENCOUNTER — TELEPHONE (OUTPATIENT)
Dept: SURGERY | Facility: CLINIC | Age: 63
End: 2018-08-01

## 2018-08-01 ENCOUNTER — TELEPHONE (OUTPATIENT)
Dept: SURGERY | Facility: HOSPITAL | Age: 63
End: 2018-08-01

## 2018-08-01 NOTE — TELEPHONE ENCOUNTER
Per Ty Schaeffer. [id number] Diamond Tesfaye for patient to be added to tomorrow's schedule at 8:30 am.      Patient has already been scheduled.

## 2018-08-01 NOTE — TELEPHONE ENCOUNTER
Patient called. Wife answered. Wife placed phone on speaker phone. Spoke to wife and patient per HIPPA. Patient states within last 1-2 weeks developed a \"right foot drop. \" States when he walks his right foot slap the ground.   States no weakness anywhere

## 2018-08-01 NOTE — TELEPHONE ENCOUNTER
Spoke with TIERRA Kee and informed her of below situation. She is going to review the images with  and call nursing back with a plan.

## 2018-08-02 ENCOUNTER — TELEPHONE (OUTPATIENT)
Dept: SURGERY | Facility: CLINIC | Age: 63
End: 2018-08-02

## 2018-08-02 ENCOUNTER — OFFICE VISIT (OUTPATIENT)
Dept: SURGERY | Facility: CLINIC | Age: 63
End: 2018-08-02
Payer: COMMERCIAL

## 2018-08-02 ENCOUNTER — MED REC SCAN ONLY (OUTPATIENT)
Dept: FAMILY MEDICINE CLINIC | Facility: CLINIC | Age: 63
End: 2018-08-02

## 2018-08-02 VITALS — HEART RATE: 64 BPM | DIASTOLIC BLOOD PRESSURE: 68 MMHG | SYSTOLIC BLOOD PRESSURE: 108 MMHG

## 2018-08-02 DIAGNOSIS — M50.00 CERVICAL DISC DISORDER WITH MYELOPATHY: ICD-10-CM

## 2018-08-02 DIAGNOSIS — Z98.1 S/P CERVICAL SPINAL FUSION: ICD-10-CM

## 2018-08-02 DIAGNOSIS — R13.10 DYSPHAGIA, UNSPECIFIED TYPE: ICD-10-CM

## 2018-08-02 DIAGNOSIS — M50.30 DEGENERATIVE DISC DISEASE, CERVICAL: ICD-10-CM

## 2018-08-02 DIAGNOSIS — R20.2 NUMBNESS AND TINGLING OF RIGHT LOWER EXTREMITY: ICD-10-CM

## 2018-08-02 DIAGNOSIS — R20.0 NUMBNESS AND TINGLING OF RIGHT LOWER EXTREMITY: ICD-10-CM

## 2018-08-02 DIAGNOSIS — M21.371 RIGHT FOOT DROP: Primary | ICD-10-CM

## 2018-08-02 DIAGNOSIS — Z98.890 POST-OPERATIVE STATE: ICD-10-CM

## 2018-08-02 PROCEDURE — 99214 OFFICE O/P EST MOD 30 MIN: CPT | Performed by: PHYSICIAN ASSISTANT

## 2018-08-02 RX ORDER — ORPHENADRINE CITRATE 100 MG/1
100 TABLET, EXTENDED RELEASE ORAL 2 TIMES DAILY
Qty: 40 TABLET | Refills: 0 | Status: SHIPPED | OUTPATIENT
Start: 2018-08-02 | End: 2018-08-08 | Stop reason: ALTCHOICE

## 2018-08-02 NOTE — PATIENT INSTRUCTIONS
Refill policies:    • Allow 2-3 business days for refills; controlled substances may take longer.   • Contact your pharmacy at least 5 days prior to running out of medication and have them send an electronic request or submit request through the “request re entire amount billed. Precertification and Prior Authorizations: If your physician has recommended that you have a procedure or additional testing performed.   [facility]) will contact your insurance carrier to obtain pre-certi

## 2018-08-02 NOTE — TELEPHONE ENCOUNTER
Pt directed to [facility] [place] in 2 wks post EMG, please advise where to schedule. Pt will need call back to schedule appointment.

## 2018-08-02 NOTE — TELEPHONE ENCOUNTER
Called pt who is scheduled for earliest available EMG on 08/23, so FU scheduled for 08/30, pt appreciative.

## 2018-08-02 NOTE — PROGRESS NOTES
Pt states that he has the  weakness and tingling in the right foot. Pt states that he is also having some balance issues. Pt states that he slaps his foot on the pavement. Pt states that he is also having so lower back issues.

## 2018-08-02 NOTE — PROGRESS NOTES
Neurosurgery   Follow up      REASON FOR VISIT: Right foot drop      HISTORY OF PRESENT ILLNESS:Favio JENSEN Yesenia Collado is a 60year old s/p ACDF extension, C3-4, C4-5, C6-7 with Dr. Eirc Catherine 6/27/18. Patient is an NG tube.   He is seeing speech pathology as well as Tinel's negative.     Upper extremity strength:    Deltoid  Biceps  Triceps    Intrinsics     Right 5 5 5 5 5     Left 5 5 5 5 5     Lower extremity strength:     Iliopsoas  Hamstrings   Quads    D-flexion P-flexion Great Toe   Right       5         5 immediate complication. Dose reduction techniques were used. Dose information is transmitted to the [facility]) NRDR ([address]) which includes the Dose Index Registry.      PATIENT STATED HISTORY:(As tra of disc height. There is flattening of the anterior thecal sac. Severe right and moderate to severe left neural foraminal narrowing. C5-C6:  Fused level. Patent central canal.  AP dimension is measured at 1.4 cm in the midline.   Moderate bilateral neur findings without identification of additional pathology. The nerve roots please fill symmetric in the lumbar spine.     =====  CONCLUSION:    1.  Multilevel cervical spondylosis, overall considered moderate. Prior C5-C6 anterior plate and screw fixation. presentation, hx of wearing Kannan hose that go up to the fibular head, and physical examination, most likely a right peroneal nerve compression. Will how on any further lumbar imaging at this time until EMG report is resulted.     - Ordered today:    - None

## 2018-08-03 ENCOUNTER — APPOINTMENT (OUTPATIENT)
Dept: GENERAL RADIOLOGY | Facility: HOSPITAL | Age: 63
End: 2018-08-03
Attending: EMERGENCY MEDICINE
Payer: COMMERCIAL

## 2018-08-03 ENCOUNTER — TELEPHONE (OUTPATIENT)
Dept: FAMILY MEDICINE CLINIC | Facility: CLINIC | Age: 63
End: 2018-08-03

## 2018-08-03 ENCOUNTER — HOSPITAL ENCOUNTER (EMERGENCY)
Facility: HOSPITAL | Age: 63
Discharge: HOME | End: 2018-08-03
Attending: EMERGENCY MEDICINE
Payer: COMMERCIAL

## 2018-08-03 ENCOUNTER — APPOINTMENT (OUTPATIENT)
Dept: CT IMAGING | Facility: HOSPITAL | Age: 63
End: 2018-08-03
Attending: EMERGENCY MEDICINE
Payer: COMMERCIAL

## 2018-08-03 ENCOUNTER — TELEPHONE (OUTPATIENT)
Dept: SURGERY | Facility: CLINIC | Age: 63
End: 2018-08-03

## 2018-08-03 VITALS
WEIGHT: 172 LBS | TEMPERATURE: 99 F | HEART RATE: 68 BPM | OXYGEN SATURATION: 99 % | HEIGHT: 72 IN | RESPIRATION RATE: 16 BRPM | BODY MASS INDEX: 23.3 KG/M2 | DIASTOLIC BLOOD PRESSURE: 66 MMHG | SYSTOLIC BLOOD PRESSURE: 127 MMHG

## 2018-08-03 DIAGNOSIS — R09.89 FOREIGN BODY SENSATION IN THROAT: Primary | ICD-10-CM

## 2018-08-03 LAB
ALBUMIN SERPL-MCNC: 3.3 G/DL (ref 3.5–4.8)
ALBUMIN/GLOB SERPL: 1 {RATIO} (ref 1–2)
ALP LIVER SERPL-CCNC: 127 U/L (ref 45–117)
ALT SERPL-CCNC: 132 U/L (ref 17–63)
ANION GAP SERPL CALC-SCNC: 6 MMOL/L (ref 0–18)
AST SERPL-CCNC: 73 U/L (ref 15–41)
BASOPHILS # BLD AUTO: 0.02 X10(3) UL (ref 0–0.1)
BASOPHILS NFR BLD AUTO: 0.4 %
BILIRUB SERPL-MCNC: 0.9 MG/DL (ref 0.1–2)
BUN BLD-MCNC: 15 MG/DL (ref 8–20)
BUN/CREAT SERPL: 24.2 (ref 10–20)
CALCIUM BLD-MCNC: 8.7 MG/DL (ref 8.3–10.3)
CHLORIDE SERPL-SCNC: 103 MMOL/L (ref 101–111)
CO2 SERPL-SCNC: 31 MMOL/L (ref 22–32)
CREAT BLD-MCNC: 0.62 MG/DL (ref 0.7–1.3)
EOSINOPHIL # BLD AUTO: 0.29 X10(3) UL (ref 0–0.3)
EOSINOPHIL NFR BLD AUTO: 5.5 %
ERYTHROCYTE [DISTWIDTH] IN BLOOD BY AUTOMATED COUNT: 13 % (ref 11.5–16)
GLOBULIN PLAS-MCNC: 3.2 G/DL (ref 2.5–3.7)
GLUCOSE BLD-MCNC: 82 MG/DL (ref 70–99)
HCT VFR BLD AUTO: 41.6 % (ref 37–53)
HGB BLD-MCNC: 13.8 G/DL (ref 13–17)
IMMATURE GRANULOCYTE COUNT: 0.06 X10(3) UL (ref 0–1)
IMMATURE GRANULOCYTE RATIO %: 1.1 %
LYMPHOCYTES # BLD AUTO: 0.94 X10(3) UL (ref 0.9–4)
LYMPHOCYTES NFR BLD AUTO: 17.7 %
M PROTEIN MFR SERPL ELPH: 6.5 G/DL (ref 6.1–8.3)
MCH RBC QN AUTO: 30.9 PG (ref 27–33.2)
MCHC RBC AUTO-ENTMCNC: 33.2 G/DL (ref 31–37)
MCV RBC AUTO: 93.3 FL (ref 80–99)
MONOCYTES # BLD AUTO: 0.8 X10(3) UL (ref 0.1–1)
MONOCYTES NFR BLD AUTO: 15.1 %
NEUTROPHIL ABS PRELIM: 3.2 X10 (3) UL (ref 1.3–6.7)
NEUTROPHILS # BLD AUTO: 3.2 X10(3) UL (ref 1.3–6.7)
NEUTROPHILS NFR BLD AUTO: 60.2 %
OSMOLALITY SERPL CALC.SUM OF ELEC: 290 MOSM/KG (ref 275–295)
PLATELET # BLD AUTO: 176 10(3)UL (ref 150–450)
POTASSIUM SERPL-SCNC: 4.1 MMOL/L (ref 3.6–5.1)
RBC # BLD AUTO: 4.46 X10(6)UL (ref 4.3–5.7)
RED CELL DISTRIBUTION WIDTH-SD: 44.5 FL (ref 35.1–46.3)
SODIUM SERPL-SCNC: 140 MMOL/L (ref 136–144)
WBC # BLD AUTO: 5.3 X10(3) UL (ref 4–13)

## 2018-08-03 PROCEDURE — 99285 EMERGENCY DEPT VISIT HI MDM: CPT

## 2018-08-03 PROCEDURE — 85025 COMPLETE CBC W/AUTO DIFF WBC: CPT | Performed by: EMERGENCY MEDICINE

## 2018-08-03 PROCEDURE — 99284 EMERGENCY DEPT VISIT MOD MDM: CPT

## 2018-08-03 PROCEDURE — 43752 NASAL/OROGASTRIC W/TUBE PLMT: CPT | Performed by: EMERGENCY MEDICINE

## 2018-08-03 PROCEDURE — 80053 COMPREHEN METABOLIC PANEL: CPT | Performed by: EMERGENCY MEDICINE

## 2018-08-03 PROCEDURE — 36415 COLL VENOUS BLD VENIPUNCTURE: CPT

## 2018-08-03 PROCEDURE — 70491 CT SOFT TISSUE NECK W/DYE: CPT | Performed by: EMERGENCY MEDICINE

## 2018-08-03 RX ORDER — CYCLOBENZAPRINE HCL 10 MG
10 TABLET ORAL 3 TIMES DAILY PRN
Qty: 60 TABLET | Refills: 0 | Status: SHIPPED | OUTPATIENT
Start: 2018-08-03 | End: 2018-08-23 | Stop reason: ALTCHOICE

## 2018-08-03 RX ORDER — AZITHROMYCIN 200 MG/5ML
POWDER, FOR SUSPENSION ORAL
Qty: 15 ML | Refills: 0 | Status: SHIPPED | OUTPATIENT
Start: 2018-08-03 | End: 2018-08-23 | Stop reason: ALTCHOICE

## 2018-08-03 NOTE — TELEPHONE ENCOUNTER
Norflex is extended release and pt must take pills ground up and diluted thru NG tube and cannot crush extended release. Pharmacist recommended Flexril or Robaxin.  Please call for different rx and call pt's wife to let her know [phone]

## 2018-08-03 NOTE — ED PROVIDER NOTES
Patient Seen in: [facility] Emergency Department    History   Patient presents with:  FB in Throat (GI, respiratory)    Stated Complaint: feels like something is stuck in his throat    HPI    Patient is a 41-year-old male who presents with a foreign Scietnific, magnet response inhibit                therapy, not pacemaker dependent  2012: CHOLECYSTECTOMY      Comment: Dr. Samy Lilly  5/7/2015: COLONOSCOPY N/A      Comment: Procedure: COLONOSCOPY;  Surgeon: Elmer Charles MD;  Location: [facility] Mouth/Throat: Oropharynx is clear and moist.   Eyes: Conjunctivae are normal. Pupils are equal, round, and reactive to light. Neck:   Aspen cervical collar in place. Cardiovascular: Normal rate, regular rhythm and normal heart sounds.     Pulmonary/Ch Chauncey Maria MD on 8/03/2018 at 15:39     Approved by: Ariadne Ramos MD              ED Course as of Aug 03 1755  ------------------------------------------------------------      MDM   78-year-old male with a chronic Dobbhoff feeding tube, presenting wit [address]  [phone]    In 3 days  As needed        Medications Prescribed:  Current Discharge Medication List    START taking these medications    azithromycin 200 MG/5ML Oral Recon Susp  12.5mL on day one. Then 6.25mL x 4 days.   Qty: 15 mL Refills: 0    C

## 2018-08-03 NOTE — ED INITIAL ASSESSMENT (HPI)
Sprayed biotin in throat 45 minutes ago. Feels like throat is \"closing\". Complaining of IAN, coughing, and having a lot of secretions. Has NGT in place.

## 2018-08-03 NOTE — TELEPHONE ENCOUNTER
Will send to provider in office to see if she can write for regular, not Extended release or for other medication.

## 2018-08-03 NOTE — ED NOTES
Spoke w/ Dr Trinidad Sears partner. He states the safest way for a Dobhoff to be placed is via flouroscopy. Pt to see Dr Luisana Mazariegos in office soon so they are to ask him whether staff are okay to place dobhoff blindly or via fluoroscopy.

## 2018-08-08 ENCOUNTER — OFFICE VISIT (OUTPATIENT)
Dept: FAMILY MEDICINE CLINIC | Facility: CLINIC | Age: 63
End: 2018-08-08
Payer: COMMERCIAL

## 2018-08-08 VITALS
TEMPERATURE: 97 F | HEART RATE: 97 BPM | SYSTOLIC BLOOD PRESSURE: 94 MMHG | WEIGHT: 169 LBS | DIASTOLIC BLOOD PRESSURE: 60 MMHG | BODY MASS INDEX: 23 KG/M2 | RESPIRATION RATE: 16 BRPM | OXYGEN SATURATION: 96 %

## 2018-08-08 DIAGNOSIS — R13.13 PHARYNGEAL DYSPHAGIA: Primary | ICD-10-CM

## 2018-08-08 DIAGNOSIS — J38.00 VOCAL CORD PARESIS: ICD-10-CM

## 2018-08-08 DIAGNOSIS — E07.9 THYROID MASS: ICD-10-CM

## 2018-08-08 PROCEDURE — 99213 OFFICE O/P EST LOW 20 MIN: CPT | Performed by: FAMILY MEDICINE

## 2018-08-08 NOTE — PROGRESS NOTES
[facility] Family Medicine Office Note  Chief Complaint:   Patient presents with:  ER F/U      HPI:   This is a 60year old male coming in for follow-up of vocal cord paresis and pharyngeal dysphasia.   Patient continues to have a Dobbhoff tube in Sydnie Reina MD;  Location: [address] [place] [address] ENDOSCOPY  No date: GASTRIC BYPASS,OBESITY,SB RECONSTRUC  No date: HERNIA SURGERY  95: KNEE ARTHROSCP HARV  06/27/2018: OTHER      Comment: ANTERIOR CERVICAL DISCECTOMY AND FUSION                EXTENSION.  CERVICAL Mexiletine HCl 150 MG Oral Cap Take 150 mg by mouth 2 (two) times daily. Disp:  Rfl:    acetaminophen 500 MG Oral Tab Take 1,000 mg by mouth every 6 (six) hours as needed for Pain.  Disp:  Rfl:    Montelukast Sodium (SINGULAIR) 10 MG Oral Tab Take 10 mg b numbness or tingling in the extremities. MUSCULOSKELETAL:  Denies muscle, back pain, joint pain or stiffness.     EXAM:   BP 94/60   Pulse 97   Temp 97.1 °F (36.2 °C) (Oral)   Resp 16   Wt 169 lb   SpO2 96%   BMI 22.92 kg/m²  Estimated body mass index is 2 Abdominal pain, unspecified site     Unspecified asthma(493.90)     Paroxysmal ventricular tachycardia (HCC)     Essential hypertension with goal blood pressure less than 130/80     Stiffness of joint, not elsewhere classified, pelvic region and thigh

## 2018-08-09 ENCOUNTER — OFFICE VISIT (OUTPATIENT)
Dept: SPEECH THERAPY | Age: 63
End: 2018-08-09
Attending: PHYSICIAN ASSISTANT
Payer: COMMERCIAL

## 2018-08-09 PROCEDURE — 92526 ORAL FUNCTION THERAPY: CPT

## 2018-08-09 NOTE — PROGRESS NOTES
Treatment #2   Treatment Time: 60 minutes  Precautions: aspiration        Charges: 1 billed (53448)  Pain: 0/10        Diagnosis: Dysphagia R13.12          Subjective: patient was cooperative.  Reports frequent trips to ER due to his ng being clogged and ha

## 2018-08-10 ENCOUNTER — OFFICE VISIT (OUTPATIENT)
Dept: SPEECH THERAPY | Age: 63
End: 2018-08-10
Attending: PHYSICIAN ASSISTANT
Payer: COMMERCIAL

## 2018-08-10 PROCEDURE — 92526 ORAL FUNCTION THERAPY: CPT

## 2018-08-10 NOTE — PROGRESS NOTES
Treatment #3   Treatment Time: 60 minutes  Precautions: aspiration        Charges: 1 billed (80049)  Pain: 0/10        Diagnosis: Dysphagia R13.12          Subjective: patient was cooperative.  Reports frequent trips to ER due to his ng being clogged and ha

## 2018-08-12 ENCOUNTER — APPOINTMENT (OUTPATIENT)
Dept: GENERAL RADIOLOGY | Facility: HOSPITAL | Age: 63
End: 2018-08-12
Attending: EMERGENCY MEDICINE
Payer: COMMERCIAL

## 2018-08-12 ENCOUNTER — HOSPITAL ENCOUNTER (EMERGENCY)
Facility: HOSPITAL | Age: 63
Discharge: HOME | End: 2018-08-12
Attending: EMERGENCY MEDICINE
Payer: COMMERCIAL

## 2018-08-12 VITALS
WEIGHT: 169 LBS | TEMPERATURE: 98 F | RESPIRATION RATE: 14 BRPM | SYSTOLIC BLOOD PRESSURE: 112 MMHG | OXYGEN SATURATION: 100 % | BODY MASS INDEX: 22.89 KG/M2 | HEART RATE: 56 BPM | DIASTOLIC BLOOD PRESSURE: 70 MMHG | HEIGHT: 72 IN

## 2018-08-12 DIAGNOSIS — T85.598A FEEDING TUBE BLOCKED, INITIAL ENCOUNTER: Primary | ICD-10-CM

## 2018-08-12 PROCEDURE — 76000 FLUOROSCOPY <1 HR PHYS/QHP: CPT | Performed by: EMERGENCY MEDICINE

## 2018-08-12 PROCEDURE — 99283 EMERGENCY DEPT VISIT LOW MDM: CPT

## 2018-08-12 NOTE — ED INITIAL ASSESSMENT (HPI)
dobhoff tube is not patent,   Flush attempted many times by family.    Tube has been replaced 6 times

## 2018-08-12 NOTE — ED PROVIDER NOTES
Patient Seen in: [facility] Emergency Department    History   Patient presents with:  Cath Tube Problem (gastrointestinal, urinary, integumentary)    Stated Complaint: clogged dobhoff    HPI    Patient presents with clogged Dobbhoff tube.   The patie magnet response inhibit                therapy, not pacemaker dependent  2012: CHOLECYSTECTOMY      Comment: Dr. Sonido Crenshaw  5/7/2015: COLONOSCOPY N/A      Comment: Procedure: COLONOSCOPY;  Surgeon: Fatuma Vargas MD;  Location: [address] [place] [address] collar intact. Cardiovascular: Regular rate and rhythm, no murmurs. Respiratory: Lungs clear to auscultation. Abdomen: Soft, nontender. Extremities: No CCE. Skin: Warm and dry.     ED Course   Labs Reviewed - No data to display    Xr Fluoro Physician T

## 2018-08-12 NOTE — ED NOTES
Attempt to flush with cola unsuccessful,  Family has tried cola and meat tenderizer many times in past without success.

## 2018-08-13 ENCOUNTER — OFFICE VISIT (OUTPATIENT)
Dept: SPEECH THERAPY | Age: 63
End: 2018-08-13
Attending: PHYSICIAN ASSISTANT
Payer: COMMERCIAL

## 2018-08-13 PROCEDURE — 92526 ORAL FUNCTION THERAPY: CPT

## 2018-08-13 NOTE — PROGRESS NOTES
Treatment #4   Treatment Time: 60 minutes  Precautions: aspiration        Charges: 1 billed (59172)  Pain: 0/10        Diagnosis: Dysphagia R13.12          Subjective: patient was cooperative.  Reports frequent trips to ER due to his ng being clogged and ha as part of his POC.

## 2018-08-14 ENCOUNTER — OFFICE VISIT (OUTPATIENT)
Dept: SPEECH THERAPY | Age: 63
End: 2018-08-14
Attending: PHYSICIAN ASSISTANT
Payer: COMMERCIAL

## 2018-08-14 PROCEDURE — 92526 ORAL FUNCTION THERAPY: CPT

## 2018-08-14 NOTE — PROGRESS NOTES
Treatment #5   Treatment Time: 60 minutes  Precautions: aspiration        Charges: 1 billed (35637)  Pain: 0/10        Diagnosis: Dysphagia R13.12          Subjective: patient was cooperative.  Reports frequent trips to ER due to his ng being clogged and ha as part of his POC.

## 2018-08-15 ENCOUNTER — HOSPITAL ENCOUNTER (OUTPATIENT)
Dept: GENERAL RADIOLOGY | Facility: HOSPITAL | Age: 63
Discharge: HOME | End: 2018-08-15
Attending: FAMILY MEDICINE
Payer: COMMERCIAL

## 2018-08-15 ENCOUNTER — PATIENT MESSAGE (OUTPATIENT)
Dept: FAMILY MEDICINE CLINIC | Facility: CLINIC | Age: 63
End: 2018-08-15

## 2018-08-15 DIAGNOSIS — J38.00 VOCAL CORD PARESIS: ICD-10-CM

## 2018-08-15 DIAGNOSIS — R13.13 PHARYNGEAL DYSPHAGIA: ICD-10-CM

## 2018-08-15 PROCEDURE — 92611 MOTION FLUOROSCOPY/SWALLOW: CPT

## 2018-08-15 PROCEDURE — 74230 X-RAY XM SWLNG FUNCJ C+: CPT | Performed by: FAMILY MEDICINE

## 2018-08-15 NOTE — ADDENDUM NOTE
Encounter addended by: Nisha Gross on: 8/15/2018  2:55 PM<BR>    Actions taken: Charge Capture section accepted

## 2018-08-15 NOTE — PROGRESS NOTES
ADULT VIDEOFLUOROSCOPIC SWALLOWING STUDY    Admission Date: 8/15/2018  Evaluation Date: 08/15/18  Radiologist: Dr. Pablo Horse: NPO  Diet Recommendations - Liquid: NPO    Further Follow-up:              Medic full po diet     ASSESSMENT   DYSPHAGIA ASSESSMENT  Test completed in conjunction with Radiologist.  Patient Positioned: Upright;Midline;Standard Chair. Patient Viewed: Lateral.   .  Consistencies Presented: Thin liquids; Nectar thick liquids;Puree to asse Partial        Penetration Aspiration Scale Score: Score 8: Material enters the airway, passes below the vocal folds, and no effort is made to eject       Overall Impression: Patient presents with grossly functional oral phase and persistent severe pharyng

## 2018-08-16 ENCOUNTER — OFFICE VISIT (OUTPATIENT)
Dept: SPEECH THERAPY | Age: 63
End: 2018-08-16
Attending: PHYSICIAN ASSISTANT
Payer: COMMERCIAL

## 2018-08-16 PROCEDURE — 92526 ORAL FUNCTION THERAPY: CPT

## 2018-08-16 NOTE — TELEPHONE ENCOUNTER
From: Kaylin Bustillo  To: Kate Clayton DO  Sent: 8/15/2018 11:15 AM CDT  Subject: Non-Urgent Medical Question    Hi Dr Ronna Rajput,    Today's video swallow study indicated improvement but still a long way to go before Korene Angelucci is able to have the Dobhoff tu

## 2018-08-16 NOTE — PROGRESS NOTES
Treatment #6   Treatment Time: 60 minutes  Precautions: aspiration        Charges: 1 billed (12184)  Pain: 0/10        Diagnosis: Dysphagia R13.12          Subjective: patient was cooperative.  Did therapy today with vitalstim in position 3a at level 11 for

## 2018-08-16 NOTE — TELEPHONE ENCOUNTER
Please let wife know that any tube for feeding from stomach or dubhoff will have to come from surgery. I don't have any further insights into dubhoff vs. Stomach tube and should be discussed with bariatric surgeon.

## 2018-08-20 ENCOUNTER — TELEPHONE (OUTPATIENT)
Dept: PHYSICAL THERAPY | Age: 63
End: 2018-08-20

## 2018-08-21 ENCOUNTER — OFFICE VISIT (OUTPATIENT)
Dept: SURGERY | Facility: CLINIC | Age: 63
End: 2018-08-21
Payer: COMMERCIAL

## 2018-08-21 VITALS
WEIGHT: 166.69 LBS | HEIGHT: 71 IN | RESPIRATION RATE: 16 BRPM | SYSTOLIC BLOOD PRESSURE: 107 MMHG | DIASTOLIC BLOOD PRESSURE: 69 MMHG | HEART RATE: 65 BPM | BODY MASS INDEX: 23.34 KG/M2

## 2018-08-21 DIAGNOSIS — E66.09 CLASS 1 OBESITY DUE TO EXCESS CALORIES WITH SERIOUS COMORBIDITY IN ADULT, UNSPECIFIED BMI: Primary | ICD-10-CM

## 2018-08-21 NOTE — PROGRESS NOTES
[address], [address] [address]  Dept: [phone]    8/21/2018    BARIATRIC EXISTING PATIENT/FOLLOW UP    HPI:  Bipin Grace is a 60year old-year old male who pr does have bilateral weakness in his dorsiflexion of his lower extremities.   It is symmetrical    ASSESSMENT:  Status post gastric bypass in that regard he is done perfectly well however he has had a complication since his cervical disc fusion resulting in

## 2018-08-22 ENCOUNTER — APPOINTMENT (OUTPATIENT)
Dept: GENERAL RADIOLOGY | Facility: HOSPITAL | Age: 63
End: 2018-08-22
Attending: EMERGENCY MEDICINE
Payer: COMMERCIAL

## 2018-08-22 ENCOUNTER — APPOINTMENT (OUTPATIENT)
Dept: GENERAL RADIOLOGY | Age: 63
End: 2018-08-22
Attending: EMERGENCY MEDICINE
Payer: COMMERCIAL

## 2018-08-22 ENCOUNTER — APPOINTMENT (OUTPATIENT)
Dept: SPEECH THERAPY | Age: 63
End: 2018-08-22
Attending: FAMILY MEDICINE
Payer: COMMERCIAL

## 2018-08-22 ENCOUNTER — HOSPITAL ENCOUNTER (EMERGENCY)
Facility: HOSPITAL | Age: 63
Discharge: HOME | End: 2018-08-22
Attending: EMERGENCY MEDICINE
Payer: COMMERCIAL

## 2018-08-22 VITALS
SYSTOLIC BLOOD PRESSURE: 129 MMHG | RESPIRATION RATE: 18 BRPM | HEART RATE: 84 BPM | OXYGEN SATURATION: 100 % | DIASTOLIC BLOOD PRESSURE: 69 MMHG | TEMPERATURE: 98 F

## 2018-08-22 DIAGNOSIS — Z46.59 ENCOUNTER FOR NASOGASTRIC (NG) TUBE PLACEMENT: ICD-10-CM

## 2018-08-22 DIAGNOSIS — Z46.59 ENCOUNTER FOR FEEDING TUBE PLACEMENT: Primary | ICD-10-CM

## 2018-08-22 PROCEDURE — 76000 FLUOROSCOPY <1 HR PHYS/QHP: CPT | Performed by: EMERGENCY MEDICINE

## 2018-08-22 PROCEDURE — 71045 X-RAY EXAM CHEST 1 VIEW: CPT | Performed by: EMERGENCY MEDICINE

## 2018-08-22 PROCEDURE — 99283 EMERGENCY DEPT VISIT LOW MDM: CPT | Performed by: EMERGENCY MEDICINE

## 2018-08-22 PROCEDURE — 43752 NASAL/OROGASTRIC W/TUBE PLMT: CPT | Performed by: EMERGENCY MEDICINE

## 2018-08-22 PROCEDURE — 0D20XUZ CHANGE FEEDING DEVICE IN UPPER INTESTINAL TRACT, EXTERNAL APPROACH: ICD-10-PCS | Performed by: RADIOLOGY

## 2018-08-22 PROCEDURE — BD12ZZZ FLUOROSCOPY OF STOMACH: ICD-10-PCS | Performed by: RADIOLOGY

## 2018-08-22 PROCEDURE — 74018 RADEX ABDOMEN 1 VIEW: CPT | Performed by: EMERGENCY MEDICINE

## 2018-08-22 NOTE — ED PROVIDER NOTES
Patient Seen in: [facility] Emergency Department    History   Patient presents with:  Cath Tube Problem (gastrointestinal, urinary, integumentary)    Stated Complaint: ng tube problem, abd pain    HPI    This is a pleasant 11-year-old male presentin date: ANGIOGRAM  2009: CARDIAC DEFIBRILLATOR PLACEMENT      Comment: Neo Orellana, magnet response inhibit                therapy, not pacemaker dependent  2012: CHOLECYSTECTOMY      Comment: Dr. Ankit Tovar  5/7/2015: COLONOSCOPY N/A      Comment: Proced nontender extremity to come cyanosis or edema. ED Course   Labs Reviewed - No data to display    ED Course as of Aug 22 1317  ------------------------------------------------------------      MDM       Feeding tube is in good position.   The patient will

## 2018-08-22 NOTE — ED INITIAL ASSESSMENT (HPI)
Pt to ED from home with c/o right lower abd pain last night, when pt pressed on left side of abdomen felt a pop on the right side and pain subsided.  Pt wife has not been able to auscultate placement of Dobhoff tube since, it was last used at 0100 today

## 2018-08-22 NOTE — ED NOTES
RN took patient to radiology for placement of Dobhoff tube. 10 Mohawk inserted into right nare and secured at 59cm under fluoroscopy guidance.

## 2018-08-22 NOTE — ED INITIAL ASSESSMENT (HPI)
Patient presents with clogged dobhoff tube. This has been a persistent issue for the patient who has repeatedly had it replaced through interventional radiology.

## 2018-08-22 NOTE — ED NOTES
IR called stating they will not place dobhoff tube and to contact xray. Explained situation and arrangements and they have deferred to Dr Jim Pederson. Dr Isela Mccann on phone with Dr Jim Pederson at this time.

## 2018-08-22 NOTE — ED NOTES
Attempted to flush dubhoff with 60cc of water. Unsuccessful attempt. Pulled back approximately 2 ml of water & dried medications. Notified Dr. Alesia Mckinley. Order for placement of Dubhoff under fluoro.

## 2018-08-23 ENCOUNTER — OFFICE VISIT (OUTPATIENT)
Dept: ELECTROPHYSIOLOGY | Facility: HOSPITAL | Age: 63
End: 2018-08-23
Attending: PHYSICIAN ASSISTANT
Payer: COMMERCIAL

## 2018-08-23 ENCOUNTER — APPOINTMENT (OUTPATIENT)
Dept: LAB | Age: 63
End: 2018-08-23
Attending: SURGERY
Payer: COMMERCIAL

## 2018-08-23 ENCOUNTER — PATIENT MESSAGE (OUTPATIENT)
Dept: SURGERY | Facility: CLINIC | Age: 63
End: 2018-08-23

## 2018-08-23 ENCOUNTER — OFFICE VISIT (OUTPATIENT)
Dept: SURGERY | Facility: CLINIC | Age: 63
End: 2018-08-23
Payer: COMMERCIAL

## 2018-08-23 VITALS — HEART RATE: 64 BPM | SYSTOLIC BLOOD PRESSURE: 115 MMHG | DIASTOLIC BLOOD PRESSURE: 68 MMHG

## 2018-08-23 DIAGNOSIS — R63.4 RECENT WEIGHT LOSS: ICD-10-CM

## 2018-08-23 DIAGNOSIS — M21.371 RIGHT FOOT DROP: ICD-10-CM

## 2018-08-23 DIAGNOSIS — R20.0 NUMBNESS AND TINGLING OF RIGHT LOWER EXTREMITY: ICD-10-CM

## 2018-08-23 DIAGNOSIS — E66.09 CLASS 1 OBESITY DUE TO EXCESS CALORIES WITH SERIOUS COMORBIDITY IN ADULT, UNSPECIFIED BMI: ICD-10-CM

## 2018-08-23 DIAGNOSIS — Z98.1 S/P CERVICAL SPINAL FUSION: ICD-10-CM

## 2018-08-23 DIAGNOSIS — G62.9 POLYNEUROPATHY: Primary | ICD-10-CM

## 2018-08-23 DIAGNOSIS — M21.372 ACQUIRED BILATERAL FOOT DROP: Primary | ICD-10-CM

## 2018-08-23 DIAGNOSIS — Z98.84 HX OF BARIATRIC SURGERY: ICD-10-CM

## 2018-08-23 DIAGNOSIS — R20.2 NUMBNESS AND TINGLING OF RIGHT LOWER EXTREMITY: ICD-10-CM

## 2018-08-23 DIAGNOSIS — M21.371 ACQUIRED BILATERAL FOOT DROP: Primary | ICD-10-CM

## 2018-08-23 LAB
ALBUMIN SERPL-MCNC: 3.5 G/DL (ref 3.5–4.8)
ALBUMIN/GLOB SERPL: 1 {RATIO} (ref 1–2)
ALP LIVER SERPL-CCNC: 123 U/L (ref 45–117)
ALT SERPL-CCNC: 55 U/L (ref 17–63)
ANION GAP SERPL CALC-SCNC: 5 MMOL/L (ref 0–18)
AST SERPL-CCNC: 30 U/L (ref 15–41)
BILIRUB SERPL-MCNC: 1 MG/DL (ref 0.1–2)
BUN BLD-MCNC: 16 MG/DL (ref 8–20)
BUN/CREAT SERPL: 25.4 (ref 10–20)
CALCIUM BLD-MCNC: 9 MG/DL (ref 8.3–10.3)
CHLORIDE SERPL-SCNC: 104 MMOL/L (ref 101–111)
CO2 SERPL-SCNC: 30 MMOL/L (ref 22–32)
CREAT BLD-MCNC: 0.63 MG/DL (ref 0.7–1.3)
DEPRECATED HBV CORE AB SER IA-ACNC: 57.7 NG/ML (ref 30–530)
ERYTHROCYTE [DISTWIDTH] IN BLOOD BY AUTOMATED COUNT: 13.4 % (ref 11.5–16)
FOLATE SERPL-MCNC: 23.1 NG/ML (ref 5.9–?)
GLOBULIN PLAS-MCNC: 3.6 G/DL (ref 2.5–4)
GLUCOSE BLD-MCNC: 80 MG/DL (ref 70–99)
HAV AB SERPL IA-ACNC: 730 PG/ML (ref 193–986)
HAV IGM SER QL: 2.1 MG/DL (ref 1.8–2.5)
HCT VFR BLD AUTO: 46.7 % (ref 37–53)
HGB BLD-MCNC: 15.2 G/DL (ref 13–17)
IRON SATURATION: 16 % (ref 20–50)
IRON: 67 UG/DL (ref 45–182)
M PROTEIN MFR SERPL ELPH: 7.1 G/DL (ref 6.1–8.3)
MCH RBC QN AUTO: 30.8 PG (ref 27–33.2)
MCHC RBC AUTO-ENTMCNC: 32.5 G/DL (ref 31–37)
MCV RBC AUTO: 94.7 FL (ref 80–99)
OSMOLALITY SERPL CALC.SUM OF ELEC: 288 MOSM/KG (ref 275–295)
PHOSPHATE SERPL-MCNC: 3.5 MG/DL (ref 2.5–4.9)
PLATELET # BLD AUTO: 175 10(3)UL (ref 150–450)
POTASSIUM SERPL-SCNC: 4.3 MMOL/L (ref 3.6–5.1)
RBC # BLD AUTO: 4.93 X10(6)UL (ref 4.3–5.7)
RED CELL DISTRIBUTION WIDTH-SD: 46.4 FL (ref 35.1–46.3)
SODIUM SERPL-SCNC: 139 MMOL/L (ref 136–144)
TOTAL IRON BINDING CAPACITY: 432 UG/DL (ref 240–450)
TRANSFERRIN SERPL-MCNC: 290 MG/DL (ref 200–360)
VIT D+METAB SERPL-MCNC: 47.4 NG/ML (ref 30–100)
WBC # BLD AUTO: 4.1 X10(3) UL (ref 4–13)

## 2018-08-23 PROCEDURE — 99024 POSTOP FOLLOW-UP VISIT: CPT | Performed by: PHYSICIAN ASSISTANT

## 2018-08-23 PROCEDURE — 83735 ASSAY OF MAGNESIUM: CPT

## 2018-08-23 PROCEDURE — 95909 NRV CNDJ TST 5-6 STUDIES: CPT | Performed by: OTHER

## 2018-08-23 PROCEDURE — 84425 ASSAY OF VITAMIN B-1: CPT

## 2018-08-23 PROCEDURE — 80053 COMPREHEN METABOLIC PANEL: CPT

## 2018-08-23 PROCEDURE — 85027 COMPLETE CBC AUTOMATED: CPT

## 2018-08-23 PROCEDURE — 95886 MUSC TEST DONE W/N TEST COMP: CPT | Performed by: OTHER

## 2018-08-23 PROCEDURE — 82746 ASSAY OF FOLIC ACID SERUM: CPT

## 2018-08-23 PROCEDURE — 83540 ASSAY OF IRON: CPT

## 2018-08-23 PROCEDURE — 83550 IRON BINDING TEST: CPT

## 2018-08-23 PROCEDURE — 84100 ASSAY OF PHOSPHORUS: CPT

## 2018-08-23 PROCEDURE — 82607 VITAMIN B-12: CPT

## 2018-08-23 PROCEDURE — 82306 VITAMIN D 25 HYDROXY: CPT

## 2018-08-23 PROCEDURE — 82728 ASSAY OF FERRITIN: CPT

## 2018-08-23 PROCEDURE — 36415 COLL VENOUS BLD VENIPUNCTURE: CPT

## 2018-08-23 NOTE — PROGRESS NOTES
Pt is here for post op for neck SX. Pt states that he is doing good after surgery. Pt states wearing cervical collar.    SX date 6/27/18

## 2018-08-23 NOTE — TELEPHONE ENCOUNTER
From: Miguel Shannon  To: Amanda Mora PA-C  Sent: 8/23/2018 9:31 AM CDT  Subject: Other    Hi [facility],    I wanted to let you know I have since developed drop foot with my left foot since my last visit. This afternoon is my EMG for the right leg.  I

## 2018-08-23 NOTE — PROGRESS NOTES
Neurosurgery Cervical  Follow up      REASON FOR VISIT: 6 week post op visit      HISTORY OF PRESENT ILLNESS:Favio Rivas is a 60year old male status post ACDF and extension, C3-4, C4-5, C6-7 (6/27/18). Patient goes to [facility] 3x per week.  Swallowing is im Acquired bilateral foot drop  (primary encounter diagnosis)    (Z98.84) Hx of bariatric surgery    (Z98.1) S/P cervical spinal fusion    (R63.4) Recent weight loss      PLAN:  1.  Follow up in 4 weeks or call or follow up sooner for any new, worsening or co

## 2018-08-23 NOTE — PROCEDURES
[facility], [address] [place] [address]      PATIENT'S NAME: Ruiz Dhruv   REFERRING PHYSICIAN:    PATIENT ACCOUNT #: [de-identified] LOCATION: [facility]   MEDICAL RECORD #: [id number] YOB: 1955   DATE OF [place] lumbar paraspinal muscles including L4-L5, L5-S1 paraspinal muscles revealed mild to moderate positive waves and chronic repetitive discharges, more so on the right than the left. IMPRESSION:  This is an abnormal study.   There is a finding of asymmetr

## 2018-08-24 ENCOUNTER — HOSPITAL ENCOUNTER (OUTPATIENT)
Dept: ULTRASOUND IMAGING | Facility: HOSPITAL | Age: 63
Discharge: HOME | End: 2018-08-24
Attending: PHYSICIAN ASSISTANT
Payer: COMMERCIAL

## 2018-08-24 DIAGNOSIS — E04.1 THYROID NODULE: ICD-10-CM

## 2018-08-24 PROCEDURE — 76536 US EXAM OF HEAD AND NECK: CPT | Performed by: PHYSICIAN ASSISTANT

## 2018-08-26 ENCOUNTER — APPOINTMENT (OUTPATIENT)
Dept: GENERAL RADIOLOGY | Facility: HOSPITAL | Age: 63
End: 2018-08-26
Attending: EMERGENCY MEDICINE
Payer: COMMERCIAL

## 2018-08-26 ENCOUNTER — HOSPITAL ENCOUNTER (EMERGENCY)
Facility: HOSPITAL | Age: 63
Discharge: HOME | End: 2018-08-26
Attending: EMERGENCY MEDICINE
Payer: COMMERCIAL

## 2018-08-26 VITALS
TEMPERATURE: 98 F | DIASTOLIC BLOOD PRESSURE: 83 MMHG | HEIGHT: 72 IN | RESPIRATION RATE: 16 BRPM | HEART RATE: 72 BPM | OXYGEN SATURATION: 99 % | BODY MASS INDEX: 22.21 KG/M2 | WEIGHT: 164 LBS | SYSTOLIC BLOOD PRESSURE: 141 MMHG

## 2018-08-26 DIAGNOSIS — Z46.59 ENCOUNTER FOR NASOGASTRIC (NG) TUBE PLACEMENT: ICD-10-CM

## 2018-08-26 DIAGNOSIS — Z01.89 ENCOUNTER FOR IMAGING STUDY TO CONFIRM NASOGASTRIC (NG) TUBE PLACEMENT: Primary | ICD-10-CM

## 2018-08-26 PROCEDURE — 99283 EMERGENCY DEPT VISIT LOW MDM: CPT

## 2018-08-26 PROCEDURE — 43752 NASAL/OROGASTRIC W/TUBE PLMT: CPT | Performed by: EMERGENCY MEDICINE

## 2018-08-26 PROCEDURE — 99282 EMERGENCY DEPT VISIT SF MDM: CPT

## 2018-08-26 PROCEDURE — 76000 FLUOROSCOPY <1 HR PHYS/QHP: CPT | Performed by: EMERGENCY MEDICINE

## 2018-08-26 NOTE — ED PROVIDER NOTES
Patient Seen in: [facility] Emergency Department    History   Patient presents with:  Cath Tube Problem (gastrointestinal, urinary, integumentary)    Stated Complaint: clogged ng tube, changed under floro    HPI    27-year-old male presents emergenc [facility] ENDOSCOPY  No date: GASTRIC BYPASS,OBESITY,SB RECONSTRUC  No date: HERNIA SURGERY  95: KNEE ARTHROSCP HARV  06/27/2018: OTHER      Comment: ANTERIOR CERVICAL DISCECTOMY AND FUSION                EXTENSION.  CERVICAL 3-4, CERVICAL 4-5, CERVICAL murmur rub  Respiratory: Clear to auscultation bilaterally no crackles no wheezes no accessory muscle use  Abdomen: Soft nontender nondistended, no rebound no guarding  no hepatosplenomegaly bowel sounds are present , no pulsatile mass  Extremities: No clu

## 2018-08-26 NOTE — ED INITIAL ASSESSMENT (HPI)
PT TO ED FOR NG REPLACEMENT UNDER FLUORO. PT HAD GASTRIC BYPASS A YEAR AGO. PT HAD SPINAL FUSION 8 WEEKS AGO AND HAS INJURY TO THE VOCAL CORD SO HE'S UNABLE TO SWALLOW.

## 2018-08-27 ENCOUNTER — OFFICE VISIT (OUTPATIENT)
Dept: SPEECH THERAPY | Age: 63
End: 2018-08-27
Attending: FAMILY MEDICINE
Payer: COMMERCIAL

## 2018-08-27 PROCEDURE — 92526 ORAL FUNCTION THERAPY: CPT

## 2018-08-27 NOTE — PROGRESS NOTES
Treatment #7   Treatment Time: 60 minutes  Precautions: aspiration        Charges: 1 billed ([id number])  Pain: 0/10        Diagnosis: Dysphagia R13.12          Subjective: patient was cooperative.  Did therapy today with vitalstim in position 3b at level 12 for

## 2018-08-27 NOTE — ED NOTES
RN accompanied patient out of department for testing. RN to Radiology for Dobhoff placement. RN inserted 10 Bulgarian Dobhoff secured at 58cm, wire removed. Procedure was done under fluoroscopy by Dr. Carrillo Ventura. Dobhoff was flushed by Dr. Carrillo Ventura.  Pt back to room wit

## 2018-08-28 LAB — VITAMIN B1 (THIAMINE), WHOLE B: 130 NMOL/L

## 2018-08-29 ENCOUNTER — OFFICE VISIT (OUTPATIENT)
Dept: SPEECH THERAPY | Age: 63
End: 2018-08-29
Attending: FAMILY MEDICINE
Payer: COMMERCIAL

## 2018-08-29 PROCEDURE — 92526 ORAL FUNCTION THERAPY: CPT

## 2018-08-29 NOTE — PROGRESS NOTES
Treatment #8   Treatment Time: 60 minutes  Precautions: aspiration        Charges: 1 billed ([id number])  Pain: 0/10        Diagnosis: Dysphagia R13.12          Subjective: patient was cooperative.  Did therapy today with vitalstim in position 3a at level 12 for

## 2018-08-30 ENCOUNTER — OFFICE VISIT (OUTPATIENT)
Dept: SPEECH THERAPY | Age: 63
End: 2018-08-30
Attending: PHYSICIAN ASSISTANT
Payer: COMMERCIAL

## 2018-08-30 PROCEDURE — 92526 ORAL FUNCTION THERAPY: CPT

## 2018-08-30 NOTE — PROGRESS NOTES
Treatment #9   Treatment Time: 60 minutes  Precautions: aspiration        Charges: 1 billed ([id number])  Pain: 0/10        Diagnosis: Dysphagia R13.12          Subjective: patient was cooperative.  Did therapy today with vitalstim in position 3b at level 12 for

## 2018-09-04 ENCOUNTER — HOSPITAL ENCOUNTER (OUTPATIENT)
Dept: ULTRASOUND IMAGING | Facility: HOSPITAL | Age: 63
Discharge: HOME | End: 2018-09-04
Attending: OTOLARYNGOLOGY
Payer: COMMERCIAL

## 2018-09-04 ENCOUNTER — OFFICE VISIT (OUTPATIENT)
Dept: SPEECH THERAPY | Age: 63
End: 2018-09-04
Attending: PHYSICIAN ASSISTANT
Payer: COMMERCIAL

## 2018-09-04 DIAGNOSIS — E04.1 THYROID NODULE: ICD-10-CM

## 2018-09-04 PROCEDURE — 92526 ORAL FUNCTION THERAPY: CPT

## 2018-09-04 PROCEDURE — 10022 US FNA THYROID (CPT=10022/76942): CPT | Performed by: OTOLARYNGOLOGY

## 2018-09-04 PROCEDURE — 76942 ECHO GUIDE FOR BIOPSY: CPT | Performed by: OTOLARYNGOLOGY

## 2018-09-04 PROCEDURE — 88173 CYTOPATH EVAL FNA REPORT: CPT | Performed by: OTOLARYNGOLOGY

## 2018-09-04 NOTE — PROGRESS NOTES
Treatment #10   Treatment Time: 60 minutes  Precautions: aspiration        Charges: 1 billed ([id number])  Pain: 0/10        Diagnosis: Dysphagia R13.12          Subjective: patient was cooperative.  Did therapy today with vitalstim in position 3a at level 12 fo

## 2018-09-05 ENCOUNTER — OFFICE VISIT (OUTPATIENT)
Dept: SPEECH THERAPY | Age: 63
End: 2018-09-05
Attending: PHYSICIAN ASSISTANT
Payer: COMMERCIAL

## 2018-09-05 PROCEDURE — 92507 TX SP LANG VOICE COMM INDIV: CPT

## 2018-09-05 NOTE — PROGRESS NOTES
Treatment #11   Treatment Time: 60 minutes  Precautions: aspiration        Charges: 1 billed ([id number])  Pain: 0/10        Diagnosis: Dysphagia R13.12          Subjective: patient was cooperative.  Did therapy today with vitalstim in position 3a at level 13 fo

## 2018-09-06 ENCOUNTER — OFFICE VISIT (OUTPATIENT)
Dept: SPEECH THERAPY | Age: 63
End: 2018-09-06
Attending: PHYSICIAN ASSISTANT
Payer: COMMERCIAL

## 2018-09-06 PROCEDURE — 92526 ORAL FUNCTION THERAPY: CPT

## 2018-09-06 NOTE — PROGRESS NOTES
Treatment #12   Treatment Time: 60 minutes  Precautions: aspiration        Charges: 1 billed ([id number])  Pain: 0/10        Diagnosis: Dysphagia R13.12          Subjective: patient was cooperative.  Did therapy today with vitalstim in position 3a at level 13 fo

## 2018-09-07 ENCOUNTER — OFFICE VISIT (OUTPATIENT)
Dept: SPEECH THERAPY | Age: 63
End: 2018-09-07
Attending: PHYSICIAN ASSISTANT
Payer: COMMERCIAL

## 2018-09-07 PROCEDURE — 92526 ORAL FUNCTION THERAPY: CPT

## 2018-09-07 NOTE — PROGRESS NOTES
Treatment #13   Treatment Time: 60 minutes  Precautions: aspiration        Charges: 1 billed ([id number])  Pain: 0/10        Diagnosis: Dysphagia R13.12          Subjective: patient was cooperative.  Did therapy today with vitalstim in position 3a at level 14 fo

## 2018-09-10 ENCOUNTER — OFFICE VISIT (OUTPATIENT)
Dept: SURGERY | Facility: CLINIC | Age: 63
End: 2018-09-10
Payer: COMMERCIAL

## 2018-09-10 ENCOUNTER — OFFICE VISIT (OUTPATIENT)
Dept: SPEECH THERAPY | Age: 63
End: 2018-09-10
Attending: PHYSICIAN ASSISTANT
Payer: COMMERCIAL

## 2018-09-10 VITALS — WEIGHT: 167.13 LBS | BODY MASS INDEX: 23.4 KG/M2 | HEIGHT: 71 IN

## 2018-09-10 DIAGNOSIS — Z98.84 H/O GASTRIC BYPASS: Primary | ICD-10-CM

## 2018-09-10 PROCEDURE — 97803 MED NUTRITION INDIV SUBSEQ: CPT | Performed by: DIETITIAN, REGISTERED

## 2018-09-10 PROCEDURE — 92526 ORAL FUNCTION THERAPY: CPT

## 2018-09-10 NOTE — PROGRESS NOTES
[facility]  [address] [place] [id number]  Dept: [phone]  Loc: [phone]    09/10/18      Bariatric Follow-up Nutrition Session    Rufina Ware is a 60year old male.     Pt rep Value Date    .0 08/23/2018    .0 08/03/2018    .0 (L) 06/28/2018       Diabetes:    Lab Results   Component Value Date     11/17/2017    A1C 5.3 11/17/2017       Lipid Panel:  Lab Results   Component Value Date    CHOLEST 111 0 Tiotropium Bromide Monohydrate (SPIRIVA HANDIHALER) 18 MCG Inhalation Cap, Inhale 18 mcg into the lungs daily. , Disp: , Rfl:   •  Nebivolol HCl (BYSTOLIC) 5 MG Oral Tab, Take 5 mg by mouth every morning.  , Disp: , Rfl:     Height:  Ht Readings from Last 1 with Speech pathologist for swallowing eval.    Nutrition Diagnosis     Nutrition Diagnosis: H/O gastric bypass; now at healthy weight     Intervention     Recommendations/goals:      -Increase fluid intake, aim for 64 ozs/day  -Caution with processed, ref

## 2018-09-10 NOTE — PROGRESS NOTES
Treatment #14   Treatment Time: 60 minutes  Precautions: aspiration        Charges: 1 billed ([id number])  Pain: 0/10        Diagnosis: Dysphagia R13.12          Subjective: patient was cooperative.  Did therapy today with vitalstim in position 3b at level 14 fo

## 2018-09-12 ENCOUNTER — OFFICE VISIT (OUTPATIENT)
Dept: SPEECH THERAPY | Age: 63
End: 2018-09-12
Attending: PHYSICIAN ASSISTANT
Payer: COMMERCIAL

## 2018-09-12 PROCEDURE — 92526 ORAL FUNCTION THERAPY: CPT

## 2018-09-12 NOTE — PROGRESS NOTES
Treatment #15   Treatment Time: 60 minutes  Precautions: aspiration        Charges: 1 billed ([id number])  Pain: 0/10        Diagnosis: Dysphagia R13.12          Subjective: patient was cooperative.  Did therapy today with vitalstim in position 3b at level 14 fo

## 2018-09-13 ENCOUNTER — OFFICE VISIT (OUTPATIENT)
Dept: SPEECH THERAPY | Age: 63
End: 2018-09-13
Attending: PHYSICIAN ASSISTANT
Payer: COMMERCIAL

## 2018-09-13 ENCOUNTER — TELEPHONE (OUTPATIENT)
Dept: SURGERY | Facility: CLINIC | Age: 63
End: 2018-09-13

## 2018-09-13 DIAGNOSIS — Z98.1 S/P CERVICAL SPINAL FUSION: Primary | ICD-10-CM

## 2018-09-13 DIAGNOSIS — R13.10 DYSPHAGIA, UNSPECIFIED TYPE: ICD-10-CM

## 2018-09-13 PROCEDURE — 92526 ORAL FUNCTION THERAPY: CPT

## 2018-09-13 NOTE — TELEPHONE ENCOUNTER
Speech therapy calling to request Júnior Villa put orders in for a swallow test.  Please call Kathrin Peacock once order is placed

## 2018-09-13 NOTE — TELEPHONE ENCOUNTER
Swallow study order placed. Called [employer] back and provided phone number, [phone]. No answer. Left a message that swallow study was ordered. Advised to please call back with any questions or concerns if needed.

## 2018-09-13 NOTE — PROGRESS NOTES
Treatment #17   Treatment Time: 60 minutes  Precautions: aspiration        Charges: 1 billed ([id number])  Pain: 0/10        Diagnosis: Dysphagia R13.12          Subjective: patient was cooperative.  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

## 2018-09-17 ENCOUNTER — TELEPHONE (OUTPATIENT)
Dept: SURGERY | Facility: CLINIC | Age: 63
End: 2018-09-17

## 2018-09-17 ENCOUNTER — OFFICE VISIT (OUTPATIENT)
Dept: SPEECH THERAPY | Age: 63
End: 2018-09-17
Attending: PHYSICIAN ASSISTANT
Payer: COMMERCIAL

## 2018-09-17 PROCEDURE — 92526 ORAL FUNCTION THERAPY: CPT

## 2018-09-17 NOTE — PROGRESS NOTES
Treatment #18   Treatment Time: 60 minutes  Precautions: aspiration        Charges: 1 billed ([id number])  Pain: 0/10        Diagnosis: Dysphagia R13.12          Subjective: patient was cooperative.  Mally Graham was diagnosed with hurthle cell neoplasm of the thyroid,

## 2018-09-17 NOTE — TELEPHONE ENCOUNTER
Left message on detailed personal voicemail.    Pt needs to get Xray of the cervical completed before appointment date with Dr Arabella Carter on 9/20/18   Pt has fluoroscopy Xray scheduled 9/18/18 he could get Xray of the cervical  with that appointment time and irene

## 2018-09-18 ENCOUNTER — TELEPHONE (OUTPATIENT)
Dept: SURGERY | Facility: CLINIC | Age: 63
End: 2018-09-18

## 2018-09-18 ENCOUNTER — HOSPITAL ENCOUNTER (OUTPATIENT)
Dept: GENERAL RADIOLOGY | Facility: HOSPITAL | Age: 63
Discharge: HOME | End: 2018-09-18
Attending: PHYSICIAN ASSISTANT
Payer: COMMERCIAL

## 2018-09-18 DIAGNOSIS — R13.10 DYSPHAGIA, UNSPECIFIED TYPE: ICD-10-CM

## 2018-09-18 DIAGNOSIS — Z98.1 S/P CERVICAL SPINAL FUSION: Primary | ICD-10-CM

## 2018-09-18 DIAGNOSIS — Z98.1 S/P CERVICAL SPINAL FUSION: ICD-10-CM

## 2018-09-18 DIAGNOSIS — Z98.1 STATUS POST LUMBAR SPINAL FUSION: ICD-10-CM

## 2018-09-18 PROCEDURE — 74230 X-RAY XM SWLNG FUNCJ C+: CPT | Performed by: PHYSICIAN ASSISTANT

## 2018-09-18 PROCEDURE — 72050 X-RAY EXAM NECK SPINE 4/5VWS: CPT | Performed by: PHYSICIAN ASSISTANT

## 2018-09-18 PROCEDURE — 92611 MOTION FLUOROSCOPY/SWALLOW: CPT

## 2018-09-18 NOTE — TELEPHONE ENCOUNTER
Spoke with speech pathologist Kera Crawford who wanted to verify patient's neck movements prior to the video swallow today at 9:30 am.    Informed Kera Crawford I would reach out with TIERRA Hills to discuss this and get back to him.  May reach Kera Crawford at phone #: [phone]

## 2018-09-18 NOTE — PROGRESS NOTES
ADULT VIDEOFLUOROSCOPIC SWALLOWING STUDY    Admission Date: 9/18/2018  Evaluation Date: 09/18/18  Radiologist: Dr. Jacqueline Greer: Regular(as tolerated)  Diet Recommendations - Liquid:  Thin    Further Follow-up: V-tach [facility])    • Visual impairment     glasses   • Wears glasses    • Weight loss    • Wheezing        Current Diet Consistency: Thin liquids; Nectar thick liquids(soft diet consistency)           Precautions: Aspiration  Imaging results: No recent imaging Location: Mild/Mod;Valleculae  Cleared/Reduced with: Secondary swallow  Laryngeal Penetration: None  Tracheal Aspiration: None  Strategy(ies) Implemented (Hard Solid):  Slow rate;Small bites and sips;Multiple swallows  Effectiveness: Yes  Penetration Aspira

## 2018-09-18 NOTE — TELEPHONE ENCOUNTER
Per radiology swallow study order needs to be enteredas xray video swallow. New order placed. Nothing further needed.

## 2018-09-19 ENCOUNTER — OFFICE VISIT (OUTPATIENT)
Dept: SPEECH THERAPY | Age: 63
End: 2018-09-19
Attending: PHYSICIAN ASSISTANT
Payer: COMMERCIAL

## 2018-09-19 PROCEDURE — 92526 ORAL FUNCTION THERAPY: CPT

## 2018-09-19 NOTE — PROGRESS NOTES
Treatment #19   Treatment Time: 60 minutes  Precautions: aspiration        Charges: 1 billed ([id number])  Pain: 0/10        Diagnosis: Dysphagia R13.12          Subjective: patient was cooperative.  VFSS was completed yesterday and we reviewed the results and e

## 2018-09-20 ENCOUNTER — HOSPITAL ENCOUNTER (OUTPATIENT)
Dept: CARDIOLOGY CLINIC | Facility: HOSPITAL | Age: 63
Discharge: HOME | End: 2018-09-20
Attending: INTERNAL MEDICINE

## 2018-09-20 ENCOUNTER — APPOINTMENT (OUTPATIENT)
Dept: SPEECH THERAPY | Age: 63
End: 2018-09-20
Attending: PHYSICIAN ASSISTANT
Payer: COMMERCIAL

## 2018-09-20 ENCOUNTER — PRIOR ORIGINAL RECORDS (OUTPATIENT)
Dept: OTHER | Age: 63
End: 2018-09-20

## 2018-09-20 ENCOUNTER — OFFICE VISIT (OUTPATIENT)
Dept: SURGERY | Facility: CLINIC | Age: 63
End: 2018-09-20
Payer: COMMERCIAL

## 2018-09-20 VITALS — SYSTOLIC BLOOD PRESSURE: 90 MMHG | HEART RATE: 80 BPM | DIASTOLIC BLOOD PRESSURE: 60 MMHG

## 2018-09-20 DIAGNOSIS — Z98.1 S/P CERVICAL SPINAL FUSION: ICD-10-CM

## 2018-09-20 DIAGNOSIS — R13.10 DYSPHAGIA, UNSPECIFIED TYPE: Primary | ICD-10-CM

## 2018-09-20 DIAGNOSIS — I65.23 BILATERAL CAROTID ARTERY STENOSIS: ICD-10-CM

## 2018-09-20 DIAGNOSIS — I25.10 CORONARY ARTERIOSCLEROSIS: ICD-10-CM

## 2018-09-20 DIAGNOSIS — M50.00 CERVICAL DISC DISORDER WITH MYELOPATHY: ICD-10-CM

## 2018-09-20 PROCEDURE — 99024 POSTOP FOLLOW-UP VISIT: CPT | Performed by: PHYSICIAN ASSISTANT

## 2018-09-20 RX ORDER — METHYLPREDNISOLONE 4 MG/1
TABLET ORAL
Qty: 1 PACKAGE | Refills: 0 | Status: SHIPPED | OUTPATIENT
Start: 2018-09-20 | End: 2018-09-27 | Stop reason: ALTCHOICE

## 2018-09-20 NOTE — PROGRESS NOTES
Pt had cervical surgery ACDF C3-7 6/27/2018, had swallow study, recently as well as Xray. EMG done also  Drop foot still in both feet.    Pain in the upper area of the neck and in the middle of shoulder blades, stabbing pain 4/10

## 2018-09-20 NOTE — PROGRESS NOTES
[facility] Neurosurgery eval      HISTORY OF PRESENT ILLNESS:Favio Barraza is a 60year old Rh male in follow-up after having fusion extension C3-4 C4-5 and C6-7 by Dr. Lara Duncan 6/29/18. Patient returns today in follow-up.   He had good strength in his lower e He complains of cervical pain which is a 3-4/10. Complains of left greater than right scapular pain. He has thoracic tightness and lumbar tightness. He has left greater than right shoulder pain. He has left C6 radiculopathy which is a 5-7/10.   He has n No date: Morbid obesity with BMI of 40.0-44.9, [facility]  No date: Muscle weakness  9/8/2015: Nocturia  No date: Nose disorder  No date: Osteoarthritis  No date: Osteoporosis  No date:  Other and unspecified hyperlipidemia  No date: Pacemaker  No date: Pr 12/17/2015: HIP JOINT INJECTION;  Left      Comment:  Performed by Bushra Piper MD at [address] [facility] OR  12/10/2015: [place]; Right      Comment:  Performed by Bushra Piper MD at [address] [place] [address] Dr  95: KNEE ARTHROSCP DORY Ballesteros 20  06/27/2018: OTHER      Com Mexiletine HCl 150 MG Oral Cap Take 150 mg by mouth 2 (two) times daily. Disp:  Rfl:    acetaminophen 500 MG Oral Tab Take 1,000 mg by mouth every 6 (six) hours as needed for Pain. Disp:  Rfl:    NASCOBAL 500 MCG/0.1ML Nasal Solution every 7 days.    Disp: Upper extremity strength: Internal rotation left is 4- right is 4.       Deltoid    Triceps     Biceps        Wrist Extension  Finger extension Thumb Abduction  Thumb adduction Intrinsics   Right         4        5         4+          5 5 4 4+ 5 4+   Le 8.  Left vocal cord paresis  9. Upper and lower extremity weakness with probable myelopathy  10 possible peripheral nerve issues in both lower extremities    PLAN:  1. Medrol Dosepak  2. Return to the hard cervical collar  3.   Follow-up next Thursday mo

## 2018-09-24 ENCOUNTER — APPOINTMENT (OUTPATIENT)
Dept: SPEECH THERAPY | Age: 63
End: 2018-09-24
Payer: COMMERCIAL

## 2018-09-26 ENCOUNTER — APPOINTMENT (OUTPATIENT)
Dept: SPEECH THERAPY | Age: 63
End: 2018-09-26
Attending: PHYSICIAN ASSISTANT
Payer: COMMERCIAL

## 2018-09-27 ENCOUNTER — APPOINTMENT (OUTPATIENT)
Dept: SPEECH THERAPY | Age: 63
End: 2018-09-27
Attending: PHYSICIAN ASSISTANT
Payer: COMMERCIAL

## 2018-09-27 ENCOUNTER — OFFICE VISIT (OUTPATIENT)
Dept: SURGERY | Facility: CLINIC | Age: 63
End: 2018-09-27

## 2018-09-27 VITALS — SYSTOLIC BLOOD PRESSURE: 100 MMHG | DIASTOLIC BLOOD PRESSURE: 60 MMHG | HEART RATE: 78 BPM

## 2018-09-27 DIAGNOSIS — R13.10 DYSPHAGIA, UNSPECIFIED TYPE: Primary | ICD-10-CM

## 2018-09-27 DIAGNOSIS — R29.898 WEAKNESS OF BOTH UPPER EXTREMITIES: ICD-10-CM

## 2018-09-27 DIAGNOSIS — M50.00 CERVICAL DISC DISORDER WITH MYELOPATHY: ICD-10-CM

## 2018-09-27 DIAGNOSIS — Z98.1 S/P CERVICAL SPINAL FUSION: ICD-10-CM

## 2018-09-27 DIAGNOSIS — R29.898 WEAKNESS OF BOTH LOWER EXTREMITIES: ICD-10-CM

## 2018-09-27 PROCEDURE — 99024 POSTOP FOLLOW-UP VISIT: CPT | Performed by: PHYSICIAN ASSISTANT

## 2018-09-27 RX ORDER — METHYLPREDNISOLONE 4 MG/1
4 TABLET ORAL 2 TIMES DAILY
Qty: 60 TABLET | Refills: 0 | Status: SHIPPED | OUTPATIENT
Start: 2018-09-27 | End: 2018-11-13 | Stop reason: ALTCHOICE

## 2018-09-27 NOTE — PROGRESS NOTES
[facility] Neurosurgery eval      HISTORY OF PRESENT ILLNESS:Favio Romero is a 60year old RH male in follow-up after having fusion extension C3-4 C4-5 and C6-7 by Dr. Reg Borges 6/29/18. Medrol help heaviness in legs. CS pain at C7/T1.  Mobility is slightly be He complains of cervical pain which is a 3-4/10. Complains of left greater than right scapular pain. He has thoracic tightness and lumbar tightness. He has left greater than right shoulder pain. He has left C6 radiculopathy which is a 5-7/10.   He has n • Nose disorder    • Osteoarthritis    • Osteoporosis    • Other and unspecified hyperlipidemia    • Pacemaker    • Problems with swallowing    • Prostate cancer ([facility] 10/20/2016   • Pulmonary embolism ([facility] 2014    post-op bicep tendon repair   • Unspecif 06/27/2018: OTHER      Comment:  ANTERIOR CERVICAL DISCECTOMY AND FUSION EXTENSION. CERVICAL 3-4, CERVICAL 4-5, CERVICAL 6-7.                 MICRODISCECTOMY, INTERBODY FUSION WITH PLATE AND                ALLOGRAFT SCREWS N/A   7/07: OTHER S Montelukast Sodium (SINGULAIR) 10 MG Oral Tab Take 10 mg by mouth nightly. Disp:  Rfl:    Rivaroxaban (XARELTO) 20 MG Oral Tab Take 20 mg by mouth daily with food.  Disp:  Rfl:    betamethasone dipropionate 0.05 % External Cream Apply 1 Application topicall Iliopsoas  Hamstrings   Quads    D-flexion P-flexion Eversion   Right       4+         5       5         1 5 3   Left       4         5       5         1 5 3     Reflexes DTRs: 1/4 in upper and lower extremities    IMAGING:  X-rays of cervical spine 9/18 5.  Consider: Methylprednisolone 4 mg BID if needed. Dr Noy Crespo evaluated the patient and is in agreement.       Leonardo Easley M.S., PA-C  [place] [id number] [facility],  Sonal Castellanos, [address] [place] [address] Rd  [phone]

## 2018-10-01 ENCOUNTER — PRIOR ORIGINAL RECORDS (OUTPATIENT)
Dept: OTHER | Age: 63
End: 2018-10-01

## 2018-10-02 ENCOUNTER — PRIOR ORIGINAL RECORDS (OUTPATIENT)
Dept: OTHER | Age: 63
End: 2018-10-02

## 2018-10-09 ENCOUNTER — OFFICE VISIT (OUTPATIENT)
Dept: NEUROLOGY | Facility: CLINIC | Age: 63
End: 2018-10-09
Payer: COMMERCIAL

## 2018-10-09 ENCOUNTER — LAB ENCOUNTER (OUTPATIENT)
Dept: LAB | Age: 63
End: 2018-10-09
Attending: Other
Payer: COMMERCIAL

## 2018-10-09 VITALS
DIASTOLIC BLOOD PRESSURE: 70 MMHG | RESPIRATION RATE: 14 BRPM | WEIGHT: 165 LBS | SYSTOLIC BLOOD PRESSURE: 118 MMHG | HEART RATE: 68 BPM | BODY MASS INDEX: 23 KG/M2

## 2018-10-09 DIAGNOSIS — G62.9 POLYNEUROPATHY: Primary | ICD-10-CM

## 2018-10-09 DIAGNOSIS — G62.9 POLYNEUROPATHY: ICD-10-CM

## 2018-10-09 DIAGNOSIS — E04.1 THYROID NODULE: ICD-10-CM

## 2018-10-09 PROCEDURE — 84165 PROTEIN E-PHORESIS SERUM: CPT

## 2018-10-09 PROCEDURE — 82164 ANGIOTENSIN I ENZYME TEST: CPT

## 2018-10-09 PROCEDURE — 83883 ASSAY NEPHELOMETRY NOT SPEC: CPT

## 2018-10-09 PROCEDURE — 86038 ANTINUCLEAR ANTIBODIES: CPT

## 2018-10-09 PROCEDURE — 85652 RBC SED RATE AUTOMATED: CPT

## 2018-10-09 PROCEDURE — 86334 IMMUNOFIX E-PHORESIS SERUM: CPT

## 2018-10-09 PROCEDURE — 36415 COLL VENOUS BLD VENIPUNCTURE: CPT

## 2018-10-09 PROCEDURE — 83520 IMMUNOASSAY QUANT NOS NONAB: CPT

## 2018-10-09 PROCEDURE — 99214 OFFICE O/P EST MOD 30 MIN: CPT | Performed by: OTHER

## 2018-10-09 PROCEDURE — 83970 ASSAY OF PARATHORMONE: CPT

## 2018-10-09 NOTE — PATIENT INSTRUCTIONS
Refill policies:    • Allow 2-3 business days for refills; controlled substances may take longer.   • Contact your pharmacy at least 5 days prior to running out of medication and have them send an electronic request or submit request through the “request re entire amount billed. Precertification and Prior Authorizations: If your physician has recommended that you have a procedure or additional testing performed.   [facility]) will contact your insurance carrier to obtain pre-certi

## 2018-10-09 NOTE — PROGRESS NOTES
The patient was referred by Harvinder Wilhelm for neuropathy. The patient is having neuropathy in his legs since July 2018.

## 2018-10-09 NOTE — H&P
Neurology H&P    Francesco Brand Patient Status:  No patient class for patient encounter    10/12/1955 MRN [id number]   Location [address] [place] [address], [address] [place] [address], [address] [place] [address] Attending No att. providers found   Hosp Day # 0 PCP [facility],  store etc.      He also tells me that that he has been having frequent headaches. He tells me that he has been getting headaches since he had a bariatric surgery last June 2017. He states that he only gets headaches when he eats too much.  He states that he ambulation. He has had one fall in August due to catching his toe on something. Current Medications:    Current Outpatient Medications:  methylPREDNISolone 4 MG Oral Tab Take 1 tablet (4 mg total) by mouth 2 (two) times daily.  Disp: 60 tablet Rfl: 0 biceps tendon     Annual physical exam     Screening for prostate cancer     Tinea pedis     Tinea corporis     Chest pain     Coronary artery disease involving native heart without angina pectoris     ICD (implantable cardioverter-defibrillator) in place • Problems with swallowing    • [facility] 10/20/2016   • Pulmonary embolism ([facility] 2014    post-op bicep tendon repair   • Unspecified essential hypertension    • Unspecified sleep apnea     cpap   • V-tach [facility])    • Visual impairment     gla 11/17/2015    Performed by Dangelo Lane MD at [facility] MAIN OR   • SHOULDER ARTHROSCOPY Right 2014    AT [address],    • SPINE SURGERY PROCEDURE UNLISTED  03/2011    CERVICAL FUSION/HARDWARE   • STELLATE GANGLION BLOCK Left 1/7/2016 rate and rhythm, no murmur  Pulm: CTAB  GI: non-tender, normal bowel sounds  Skin: normal, dry  Extremities: No clubbing or cyanosis      Neurologic:   MENTAL STATUS: alert, ox3, normal attention, language and fund of knowledge.       CRANIAL NERVES II to X sensory response was also seen. 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 Further neurological evaluation is indicated.   There is also evidence of bilateral chronic lumbo

## 2018-10-10 NOTE — PROGRESS NOTES
pth is slightly elevated at 86.5 recommend repeat ca/pth in December prior to follow up visit in january

## 2018-10-11 ENCOUNTER — TELEPHONE (OUTPATIENT)
Dept: SURGERY | Facility: CLINIC | Age: 63
End: 2018-10-11

## 2018-10-11 ENCOUNTER — OFFICE VISIT (OUTPATIENT)
Dept: SURGERY | Facility: CLINIC | Age: 63
End: 2018-10-11
Payer: COMMERCIAL

## 2018-10-11 VITALS — HEART RATE: 74 BPM | DIASTOLIC BLOOD PRESSURE: 68 MMHG | SYSTOLIC BLOOD PRESSURE: 126 MMHG

## 2018-10-11 DIAGNOSIS — R29.898 WEAKNESS OF BOTH LOWER EXTREMITIES: ICD-10-CM

## 2018-10-11 DIAGNOSIS — M21.371 ACQUIRED BILATERAL FOOT DROP: ICD-10-CM

## 2018-10-11 DIAGNOSIS — R29.898 WEAKNESS OF BOTH UPPER EXTREMITIES: ICD-10-CM

## 2018-10-11 DIAGNOSIS — G62.9 POLYNEUROPATHY: Primary | ICD-10-CM

## 2018-10-11 DIAGNOSIS — R13.10 DYSPHAGIA, UNSPECIFIED TYPE: ICD-10-CM

## 2018-10-11 DIAGNOSIS — Z98.1 S/P CERVICAL SPINAL FUSION: ICD-10-CM

## 2018-10-11 DIAGNOSIS — M21.372 ACQUIRED BILATERAL FOOT DROP: ICD-10-CM

## 2018-10-11 PROCEDURE — 99213 OFFICE O/P EST LOW 20 MIN: CPT | Performed by: PHYSICIAN ASSISTANT

## 2018-10-11 NOTE — PROGRESS NOTES
[facility] Neurosurgery eval      HISTORY OF PRESENT ILLNESS:Favio Finn is a 60year old RH male in follow-up after having fusion extension C3-4 C4-5 and C6-7 by Dr. Marcell Herndon 6/29/18. Did not take methylprednisolone.  Feels he is stable but has not regresse He complains of cervical pain which is a 3-4/10. Complains of left greater than right scapular pain. He has thoracic tightness and lumbar tightness. He has left greater than right shoulder pain. He has left C6 radiculopathy which is a 5-7/10.   He has n • Nose disorder    • Osteoarthritis    • Osteoporosis    • Other and unspecified hyperlipidemia    • Pacemaker    • Problems with swallowing    • Prostate cancer ([facility] 10/20/2016   • Pulmonary embolism ([facility] 2014    post-op bicep tendon repair   • Unspecif ANTERIOR CERVICAL DISCECTOMY AND FUSION EXTENSION. CERVICAL 3-4, CERVICAL 4-5, CERVICAL 6-7.  MICRODISCECTOMY, INTERBODY FUSION WITH PLATE AND ALLOGRAFT SCREWS N/A    • OTHER SURGICAL HISTORY  7/07    FOOT SX   • SACROILIAC JOINT INJECTION BILATERAL Bilate Montelukast Sodium (SINGULAIR) 10 MG Oral Tab Take 10 mg by mouth nightly. Disp:  Rfl:    Rivaroxaban (XARELTO) 20 MG Oral Tab Take 20 mg by mouth daily with food.  Disp:  Rfl:    betamethasone dipropionate 0.05 % External Cream Apply 1 Application topicall Right       4+         5       5         2 5 3   Left       4         5       5         2 5 2     Reflexes DTRs: 1/4 in upper and lower extremities    EMG   IMPRESSION:  This is an abnormal study. Jerona Prader is a finding of asymmetrical sensorimotor neuropathy 2.  C3-4 C4-5 C6-7 fusion extension 6/29/18, with probable instability and pseudoarthrosis at C4-5  3. Upper and lower extremity weakness  4. Gait dysfunction  5. Lumbar stenosis  6. Cardiac pacemaker and on Xarelto  7.   Difficulty swallowing and posto

## 2018-10-11 NOTE — PATIENT INSTRUCTIONS
Refill policies:    • Allow 2-3 business days for refills; controlled substances may take longer.   • Contact your pharmacy at least 5 days prior to running out of medication and have them send an electronic request or submit request through the “request re entire amount billed. Precertification and Prior Authorizations: If your physician has recommended that you have a procedure or additional testing performed.   [facility]) will contact your insurance carrier to obtain pre-certi myelogram (Dr Lynette Gatica called and will coordinate)  •

## 2018-10-11 NOTE — PROGRESS NOTES
Pt is here for post op for the cervical.  Pt states that he is still the same since the last time we seen him. Pt states that he obtained EMG.

## 2018-10-11 NOTE — TELEPHONE ENCOUNTER
Pt directed to FU /Dr. Crys PACK post procedure on 10/25, please advise where to schedule; pt will need call back

## 2018-10-12 ENCOUNTER — TELEPHONE (OUTPATIENT)
Dept: NEUROLOGY | Facility: CLINIC | Age: 63
End: 2018-10-12

## 2018-10-12 NOTE — TELEPHONE ENCOUNTER
I called Mr. Sánchez Pesarah on the phone regarding test results today. No clear etiology on labs sent for his neuropathy. I will order LP to be done on same day as his previously scheduled myelogram (Oct. 25th 2018) He verbalized understanding.

## 2018-10-15 ENCOUNTER — MYAURORA ACCOUNT LINK (OUTPATIENT)
Dept: OTHER | Age: 63
End: 2018-10-15

## 2018-10-15 ENCOUNTER — PRIOR ORIGINAL RECORDS (OUTPATIENT)
Dept: OTHER | Age: 63
End: 2018-10-15

## 2018-10-16 ENCOUNTER — PRIOR ORIGINAL RECORDS (OUTPATIENT)
Dept: OTHER | Age: 63
End: 2018-10-16

## 2018-10-18 ENCOUNTER — MYAURORA ACCOUNT LINK (OUTPATIENT)
Dept: OTHER | Age: 63
End: 2018-10-18

## 2018-10-18 NOTE — IMAGING NOTE
Spoke with pt concerning upcoming LP/Myelogram scheduled for 10/25. Pt states he had myelogram prior to his surgery and was off xarelto for 3 days.  I related our protocol was hold for 2 days and that he should get clarification from his cardiologist. Pt ve

## 2018-10-25 ENCOUNTER — HOSPITAL ENCOUNTER (OUTPATIENT)
Dept: GENERAL RADIOLOGY | Facility: HOSPITAL | Age: 63
Discharge: HOME | End: 2018-10-25
Attending: PHYSICIAN ASSISTANT
Payer: COMMERCIAL

## 2018-10-25 ENCOUNTER — LAB ENCOUNTER (OUTPATIENT)
Dept: LAB | Facility: HOSPITAL | Age: 63
End: 2018-10-25
Attending: PHYSICIAN ASSISTANT
Payer: COMMERCIAL

## 2018-10-25 ENCOUNTER — HOSPITAL ENCOUNTER (OUTPATIENT)
Dept: CT IMAGING | Facility: HOSPITAL | Age: 63
Discharge: HOME | End: 2018-10-25
Attending: PHYSICIAN ASSISTANT
Payer: COMMERCIAL

## 2018-10-25 VITALS
RESPIRATION RATE: 16 BRPM | SYSTOLIC BLOOD PRESSURE: 106 MMHG | OXYGEN SATURATION: 97 % | DIASTOLIC BLOOD PRESSURE: 73 MMHG | TEMPERATURE: 98 F | HEART RATE: 69 BPM

## 2018-10-25 VITALS
WEIGHT: 166 LBS | TEMPERATURE: 98 F | HEART RATE: 61 BPM | SYSTOLIC BLOOD PRESSURE: 118 MMHG | BODY MASS INDEX: 22.48 KG/M2 | DIASTOLIC BLOOD PRESSURE: 65 MMHG | OXYGEN SATURATION: 98 % | RESPIRATION RATE: 18 BRPM | HEIGHT: 72 IN

## 2018-10-25 DIAGNOSIS — R29.898 WEAKNESS OF BOTH UPPER EXTREMITIES: ICD-10-CM

## 2018-10-25 DIAGNOSIS — Z98.1 S/P CERVICAL SPINAL FUSION: ICD-10-CM

## 2018-10-25 DIAGNOSIS — M50.00 CERVICAL DISC DISORDER WITH MYELOPATHY: ICD-10-CM

## 2018-10-25 DIAGNOSIS — G62.9 POLYNEUROPATHY: ICD-10-CM

## 2018-10-25 DIAGNOSIS — M50.00 CERVICAL DISC DISORDER WITH MYELOPATHY: Primary | ICD-10-CM

## 2018-10-25 DIAGNOSIS — R29.898 WEAKNESS OF BOTH LOWER EXTREMITIES: ICD-10-CM

## 2018-10-25 PROCEDURE — 89050 BODY FLUID CELL COUNT: CPT

## 2018-10-25 PROCEDURE — 82784 ASSAY IGA/IGD/IGG/IGM EACH: CPT

## 2018-10-25 PROCEDURE — 82164 ANGIOTENSIN I ENZYME TEST: CPT

## 2018-10-25 PROCEDURE — 72126 CT NECK SPINE W/DYE: CPT | Performed by: PHYSICIAN ASSISTANT

## 2018-10-25 PROCEDURE — 82945 GLUCOSE OTHER FLUID: CPT

## 2018-10-25 PROCEDURE — 36415 COLL VENOUS BLD VENIPUNCTURE: CPT

## 2018-10-25 PROCEDURE — 84157 ASSAY OF PROTEIN OTHER: CPT

## 2018-10-25 PROCEDURE — 85610 PROTHROMBIN TIME: CPT

## 2018-10-25 PROCEDURE — 82042 OTHER SOURCE ALBUMIN QUAN EA: CPT

## 2018-10-25 PROCEDURE — 85049 AUTOMATED PLATELET COUNT: CPT | Performed by: RADIOLOGY

## 2018-10-25 PROCEDURE — 62302 MYELOGRAPHY LUMBAR INJECTION: CPT | Performed by: PHYSICIAN ASSISTANT

## 2018-10-25 RX ORDER — ACETAMINOPHEN 500 MG
1000 TABLET ORAL ONCE
Status: COMPLETED | OUTPATIENT
Start: 2018-10-25 | End: 2018-10-25

## 2018-10-25 RX ORDER — DIAZEPAM 5 MG/1
TABLET ORAL
Status: COMPLETED
Start: 2018-10-25 | End: 2018-10-25

## 2018-10-25 RX ORDER — DIAZEPAM 5 MG/1
10 TABLET ORAL
Status: COMPLETED | OUTPATIENT
Start: 2018-10-25 | End: 2018-10-25

## 2018-10-25 RX ADMIN — DIAZEPAM: 5 TABLET ORAL at 07:23:00

## 2018-10-25 RX ADMIN — ACETAMINOPHEN 1000 MG: 500 MG TABLET ORAL at 10:24:00

## 2018-10-25 NOTE — IMAGING NOTE
Pt scheduled for LP and myelogram this am. Valium given per orders and wife to drive home. Report called to Elvia Vu in [facility].

## 2018-10-25 NOTE — PROCEDURES
[facility]  Procedure Note    Weston Elizondo Patient Status:  Outpatient    10/12/1955 MRN [id number]   Location  [facility] Attending TIERRA Clemens   Hosp Day # 0 [facility] [address] [facility]     Procedure: lumbar punctur

## 2018-10-31 ENCOUNTER — TELEPHONE (OUTPATIENT)
Dept: NEUROLOGY | Facility: CLINIC | Age: 63
End: 2018-10-31

## 2018-10-31 NOTE — TELEPHONE ENCOUNTER
----- Message from Naseem Rangel DO sent at 10/30/2018 10:32 AM CDT -----  CSF studies only showed very milldly elevated protein but all other studies were normal.

## 2018-11-06 ENCOUNTER — OFFICE VISIT (OUTPATIENT)
Dept: SURGERY | Facility: CLINIC | Age: 63
End: 2018-11-06
Payer: COMMERCIAL

## 2018-11-06 VITALS
WEIGHT: 167.81 LBS | SYSTOLIC BLOOD PRESSURE: 99 MMHG | BODY MASS INDEX: 23.49 KG/M2 | HEART RATE: 71 BPM | HEIGHT: 71 IN | DIASTOLIC BLOOD PRESSURE: 66 MMHG | RESPIRATION RATE: 16 BRPM

## 2018-11-06 NOTE — PROGRESS NOTES
[address], [address] [address]  Dept: [phone]    11/6/2018    BARIATRIC EXISTING PATIENT/FOLLOW UP    HPI:  Carrillo Arellano is a 58year old-year old male who pr

## 2018-11-07 ENCOUNTER — TELEPHONE (OUTPATIENT)
Dept: SURGERY | Facility: CLINIC | Age: 63
End: 2018-11-07

## 2018-11-08 ENCOUNTER — TELEPHONE (OUTPATIENT)
Dept: SURGERY | Facility: CLINIC | Age: 63
End: 2018-11-08

## 2018-11-13 ENCOUNTER — OFFICE VISIT (OUTPATIENT)
Dept: NEUROLOGY | Facility: CLINIC | Age: 63
End: 2018-11-13
Payer: COMMERCIAL

## 2018-11-13 VITALS
DIASTOLIC BLOOD PRESSURE: 72 MMHG | HEART RATE: 72 BPM | BODY MASS INDEX: 25 KG/M2 | RESPIRATION RATE: 14 BRPM | SYSTOLIC BLOOD PRESSURE: 122 MMHG | WEIGHT: 176 LBS

## 2018-11-13 DIAGNOSIS — G62.9 POLYNEUROPATHY: Primary | ICD-10-CM

## 2018-11-13 PROCEDURE — 99213 OFFICE O/P EST LOW 20 MIN: CPT | Performed by: OTHER

## 2018-11-13 NOTE — PROGRESS NOTES
Neurology H&P    Danilo Jesusita Patient Status:  No patient class for patient encounter    10/12/1955 MRN [id number]   Location ED [facility], [address], [address] [facility] Attending No att. providers found   Hosp Day # 0 PCP [facility], DO store etc.      He also tells me that that he has been having frequent headaches. He tells me that he has been getting headaches since he had a bariatric surgery last June 2017. He states that he only gets headaches when he eats too much.  He states that he Nasal Solution every 7 days. Disp:  Rfl:    Montelukast Sodium (SINGULAIR) 10 MG Oral Tab Take 10 mg by mouth nightly. Disp:  Rfl:    Rivaroxaban (XARELTO) 20 MG Oral Tab Take 20 mg by mouth daily with food.  Disp:  Rfl:    betamethasone dipropionate 0.05 Tension type headache     Overweight (BMI 25.0-29. 9)     Memory loss     Orthostatic lightheadedness     History of pulmonary embolism     Cervical disc disorder     COPD exacerbation (HCC)     Dysphagia     Cervical spondylosis with myelopathy     Polyne • BACK SURGERY     • BARIATRIC GASTRIC BYPASS LAPAROSCOPIC AMALIA-EN-Y N/A 8/14/2017    Performed by Kiko Alexandre MD at [facility]  2009    [place] [employer], magnet response inhibit therapy, not pacemaker dependen Surgeon: Minor Seth MD;  Location: [facility] MAIN OR       SocHx:  Social History    Tobacco Use      Smoking status: Never Smoker      Smokeless tobacco: Never Used    Alcohol use: Yes      Comment: occasionally    Drug use: No      Family History:  Family fifi.    MOTOR EXAMINATION:   MSK:  RUE: Delt:  5/5, Bi: 5/5, Tri: 5/5, : 5/5, interossei: 5/5, APB: 5/5, FE: 5/5  LUE: Delt:  5/5, Bi: 5/5, Tri: 5/5, : 5/5, interossei: 5/5, APB: 4/5, FE: 4+/5  RLE: HF: 5/5, KE: 5/5, KF: 5/5, DF: 3/5, PF: 4+/5, H 0 - 50     GM1 ANTIBODIES, IGG/IGM      0 - 50 IV     GM2 ANTIBODIES, IGG/IGM      0 - 50 IV     GD1A ANTIBODIES, IGG/IGM      0 - 50 IV     GD1B ANTIBODIES, IGG/IGM      0 - 50 IV     GQ1B ANTIBODIES, IGG/IGM      0 - 50 IV     TOTAL VOLUME CSF      mL Negative   ANGIOTENSIN CONVERTING ENZYME,      9 - 67 U/L 22   SED RATE      0 - 12 mm/Hr 6   ANGIOTENSIN CONVERTING E, CSF      0.0 - 2.5 U/L    TOTAL PROTEIN CSF      15.0 - 45.0 mg/dL    GLUCOSE CSF      40 - 70 mg/dL      Component      Latest Ref Rng Given this finding, demyelinating neuropathy or mononeuritis multiplex multifocal motor neuropathy is the main consideration. Further neurological evaluation is indicated.   There is also evidence of bilateral chronic lumbosacral radiculopathy at L5-S1 le

## 2018-11-13 NOTE — PATIENT INSTRUCTIONS
Refill policies:    • Allow 2-3 business days for refills; controlled substances may take longer.   • Contact your pharmacy at least 5 days prior to running out of medication and have them send an electronic request or submit request through the “request re entire amount billed. Precertification and Prior Authorizations: If your physician has recommended that you have a procedure or additional testing performed.   [facility]) will contact your insurance carrier to obtain pre-certi

## 2018-11-13 NOTE — PROGRESS NOTES
The patient states he still has dizziness while bending over and standing up. The patient complain about on and off headaches. The patient states numbness and tingling in both feet.

## 2018-11-14 ENCOUNTER — TELEPHONE (OUTPATIENT)
Dept: NEUROLOGY | Facility: CLINIC | Age: 63
End: 2018-11-14

## 2018-11-15 ENCOUNTER — APPOINTMENT (OUTPATIENT)
Dept: LAB | Age: 63
End: 2018-11-15
Attending: INTERNAL MEDICINE
Payer: COMMERCIAL

## 2018-11-15 ENCOUNTER — OFFICE VISIT (OUTPATIENT)
Dept: SURGERY | Facility: CLINIC | Age: 63
End: 2018-11-15
Payer: COMMERCIAL

## 2018-11-15 ENCOUNTER — TELEPHONE (OUTPATIENT)
Dept: SURGERY | Facility: CLINIC | Age: 63
End: 2018-11-15

## 2018-11-15 VITALS — SYSTOLIC BLOOD PRESSURE: 116 MMHG | DIASTOLIC BLOOD PRESSURE: 66 MMHG | HEART RATE: 70 BPM

## 2018-11-15 DIAGNOSIS — R63.4 RECENT WEIGHT LOSS: ICD-10-CM

## 2018-11-15 DIAGNOSIS — M26.69 JAW CLAUDICATION: ICD-10-CM

## 2018-11-15 DIAGNOSIS — M21.372 FOOT DROP, BILATERAL: Primary | ICD-10-CM

## 2018-11-15 DIAGNOSIS — E56.9 VITAMIN DEFICIENCY: ICD-10-CM

## 2018-11-15 DIAGNOSIS — R20.2 NUMBNESS AND TINGLING OF BOTH FEET: ICD-10-CM

## 2018-11-15 DIAGNOSIS — R51.9 TENDERNESS OF TEMPLE REGION: ICD-10-CM

## 2018-11-15 DIAGNOSIS — M43.6 NECK STIFFNESS: ICD-10-CM

## 2018-11-15 DIAGNOSIS — R20.0 NUMBNESS AND TINGLING OF BOTH FEET: ICD-10-CM

## 2018-11-15 DIAGNOSIS — M21.379 FOOT-DROP, UNSPECIFIED LATERALITY: ICD-10-CM

## 2018-11-15 DIAGNOSIS — G62.9 POLYNEUROPATHY: ICD-10-CM

## 2018-11-15 DIAGNOSIS — E44.1 PROTEIN-CALORIE MALNUTRITION, MILD (HCC): ICD-10-CM

## 2018-11-15 DIAGNOSIS — Z98.84 HX OF BARIATRIC SURGERY: ICD-10-CM

## 2018-11-15 DIAGNOSIS — M21.371 FOOT DROP, BILATERAL: Primary | ICD-10-CM

## 2018-11-15 DIAGNOSIS — R29.898 DECREASED RANGE OF MOTION OF NECK: ICD-10-CM

## 2018-11-15 DIAGNOSIS — Z98.1 S/P CERVICAL SPINAL FUSION: ICD-10-CM

## 2018-11-15 PROCEDURE — 84207 ASSAY OF VITAMIN B-6: CPT

## 2018-11-15 PROCEDURE — 36415 COLL VENOUS BLD VENIPUNCTURE: CPT

## 2018-11-15 PROCEDURE — 99213 OFFICE O/P EST LOW 20 MIN: CPT | Performed by: PHYSICIAN ASSISTANT

## 2018-11-15 NOTE — PROGRESS NOTES
Pt is here for follow up post imaging. Xray myelogram and CT of the cervical. Pt states that he is still the same since the last time we seen him in office.       Pain scale: 1/10

## 2018-11-15 NOTE — PROGRESS NOTES
Neurosurgery Cervical  Follow up      REASON FOR VISIT: Cervical follow up, foot drop, follow up after neurology      HISTORY OF PRESENT ILLNESS:Favio Morocho is a 58year old male s/p ACDF C3-4, C4-5, C6-7 with Dr. Vincent Cameron 6/27/18.     Per patient, Dr. Jeison Murray and speech she was diagnosed with left vocal cord paresis. He does have a nasogastric feeding tube. He was in the ER last night for blockage.   He had a swallow study performed yesterday which is unchanged from his hospital admission, shows delayed pharyn denies any bowel bladder incontinence.     He gets anterior hip pain with ambulation around the iliac crest.  He also complains of heaviness in his legs when he walks and stiffness in the morning      REVIEW OF SYSTEMS:  Comprehensive review of systems don Dose Index Registry. Please refer to the cervical plain film myelogram dictation which is reported separately.        PATIENT STATED HISTORY: (As transcribed by Technologist)  Post cervical myelogram.      FINDINGS:    CRANIOCERVICAL AREA:  Normal foramen Moderate left foraminal narrowing at C5-6 and C6-7.      Dictated by: Markel Styles MD on 10/25/2018 at 18:42       Approved by: Markel Styles MD         ASSESSMENT:  (M21.371,  M21.372) Foot drop, bilateral  (primary encounter diagnosis)    (M26.69) Jaw cl

## 2018-11-16 ENCOUNTER — TELEPHONE (OUTPATIENT)
Dept: FAMILY MEDICINE CLINIC | Facility: CLINIC | Age: 63
End: 2018-11-16

## 2018-11-16 ENCOUNTER — LAB ENCOUNTER (OUTPATIENT)
Dept: LAB | Age: 63
End: 2018-11-16
Attending: NEUROLOGICAL SURGERY
Payer: COMMERCIAL

## 2018-11-16 DIAGNOSIS — M21.372 FOOT DROP, BILATERAL: ICD-10-CM

## 2018-11-16 DIAGNOSIS — M21.371 FOOT DROP, BILATERAL: ICD-10-CM

## 2018-11-16 DIAGNOSIS — R51.9 TENDERNESS OF TEMPLE REGION: ICD-10-CM

## 2018-11-16 DIAGNOSIS — G62.9 POLYNEUROPATHY: ICD-10-CM

## 2018-11-16 DIAGNOSIS — M26.69 JAW CLAUDICATION: ICD-10-CM

## 2018-11-16 PROCEDURE — 85610 PROTHROMBIN TIME: CPT

## 2018-11-16 PROCEDURE — 80053 COMPREHEN METABOLIC PANEL: CPT

## 2018-11-16 PROCEDURE — 85652 RBC SED RATE AUTOMATED: CPT

## 2018-11-16 PROCEDURE — 85025 COMPLETE CBC W/AUTO DIFF WBC: CPT

## 2018-11-16 PROCEDURE — 85730 THROMBOPLASTIN TIME PARTIAL: CPT

## 2018-11-16 PROCEDURE — 86140 C-REACTIVE PROTEIN: CPT

## 2018-11-16 PROCEDURE — 36415 COLL VENOUS BLD VENIPUNCTURE: CPT

## 2018-11-16 NOTE — TELEPHONE ENCOUNTER
No pre-Auth needed see referral pool for more information.   PCP clearance needed    Patient scheduled for a sural nerve biopsy on 11/27/2018 with Dr. Ani Whelan  Instructions mailed to patient via [employer]     · Stop all over the counter non-steroidal anti-infla

## 2018-11-16 NOTE — TELEPHONE ENCOUNTER
Medical Clearance/H&P request.  Patient is scheduled to have Sural nerve Biopsy on 11/27/2018 with Dr. Nima Caballero. I spoke with patient, appointment scheduled 11/19/2018. Paperwork in pre-op folder(Mayra).

## 2018-11-16 NOTE — TELEPHONE ENCOUNTER
Pt given sx date by Dr. Terrel Nyhan  Will need to call to give surgical instructions    Lab orders placed, need sx order     PA needed

## 2018-11-16 NOTE — ICD/PM
No change to Jake East for sural nerve biopsy. Grounding pad on thigh or calf, opposite side of ICD if able.   Routine outpatient follow up    [address] [facility], NP

## 2018-11-19 ENCOUNTER — OFFICE VISIT (OUTPATIENT)
Dept: FAMILY MEDICINE CLINIC | Facility: CLINIC | Age: 63
End: 2018-11-19
Payer: COMMERCIAL

## 2018-11-19 VITALS
HEART RATE: 66 BPM | SYSTOLIC BLOOD PRESSURE: 120 MMHG | DIASTOLIC BLOOD PRESSURE: 70 MMHG | BODY MASS INDEX: 24.08 KG/M2 | RESPIRATION RATE: 12 BRPM | WEIGHT: 172 LBS | TEMPERATURE: 97 F | HEIGHT: 71 IN | OXYGEN SATURATION: 97 %

## 2018-11-19 DIAGNOSIS — R20.2 NUMBNESS AND TINGLING OF BOTH FEET: ICD-10-CM

## 2018-11-19 DIAGNOSIS — Z01.818 PREOPERATIVE CLEARANCE: Primary | ICD-10-CM

## 2018-11-19 DIAGNOSIS — R20.0 NUMBNESS AND TINGLING OF BOTH FEET: ICD-10-CM

## 2018-11-19 PROCEDURE — 99243 OFF/OP CNSLTJ NEW/EST LOW 30: CPT | Performed by: FAMILY MEDICINE

## 2018-11-19 NOTE — TELEPHONE ENCOUNTER
Pt calling, states Dr. Presley Solares from [facility] (in [facility]) needs request for pt to hold his xarelto, please fax to Dr. Charlton Look office @ [phone]

## 2018-11-20 ENCOUNTER — OFFICE VISIT (OUTPATIENT)
Dept: SURGERY | Facility: CLINIC | Age: 63
End: 2018-11-20
Payer: COMMERCIAL

## 2018-11-20 VITALS
HEIGHT: 71 IN | SYSTOLIC BLOOD PRESSURE: 130 MMHG | DIASTOLIC BLOOD PRESSURE: 70 MMHG | BODY MASS INDEX: 23.94 KG/M2 | RESPIRATION RATE: 16 BRPM | WEIGHT: 171 LBS

## 2018-11-20 DIAGNOSIS — E53.1 VITAMIN B6 DEFICIENCY: ICD-10-CM

## 2018-11-20 DIAGNOSIS — I10 ESSENTIAL HYPERTENSION: Primary | ICD-10-CM

## 2018-11-20 DIAGNOSIS — Z98.84 H/O GASTRIC BYPASS: ICD-10-CM

## 2018-11-20 PROCEDURE — 99214 OFFICE O/P EST MOD 30 MIN: CPT | Performed by: INTERNAL MEDICINE

## 2018-11-20 NOTE — PROGRESS NOTES
[address], [address]  Dept: [phone]      Patient:  Lobo Marino  :      10/12/1955  MRN:      [id number]    Referring Provider: Lalo Santo MCG/ACT Inhalation Aero Soln, Inhale 2 puffs into the lungs every 4 (four) hours as needed. , Disp: , Rfl:   •  Mometasone Furo-Formoterol Fum (DULERA) 200-5 MCG/ACT Inhalation Aerosol, Inhale 2 puffs into the lungs 2 (two) times daily. , Disp: , Rfl:   • ANTERIOR CERVICAL DISCECTOMY AND FUSION EXTENSION. CERVICAL 3-4, CERVICAL 4-5, CERVICAL 6-7.  MICRODISCECTOMY, INTERBODY FUSION WITH PLATE AND ALLOGRAFT SCREWS N/A    • OTHER SURGICAL HISTORY Right 07/2007    FOOT SX   • SACROILIAC JOINT INJECTION BILATERAL affect. His behavior is normal. Judgment and thought content normal.   Vitals reviewed. Assessment     HYPERTENSION:  The patient's blood pressure has been well controlled.   he has been checking it as instructed and has remained in relatively good contr

## 2018-11-20 NOTE — TELEPHONE ENCOUNTER
Per PCP - 11/19/18:  \"Preoperative clearance  -  Pending lab results and EKG, will medically clear patient for both right sural nerve and temporal artery biopsies on 11/27/18 with Dr. Rosetta Cranker at [facility]  -  No contraindications for surgery

## 2018-11-21 ENCOUNTER — PRIOR ORIGINAL RECORDS (OUTPATIENT)
Dept: OTHER | Age: 63
End: 2018-11-21

## 2018-11-23 ENCOUNTER — APPOINTMENT (OUTPATIENT)
Dept: LAB | Facility: HOSPITAL | Age: 63
End: 2018-11-23
Attending: FAMILY MEDICINE
Payer: COMMERCIAL

## 2018-11-23 ENCOUNTER — TELEPHONE (OUTPATIENT)
Dept: FAMILY MEDICINE CLINIC | Facility: CLINIC | Age: 63
End: 2018-11-23

## 2018-11-23 DIAGNOSIS — Z01.818 PREOP EXAMINATION: ICD-10-CM

## 2018-11-23 DIAGNOSIS — Z01.818 PREOP EXAMINATION: Primary | ICD-10-CM

## 2018-11-23 PROCEDURE — 93005 ELECTROCARDIOGRAM TRACING: CPT

## 2018-11-23 PROCEDURE — 93010 ELECTROCARDIOGRAM REPORT: CPT | Performed by: INTERNAL MEDICINE

## 2018-11-23 NOTE — TELEPHONE ENCOUNTER
See note below , pt needs new EKG done asap for surgery 11/27/18, pt needs new order. Please advise.

## 2018-11-23 NOTE — TELEPHONE ENCOUNTER
[facility] today and all other info noted below. Advised to call [facility] sched now to sched ekg today/asap in order to have results asap and before procedure sched 11/27.  Pt confirms has caprice

## 2018-11-23 NOTE — TELEPHONE ENCOUNTER
Lakisha Canton calling to see if pt had an EKG done for his procedure on Tuesday. Last EKG was in July but per Lakisha Moon, needs to be within 3 months of procedure. EKG to be faxed to [phone].

## 2018-11-26 ENCOUNTER — ANESTHESIA EVENT (OUTPATIENT)
Dept: SURGERY | Facility: HOSPITAL | Age: 63
End: 2018-11-26

## 2018-11-26 NOTE — H&P
History & Physical Examination    Patient Name: Walter Quezada  MRN: [id number]  CSN: [id number]  YOB: 1955    Diagnosis: Drop foot, bilateral. Numbness and tingling of both feet.      Present Illness: Very pleasant 59 y/o male presents for s 6/29/18.     Did not take methylprednisolone. Feels he is stable but has not regressed.  He has developed pain between the shoulder blades.     Last hx 9/20/18  Patient returns today in follow-up. Guera Haddad had good strength in his lower extremities and upper ext Mihai Truong has left C6 radiculopathy which is a 5-7/10.  He has numbness in all 5 fingers.  He feels like his left arm and hand are cool constantly.  He complains of weakness in the left arm denies any weakness in the right arm.  Has trouble opening jars.  He is obstruction and other lower urinary tract symptoms (LUTS) 9/8/2015   • Morbid obesity with BMI of 40.0-44.9, adult [facility])    • Muscle weakness     feet   • Neuropathy     feet   • Nocturia 9/8/2015   • Nose disorder    • Osteoarthritis    • Osteoporosis DISCECTOMY AND FUSION EXTENSION. CERVICAL 3-4, CERVICAL 4-5, CERVICAL 6-7.  MICRODISCECTOMY, INTERBODY FUSION WITH PLATE AND ALLOGRAFT SCREWS N/A    • OTHER SURGICAL HISTORY Right 07/2007    FOOT SX   • SACROILIAC JOINT INJECTION BILATERAL Bilateral 11/17/2 Great Toe   Right       5         5       5         4- 4+ 4-   Left       5         5       5         4 4+ 4-     [ x ] I have discussed the risks and benefits and alternatives with the patient/family.   They understand and agree to proceed with plan of car

## 2018-11-26 NOTE — PROGRESS NOTES
Based on stable labs and EKG, patient is medically clear for right sided temporal artery and sural nerve biopsies to be done on November 27, 2018 at [facility] with Dr. Theo Rubin.   If you have any questions or concerns, please feel free to call th

## 2018-11-27 ENCOUNTER — ANESTHESIA (OUTPATIENT)
Dept: SURGERY | Facility: HOSPITAL | Age: 63
End: 2018-11-27

## 2018-11-27 ENCOUNTER — HOSPITAL ENCOUNTER (OUTPATIENT)
Facility: HOSPITAL | Age: 63
Setting detail: HOSPITAL OUTPATIENT SURGERY
Discharge: HOME | End: 2018-11-27
Attending: NEUROLOGICAL SURGERY | Admitting: NEUROLOGICAL SURGERY
Payer: COMMERCIAL

## 2018-11-27 VITALS
DIASTOLIC BLOOD PRESSURE: 57 MMHG | WEIGHT: 170.63 LBS | RESPIRATION RATE: 18 BRPM | BODY MASS INDEX: 23.11 KG/M2 | TEMPERATURE: 99 F | HEART RATE: 75 BPM | SYSTOLIC BLOOD PRESSURE: 108 MMHG | HEIGHT: 72 IN | OXYGEN SATURATION: 97 %

## 2018-11-27 DIAGNOSIS — M21.372 FOOT DROP, BILATERAL: ICD-10-CM

## 2018-11-27 DIAGNOSIS — R20.0 NUMBNESS AND TINGLING OF BOTH FEET: ICD-10-CM

## 2018-11-27 DIAGNOSIS — R20.2 NUMBNESS AND TINGLING OF BOTH FEET: ICD-10-CM

## 2018-11-27 DIAGNOSIS — M21.371 FOOT DROP, BILATERAL: ICD-10-CM

## 2018-11-27 PROCEDURE — A4216 STERILE WATER/SALINE, 10 ML: HCPCS

## 2018-11-27 PROCEDURE — 01BG0ZX EXCISION OF TIBIAL NERVE, OPEN APPROACH, DIAGNOSTIC: ICD-10-PCS | Performed by: NEUROLOGICAL SURGERY

## 2018-11-27 PROCEDURE — 88313 SPECIAL STAINS GROUP 2: CPT | Performed by: NEUROLOGICAL SURGERY

## 2018-11-27 PROCEDURE — 88305 TISSUE EXAM BY PATHOLOGIST: CPT | Performed by: NEUROLOGICAL SURGERY

## 2018-11-27 RX ORDER — DIPHENHYDRAMINE HYDROCHLORIDE 50 MG/ML
12.5 INJECTION INTRAMUSCULAR; INTRAVENOUS AS NEEDED
Status: DISCONTINUED | OUTPATIENT
Start: 2018-11-27 | End: 2018-11-27

## 2018-11-27 RX ORDER — HYDROCODONE BITARTRATE AND ACETAMINOPHEN 5; 325 MG/1; MG/1
2 TABLET ORAL AS NEEDED
Status: DISCONTINUED | OUTPATIENT
Start: 2018-11-27 | End: 2018-11-27

## 2018-11-27 RX ORDER — MORPHINE SULFATE 4 MG/ML
2 INJECTION, SOLUTION INTRAMUSCULAR; INTRAVENOUS EVERY 5 MIN PRN
Status: DISCONTINUED | OUTPATIENT
Start: 2018-11-27 | End: 2018-11-27

## 2018-11-27 RX ORDER — ACETAMINOPHEN 500 MG
1000 TABLET ORAL ONCE
Status: DISCONTINUED | OUTPATIENT
Start: 2018-11-27 | End: 2018-11-27

## 2018-11-27 RX ORDER — LIDOCAINE HYDROCHLORIDE AND EPINEPHRINE 10; 10 MG/ML; UG/ML
INJECTION, SOLUTION INFILTRATION; PERINEURAL AS NEEDED
Status: DISCONTINUED | OUTPATIENT
Start: 2018-11-27 | End: 2018-11-27 | Stop reason: HOSPADM

## 2018-11-27 RX ORDER — TRAMADOL HYDROCHLORIDE 50 MG/1
TABLET ORAL EVERY 6 HOURS PRN
Qty: 40 TABLET | Refills: 0 | Status: SHIPPED | OUTPATIENT
Start: 2018-11-27 | End: 2018-12-17 | Stop reason: ALTCHOICE

## 2018-11-27 RX ORDER — SODIUM CHLORIDE, SODIUM LACTATE, POTASSIUM CHLORIDE, CALCIUM CHLORIDE 600; 310; 30; 20 MG/100ML; MG/100ML; MG/100ML; MG/100ML
INJECTION, SOLUTION INTRAVENOUS CONTINUOUS
Status: DISCONTINUED | OUTPATIENT
Start: 2018-11-27 | End: 2018-11-27

## 2018-11-27 RX ORDER — CEFAZOLIN SODIUM/WATER 2 G/20 ML
2 SYRINGE (ML) INTRAVENOUS ONCE
Status: DISCONTINUED | OUTPATIENT
Start: 2018-11-27 | End: 2018-11-27 | Stop reason: HOSPADM

## 2018-11-27 RX ORDER — MIDAZOLAM HYDROCHLORIDE 1 MG/ML
1 INJECTION INTRAMUSCULAR; INTRAVENOUS EVERY 5 MIN PRN
Status: DISCONTINUED | OUTPATIENT
Start: 2018-11-27 | End: 2018-11-27

## 2018-11-27 RX ORDER — ONDANSETRON 2 MG/ML
4 INJECTION INTRAMUSCULAR; INTRAVENOUS AS NEEDED
Status: DISCONTINUED | OUTPATIENT
Start: 2018-11-27 | End: 2018-11-27

## 2018-11-27 RX ORDER — HYDROCODONE BITARTRATE AND ACETAMINOPHEN 5; 325 MG/1; MG/1
1 TABLET ORAL AS NEEDED
Status: DISCONTINUED | OUTPATIENT
Start: 2018-11-27 | End: 2018-11-27

## 2018-11-27 RX ORDER — NALOXONE HYDROCHLORIDE 0.4 MG/ML
80 INJECTION, SOLUTION INTRAMUSCULAR; INTRAVENOUS; SUBCUTANEOUS AS NEEDED
Status: DISCONTINUED | OUTPATIENT
Start: 2018-11-27 | End: 2018-11-27

## 2018-11-27 RX ORDER — BACITRACIN 50000 [USP'U]/1
INJECTION, POWDER, LYOPHILIZED, FOR SOLUTION INTRAMUSCULAR AS NEEDED
Status: DISCONTINUED | OUTPATIENT
Start: 2018-11-27 | End: 2018-11-27 | Stop reason: HOSPADM

## 2018-11-27 RX ORDER — DEXAMETHASONE SODIUM PHOSPHATE 4 MG/ML
4 VIAL (ML) INJECTION AS NEEDED
Status: DISCONTINUED | OUTPATIENT
Start: 2018-11-27 | End: 2018-11-27

## 2018-11-27 NOTE — ANESTHESIA PREPROCEDURE EVALUATION
PRE-OP EVALUATION    Patient Name: Weston Elizondo    Pre-op Diagnosis: Foot drop, bilateral [M21.371, M21.372]  Numbness and tingling of both feet [R20.0, R20.2]    Procedure(s):  RIGHT SURAL NERVE BIOPSY AND ALL INDICATED PROCEDURES    Surgeon(s) and Rol difficult airway       GI/Hepatic/Renal      (+) GERD                           Cardiovascular  Comment: IMPRESSION:  1. Probable normal SPECT , above diaphragm attenuation. 2. Normal EKG response to Lexiscan infusion. 3. Normal LV cavity dimension.  EF INJECTION Left 12/17/2015    Performed by Luz Perez MD at [facility] MAIN OR   • HIP JOINT INJECTION Right 12/10/2015    Performed by Luz Perez MD at [facility] MAIN OR   • [address] Right 1995   • OTHER  06/27/2018    ANTERIOR CERVICAL DISCECTOM patient meets guidelines. Patient has not taken beta blockers in last 24 hours. Post-procedure pain management plan discussed with surgeon and patient. Comment: Case will go at 0800, since he ate milk/cookies at midnite.   Family/surgeon aware and unde

## 2018-11-27 NOTE — ANESTHESIA POSTPROCEDURE EVALUATION
[facility] Patient Status:  Hospital Outpatient Surgery   Age/Gender 58year old male MRN [id number]   Location [facility] Attending Rene Cameron MD   Hosp Day # 0 [facility] 315 [facility]

## 2018-11-28 ENCOUNTER — TELEPHONE (OUTPATIENT)
Dept: SURGERY | Facility: CLINIC | Age: 63
End: 2018-11-28

## 2018-11-28 ENCOUNTER — MED REC SCAN ONLY (OUTPATIENT)
Dept: FAMILY MEDICINE CLINIC | Facility: CLINIC | Age: 63
End: 2018-11-28

## 2018-11-30 NOTE — OPERATIVE REPORT
[facility]    OPERATIVE REPORT  Patient:  Gena Mercado;  YOB: 1955     [facility]:  [id number]; Medical Record Number:  [id number]    Admission Date:  11/27/2018 Operation Date:  11/27/2018    . ..........................     Operating Physic sterile derssing was applied. He was awakened and returned to same day surgery for recovery and discharge,    Pierre Najjar.  Maurizio Styles, [address] [facility]  Neurological Surgery    Co-Director  [facility]

## 2018-12-05 ENCOUNTER — OFFICE VISIT (OUTPATIENT)
Dept: PHYSICAL THERAPY | Age: 63
End: 2018-12-05
Attending: PHYSICIAN ASSISTANT
Payer: COMMERCIAL

## 2018-12-05 PROCEDURE — 97110 THERAPEUTIC EXERCISES: CPT

## 2018-12-05 PROCEDURE — 97162 PT EVAL MOD COMPLEX 30 MIN: CPT

## 2018-12-05 NOTE — PROGRESS NOTES
SPINE EVALUATION:   Referring Physician: Dr. Kruger ref.  provider found  Diagnosis: s/p C3-7 ACDF , fall/balance impairments, neck pain/stiffness   Date of Service: 12/5/2018     PATIENT SUMMARY   Sharyle Casey is a 58year old y/o male who presents to  months- longer than the rest of the symptoms. Very similar to before he had PM implanted and when he was working he would bend down and get light headedness- it resolved after implant. He has HTN - has had no recent changes.  He has not seen ENT re dizzines Observation/Posture: forward head, otherwise unremarkable, pt with observable weight loss since last therapy bout  Gait: SPC, limited DF during swing, R>L, limited heel strike R>L, slight foot drop R>L  Neuro Screen: sensation diminished lateral foot/ank smooth gait path or uses walking aid. (1) Moderate Impairment: Preforms head turns with moderate change in gait velocity, slows down, staggers but recovers, can continue to walk.   (0)  Severe Impairment: Preform task with severe disruption of gait, i.e., exercises (Seated active DF, PF, toe scrunches, toe extension, great toe extension self stretch)  Charges: PT Eval Moderate Complexity, there ex: 1      Total Timed Treatment: 15 min     Total Treatment Time: 45 min   In agreement with FOTO score and clini [phone].  If you have any questions, please contact me at Dept: [phone]    Sincerely,  Electronically signed by therapist: Tamanna Rodgers, PT    [de-identified] certification required: Yes  I certify the need for these services furnished under this p

## 2018-12-06 ENCOUNTER — OFFICE VISIT (OUTPATIENT)
Dept: PHYSICAL THERAPY | Age: 63
End: 2018-12-06
Attending: PHYSICIAN ASSISTANT
Payer: COMMERCIAL

## 2018-12-06 PROCEDURE — 97112 NEUROMUSCULAR REEDUCATION: CPT

## 2018-12-06 PROCEDURE — 97116 GAIT TRAINING THERAPY: CPT

## 2018-12-06 PROCEDURE — 97110 THERAPEUTIC EXERCISES: CPT

## 2018-12-06 NOTE — PROGRESS NOTES
Dx:  s/p C3-7 ACDF , fall/balance impairments, neck pain/stiffness           Authorized # of Visits:  60/year         Next MD visit: EMG cancelled until after visit with physician Friday  Fall Risk: standard       Precautions: see dx             Subjective extension with DF stretch 30 sec hold x 3 sets R/L         Yellow band DF 10 reps x 2 sets R; 12 reps x 2 sets on L         Seated DF 10 sec hold 10 reps bilaterally         Standing double leg heel raises 20 reps         Cervical AROM: flexion, extension,

## 2018-12-07 ENCOUNTER — OFFICE VISIT (OUTPATIENT)
Dept: NEUROLOGY | Facility: CLINIC | Age: 63
End: 2018-12-07
Payer: COMMERCIAL

## 2018-12-07 VITALS
SYSTOLIC BLOOD PRESSURE: 118 MMHG | HEART RATE: 58 BPM | BODY MASS INDEX: 24 KG/M2 | RESPIRATION RATE: 14 BRPM | DIASTOLIC BLOOD PRESSURE: 64 MMHG | WEIGHT: 173 LBS

## 2018-12-07 DIAGNOSIS — G62.9 POLYNEUROPATHY: Primary | ICD-10-CM

## 2018-12-07 PROCEDURE — 99214 OFFICE O/P EST MOD 30 MIN: CPT | Performed by: OTHER

## 2018-12-07 NOTE — PROGRESS NOTES
Neurology H&P    Gretchen Peters Patient Status:  No patient class for patient encounter    10/12/1955 MRN [id number]   Location [address] [place] [address], [address] [place] [address], [address] [place] [address] Attending No att. providers found   Hosp Day # 0 PCP [facility], DO store etc.      He also tells me that that he has been having frequent headaches. He tells me that he has been getting headaches since he had a bariatric surgery last June 2017. He states that he only gets headaches when he eats too much.  He states that he Oral Tab Take 10 mg by mouth nightly. Disp:  Rfl:    Rivaroxaban (XARELTO) 20 MG Oral Tab Take 20 mg by mouth daily with food. Disp:  Rfl:    betamethasone dipropionate 0.05 % External Cream Apply 1 Application topically 2 (two) times daily.  Disp: 90 g Rfl gastric bypass     Tension type headache     Overweight (BMI 25.0-29. 9)     Memory loss     Orthostatic lightheadedness     History of pulmonary embolism     Cervical disc disorder     COPD exacerbation (HCC)     Dysphagia     Cervical spondylosis with mye Wheezing        PSHx:  Past Surgical History:   Procedure Laterality Date   • ANGIOGRAM     • ANTERIOR CERVICAL FUSION BG & INST 3 LEVEL N/A 6/27/2018    Performed by Idris Flanagan MD at [facility] MAIN OR   • BACK SURGERY     • [address] GASTRIC BYPASS Suraj Dockery INJECTION Left 2/9/2017    Performed by Joycelyn Sommer MD at [facility] MAIN OR       SocHx:  Social History    Tobacco Use      Smoking status: Never Smoker      Smokeless tobacco: Never Used    Alcohol use: Yes      Comment: occasionally    Drug use:  No atrophy, strong shoulder shrug.     MOTOR EXAMINATION:   MSK:  RUE: Delt:  5/5, Bi: 5/5, Tri: 5/5, : 5/5, interossei: 5/5, APB: 5/5, FE: 5/5  LUE: Delt:  5/5, Bi: 5/5, Tri: 5/5, : 5/5, interossei: 5/5, APB: 4/5, FE: 4+/5  RLE: HF: 4+/5, KE: 5/5, KF: 0.25 ratio 0.12 0.13   IGG INDEX      0.28 - 0.66 ratio  0.51   ASIALO-GM1 ANTIBODIES, IGG/IGM      0 - 50     GM1 ANTIBODIES, IGG/IGM      0 - 50 IV     GM2 ANTIBODIES, IGG/IGM      0 - 50 IV     GD1A ANTIBODIES, IGG/IGM      0 - 50 IV     GD1B ANTIBODIES 5 /mm3    RBC CSF      /mm3    Vitamin B12      193 - 986 pg/mL    DEBBIE SCREEN      Negative Negative   ANGIOTENSIN CONVERTING ENZYME,      9 - 67 U/L 22   SED RATE      0 - 12 mm/Hr 6   ANGIOTENSIN CONVERTING E, CSF      0.0 - 2.5 U/L    TOTAL PROTEIN CSF severe axonal loss in the left peroneal nerve. Asymmetrical sensory response was also seen. 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   Further neurological evaluation is

## 2018-12-10 ENCOUNTER — OFFICE VISIT (OUTPATIENT)
Dept: PHYSICAL THERAPY | Age: 63
End: 2018-12-10
Attending: PHYSICIAN ASSISTANT
Payer: COMMERCIAL

## 2018-12-10 PROCEDURE — 97110 THERAPEUTIC EXERCISES: CPT

## 2018-12-10 PROCEDURE — 97116 GAIT TRAINING THERAPY: CPT

## 2018-12-10 PROCEDURE — 97112 NEUROMUSCULAR REEDUCATION: CPT

## 2018-12-10 NOTE — PROGRESS NOTES
Dx:  s/p C3-7 ACDF , fall/balance impairments, neck pain/stiffness           Authorized # of Visits:  60/year         Next MD visit: 4 weeks   Fall Risk: standard       Precautions: see dx             Subjective: Pt reports driving a lot over the weekend a 2 sets R/L *pain around lateral malleolus on the R        Manual Great toe extension with DF stretch 30 sec hold x 3 sets R/L Yellow band INV 12 reps x 2 sets R/L        Yellow band DF 10 reps x 2 sets R; 12 reps x 2 sets on L Yellow band DF 12 reps x 2 se

## 2018-12-11 ENCOUNTER — OFFICE VISIT (OUTPATIENT)
Dept: SURGERY | Facility: CLINIC | Age: 63
End: 2018-12-11
Payer: COMMERCIAL

## 2018-12-11 VITALS — BODY MASS INDEX: 23.89 KG/M2 | HEIGHT: 71 IN | WEIGHT: 170.63 LBS

## 2018-12-11 VITALS — HEART RATE: 60 BPM | DIASTOLIC BLOOD PRESSURE: 76 MMHG | SYSTOLIC BLOOD PRESSURE: 122 MMHG

## 2018-12-11 DIAGNOSIS — M48.02 CERVICAL STENOSIS OF SPINAL CANAL: ICD-10-CM

## 2018-12-11 DIAGNOSIS — Z98.1 S/P CERVICAL SPINAL FUSION: Primary | ICD-10-CM

## 2018-12-11 PROCEDURE — 97803 MED NUTRITION INDIV SUBSEQ: CPT | Performed by: DIETITIAN, REGISTERED

## 2018-12-11 PROCEDURE — 99024 POSTOP FOLLOW-UP VISIT: CPT | Performed by: PHYSICIAN ASSISTANT

## 2018-12-11 NOTE — PROGRESS NOTES
Pt is here for follow up for right sural nerve biopsy. Sx date 11/27/18 Pt states that he is doing well.      Pain scale: 1/10

## 2018-12-11 NOTE — PROGRESS NOTES
[facility]  [address] [place] [address]  Dept: [phone]  Loc: [phone]    12/11/18      Bariatric Follow-up Nutrition Session    Sampsondestiny Shannon is a 58year old male.      Renate 53 05/10/2018    HDL 44 (L) 05/10/2018    LDL 56 05/10/2018    VLDL 11 05/10/2018    TCHDLRATIO 2.52 05/10/2018    NONHDLC 67 05/10/2018        Vitamins/Minerals:  Lab Results   Component Value Date    B12 730 08/23/2018     Lab Results   Component Value D Encounters:  12/11/18 : 170 lb 9.6 oz  12/07/18 : 173 lb  11/27/18 : 170 lb 10.2 oz  11/20/18 : 171 lb  11/19/18 : 172 lb  11/13/18 : 176 lb      Weight change:  Pt has gained 3.5 lbs since last RD visit 09/10/2018.   Post-Op Excess Body Weight Loss: 109% E Obesity: Resolved     Intervention     Recommendations/goals:    1. 60g protein per day, minimum- try having a Premier protein shake daily  2. 64oz per day of fluids  3. Stay under 130g CHO per day  4.  Continue MVs + B6      Monitor/Evaluate     Anthropome

## 2018-12-11 NOTE — PROGRESS NOTES
[facility] Neurosurgery eval      HISTORY OF PRESENT ILLNESS:Favio Myers is a 58year old RH male in follow-up to have sural nerve biopsy. He did see Dr. Igor Hoffmann. There is no clear demyelination process possible B6 may be affecting his weakness.   Overall His neck pain is mild it is a 2/10. He is taking crushed Norco 5 mg tablets once a day. He has occasional left forearm pain. But otherwise denies radiculopathy. His back pain is resolved. His thoracic and lower back tightness have resolved.   His hip t • Arrhythmia     V tach   • Arthritis    • Asthma    • Back pain    • Back problem    • Cardiac defibrillator in place    • Cellulitis and abscess of trunk    • COPD (chronic obstructive pulmonary disease) ([facility])     no O2   • Coronary atherosclerosis    • [place] [employer], magnet response inhibit therapy, not pacemaker dependent   • CHOLECYSTECTOMY  2012    Dr. Ju Cheung   • COLONOSCOPY N/A 5/7/2015    Performed by Wyline Ormond, MD at [facility] ENDOSCOPY   • ESOPHAGOGASTRODUODENOSCOPY, POSSIBLE BIOPSY, POSSIBLE reports that  has never smoked. he has never used smokeless tobacco. He reports that he drinks alcohol. He reports that he does not use drugs.     ALLERGIES:    Bee Venom               ANAPHYLAXIS    MEDICATIONS:    Current Outpatient Medications on File P NEUROLOGICAL:  This patient is alert and orientated x 3. Speech fluent. Comprehension intact. Face is symmetrical.     SPINE:  Gait intact. No clonus. Negative Hebert's. His right posterior ankle incision is healing well. Sutures are in place.   He EMG nerve conductionIMPRESSION:  This is an abnormal study. 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   There is a conduction block

## 2018-12-12 ENCOUNTER — OFFICE VISIT (OUTPATIENT)
Dept: PHYSICAL THERAPY | Age: 63
End: 2018-12-12
Attending: PHYSICIAN ASSISTANT
Payer: COMMERCIAL

## 2018-12-12 PROCEDURE — 97116 GAIT TRAINING THERAPY: CPT

## 2018-12-12 PROCEDURE — 97110 THERAPEUTIC EXERCISES: CPT

## 2018-12-12 PROCEDURE — 97112 NEUROMUSCULAR REEDUCATION: CPT

## 2018-12-12 NOTE — PROGRESS NOTES
Dx:  s/p C3-7 ACDF , fall/balance impairments, neck pain/stiffness           Authorized # of Visits:  60/year         Next MD visit: 4 weeks   Fall Risk: standard       Precautions: see dx             Subjective: Pt reports sore after physician visit.  Tacho Sahni UE assist, 5 min  -       Calf stretch towel 30 sec hold x 3 sets R/L X 60 sec x 2 sets R/L *pain around lateral malleolus on the R Calf stretch towel 30 sec hold x 3 sets R/L       Manual Great toe extension with DF stretch 30 sec hold x 3 sets R/L Yellow Treatment Time: 45 min

## 2018-12-14 ENCOUNTER — PRIOR ORIGINAL RECORDS (OUTPATIENT)
Dept: OTHER | Age: 63
End: 2018-12-14

## 2018-12-17 ENCOUNTER — OFFICE VISIT (OUTPATIENT)
Dept: PHYSICAL THERAPY | Age: 63
End: 2018-12-17
Attending: PHYSICIAN ASSISTANT
Payer: COMMERCIAL

## 2018-12-17 ENCOUNTER — OFFICE VISIT (OUTPATIENT)
Dept: SURGERY | Facility: CLINIC | Age: 63
End: 2018-12-17
Payer: COMMERCIAL

## 2018-12-17 VITALS — OXYGEN SATURATION: 98 % | DIASTOLIC BLOOD PRESSURE: 60 MMHG | SYSTOLIC BLOOD PRESSURE: 100 MMHG | HEART RATE: 71 BPM

## 2018-12-17 DIAGNOSIS — M48.02 CERVICAL STENOSIS OF SPINAL CANAL: ICD-10-CM

## 2018-12-17 DIAGNOSIS — Z98.1 S/P CERVICAL SPINAL FUSION: Primary | ICD-10-CM

## 2018-12-17 PROCEDURE — 97112 NEUROMUSCULAR REEDUCATION: CPT

## 2018-12-17 PROCEDURE — 99024 POSTOP FOLLOW-UP VISIT: CPT | Performed by: PHYSICIAN ASSISTANT

## 2018-12-17 PROCEDURE — 97110 THERAPEUTIC EXERCISES: CPT

## 2018-12-17 NOTE — PROGRESS NOTES
[facility] Neurosurgery eval      HISTORY OF PRESENT ILLNESS:Favio Sierra is a 58year old RH male in follow-up to have sural nerve biopsy. Returns today for suture removal      Last history:   He did see Dr. Bhanu Quiroz.   There is no clear demyelination proce His neck pain is mild it is a 2/10. He is taking crushed Norco 5 mg tablets once a day. He has occasional left forearm pain. But otherwise denies radiculopathy. His back pain is resolved. His thoracic and lower back tightness have resolved.   His hip t • Arrhythmia     V tach   • Arthritis    • Asthma    • Back pain    • Back problem    • Cardiac defibrillator in place    • Cellulitis and abscess of trunk    • COPD (chronic obstructive pulmonary disease) ([facility])     no O2   • Coronary atherosclerosis    • [place] Scietnific, magnet response inhibit therapy, not pacemaker dependent   • CHOLECYSTECTOMY  2012    Dr. Yolanda Chicas   • COLONOSCOPY N/A 5/7/2015    Performed by Paige Hernandez MD at [facility] ENDOSCOPY   • ESOPHAGOGASTRODUODENOSCOPY, POSSIBLE BIOPSY, POSSIBLE reports that  has never smoked. he has never used smokeless tobacco. He reports that he drinks alcohol. He reports that he does not use drugs.     ALLERGIES:    Bee Venom               ANAPHYLAXIS    MEDICATIONS:    Current Outpatient Medications on File P Upper extremity strength: *With laying supine he has increased strength in his left deltoid iliopsoas and dorsiflexion. When he sits or flexes his neck he declines. Getting a possible mechanical origin.       Deltoid    Triceps     Biceps        Wrist EMG nerve conductionIMPRESSION:  This is an abnormal study. 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   There is a conduction block

## 2018-12-17 NOTE — PROGRESS NOTES
Dx:  s/p C3-7 ACDF , fall/balance impairments, neck pain/stiffness           Authorized # of Visits:  60/year         Next MD visit: 4 weeks   Fall Risk: standard       Precautions: see dx             Subjective: Pt reports most of his pain is just from blanco 12/17/2018   Tx#: 5/8 Date: Tx#: 6/ Date: Tx#: 7/ Date:    Tx#: 8/   Seated toe scrunches 20 reps R/L TM walking 1.5 mph BL UE assist, 5 min  - Seated nerve glides- TKE 10 reps x 2 sets on the R      Calf stretch towel 30 sec hold x 3 sets R/L X 60 sec 20 reps -         Gait with VCs for heel strike and controlled eccentric lowering of DF 5 min  -       HEP (written handouts provided) and pt education: cervical pain free AROM, ankle and foot exercises (Seated active DF, PF, toe scrunches, toe extension,

## 2019-01-02 ENCOUNTER — OFFICE VISIT (OUTPATIENT)
Dept: PHYSICAL THERAPY | Age: 64
End: 2019-01-02
Attending: PHYSICIAN ASSISTANT
Payer: MEDICARE

## 2019-01-02 PROCEDURE — 97110 THERAPEUTIC EXERCISES: CPT

## 2019-01-02 PROCEDURE — 97112 NEUROMUSCULAR REEDUCATION: CPT

## 2019-01-07 ENCOUNTER — APPOINTMENT (OUTPATIENT)
Dept: PHYSICAL THERAPY | Age: 64
End: 2019-01-07
Attending: PHYSICIAN ASSISTANT
Payer: MEDICARE

## 2019-01-10 ENCOUNTER — OFFICE VISIT (OUTPATIENT)
Dept: SURGERY | Facility: CLINIC | Age: 64
End: 2019-01-10
Payer: MEDICARE

## 2019-01-10 VITALS — DIASTOLIC BLOOD PRESSURE: 72 MMHG | SYSTOLIC BLOOD PRESSURE: 120 MMHG | HEART RATE: 78 BPM

## 2019-01-10 DIAGNOSIS — M48.02 CERVICAL STENOSIS OF SPINAL CANAL: Primary | ICD-10-CM

## 2019-01-10 DIAGNOSIS — Z98.1 S/P CERVICAL SPINAL FUSION: ICD-10-CM

## 2019-01-10 PROCEDURE — 99213 OFFICE O/P EST LOW 20 MIN: CPT | Performed by: PHYSICIAN ASSISTANT

## 2019-01-10 NOTE — PROGRESS NOTES
Pt is here for 6 week post op appt. Pt feels PT is helping quite a bit. Denies any changes from last appt. Pt rates pain 1/10  Denies numbness, tingling and weakness. Pt max pain is 2/10.

## 2019-01-10 NOTE — PROGRESS NOTES
[facility] Neurosurgery eval      HISTORY OF PRESENT ILLNESS:Favio Partida is a 58year old RH male in follow-up to have sural nerve biopsy. Walking is better. PT going well. Minimal neck pain. Last history:   He did see Dr. Nini Lamb.   There is no clear His neck pain is mild it is a 2/10. He is taking crushed Norco 5 mg tablets once a day. He has occasional left forearm pain. But otherwise denies radiculopathy. His back pain is resolved. His thoracic and lower back tightness have resolved.   His hip t • Arrhythmia     V tach   • Arthritis    • Asthma    • Back pain    • Back problem    • Cardiac defibrillator in place    • Cellulitis and abscess of trunk    • COPD (chronic obstructive pulmonary disease) ([facility])     no O2   • Coronary atherosclerosis    • [place] Scietnific, magnet response inhibit therapy, not pacemaker dependent   • CHOLECYSTECTOMY  2012    Dr. Ankit Tovar   • COLONOSCOPY N/A 5/7/2015    Performed by Gita Lai MD at [facility] ENDOSCOPY   • ESOPHAGOGASTRODUODENOSCOPY, POSSIBLE BIOPSY, POSSIBLE reports that  has never smoked. he has never used smokeless tobacco. He reports that he drinks alcohol. He reports that he does not use drugs.     ALLERGIES:    Bee Venom               ANAPHYLAXIS    MEDICATIONS:    Current Outpatient Medications on File P Upper extremity strength: *With laying supine he has increased strength in his dorsiflexion. When he sits or flexes his neck he declines. Getting a possible mechanical origin.       Deltoid    Triceps     Biceps        Wrist Extension  Finger extensio EMG nerve conductionIMPRESSION:  This is an abnormal study. 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   There is a conduction block

## 2019-01-10 NOTE — PATIENT INSTRUCTIONS
Refill policies:    • Allow 2-3 business days for refills; controlled substances may take longer.   • Contact your pharmacy at least 5 days prior to running out of medication and have them send an electronic request or submit request through the “request re entire amount billed. Precertification and Prior Authorizations: If your physician has recommended that you have a procedure or additional testing performed.   [facility]) will contact your insurance carrier to obtain pre-certi

## 2019-01-14 ENCOUNTER — MYAURORA ACCOUNT LINK (OUTPATIENT)
Dept: OTHER | Age: 64
End: 2019-01-14

## 2019-01-14 ENCOUNTER — TELEPHONE (OUTPATIENT)
Dept: FAMILY MEDICINE CLINIC | Facility: CLINIC | Age: 64
End: 2019-01-14

## 2019-01-14 ENCOUNTER — PRIOR ORIGINAL RECORDS (OUTPATIENT)
Dept: OTHER | Age: 64
End: 2019-01-14

## 2019-01-15 ENCOUNTER — TELEPHONE (OUTPATIENT)
Dept: FAMILY MEDICINE CLINIC | Facility: CLINIC | Age: 64
End: 2019-01-15

## 2019-01-15 ENCOUNTER — PRIOR ORIGINAL RECORDS (OUTPATIENT)
Dept: OTHER | Age: 64
End: 2019-01-15

## 2019-01-15 NOTE — TELEPHONE ENCOUNTER
Received request for an H&P to be done for pt's Left substernal lobectomy possible total thyroidectomy-with cariology with Dr Eleanor العلي.     Called to pt, reached era [employer] for pt to  and ask for [facility], [place]

## 2019-01-17 ENCOUNTER — OFFICE VISIT (OUTPATIENT)
Dept: PHYSICAL THERAPY | Age: 64
End: 2019-01-17
Attending: PHYSICIAN ASSISTANT
Payer: MEDICARE

## 2019-01-17 PROCEDURE — 97110 THERAPEUTIC EXERCISES: CPT

## 2019-01-17 PROCEDURE — 97112 NEUROMUSCULAR REEDUCATION: CPT

## 2019-01-17 NOTE — PROGRESS NOTES
Dx:  s/p C3-7 ACDF , fall/balance impairments, neck pain/stiffness           Authorized # of Visits:  60/year         Next MD visit: 4 weeks   Fall Risk: standard       Precautions: see dx           Discharge Summary:   Pt attended 7 visits in Physical The ascend/descend 1 flight of stairs reciprocally with light use of handrail (8 visits)  · Pt will demonstrate normalize AROM cervical spine appropriate for his post operative status in order to check blindspot without pain or restriction to be met in 8 visit 10 reps bilaterally Yellow band EV 12 reps x 2 sets R/L Yellow band EV 25 reps R/L Red band EV 25 reps R/L X 25 reps -    Standing double leg heel raises 20 reps Seated DF 10 sec hold 10 reps bilaterally Manual DF eccentric contraction 10 reps R/L Seated D glide, standing DF single leg   1/2/2019 sit to stand SLS  Charges:  There ex: 2; neuro fifi: 1       Total Timed Treatment: 45 min  Total Treatment Time: 45 min

## 2019-01-18 ENCOUNTER — PRIOR ORIGINAL RECORDS (OUTPATIENT)
Dept: OTHER | Age: 64
End: 2019-01-18

## 2019-01-22 ENCOUNTER — PRIOR ORIGINAL RECORDS (OUTPATIENT)
Dept: OTHER | Age: 64
End: 2019-01-22

## 2019-01-23 ENCOUNTER — LAB ENCOUNTER (OUTPATIENT)
Dept: LAB | Age: 64
End: 2019-01-23
Attending: FAMILY MEDICINE
Payer: MEDICARE

## 2019-01-23 ENCOUNTER — OFFICE VISIT (OUTPATIENT)
Dept: FAMILY MEDICINE CLINIC | Facility: CLINIC | Age: 64
End: 2019-01-23
Payer: MEDICARE

## 2019-01-23 VITALS
RESPIRATION RATE: 12 BRPM | DIASTOLIC BLOOD PRESSURE: 80 MMHG | TEMPERATURE: 97 F | WEIGHT: 175 LBS | BODY MASS INDEX: 23.7 KG/M2 | SYSTOLIC BLOOD PRESSURE: 120 MMHG | OXYGEN SATURATION: 98 % | HEART RATE: 56 BPM | HEIGHT: 72 IN

## 2019-01-23 DIAGNOSIS — E07.9 THYROID DYSFUNCTION: ICD-10-CM

## 2019-01-23 DIAGNOSIS — I10 ESSENTIAL HYPERTENSION: ICD-10-CM

## 2019-01-23 DIAGNOSIS — E04.1 THYROID NODULE: ICD-10-CM

## 2019-01-23 DIAGNOSIS — Z01.818 PREOPERATIVE CLEARANCE: Primary | ICD-10-CM

## 2019-01-23 DIAGNOSIS — I42.0 DILATED CARDIOMYOPATHY (HCC): ICD-10-CM

## 2019-01-23 LAB
ALBUMIN SERPL-MCNC: 3.6 G/DL (ref 3.1–4.5)
ALBUMIN/GLOB SERPL: 1.1 {RATIO} (ref 1–2)
ALP LIVER SERPL-CCNC: 87 U/L (ref 45–117)
ALT SERPL-CCNC: 64 U/L (ref 17–63)
ANION GAP SERPL CALC-SCNC: 7 MMOL/L (ref 0–18)
AST SERPL-CCNC: 47 U/L (ref 15–41)
BASOPHILS # BLD AUTO: 0.02 X10(3) UL (ref 0–0.1)
BASOPHILS NFR BLD AUTO: 0.4 %
BILIRUB SERPL-MCNC: 1.1 MG/DL (ref 0.1–2)
BUN BLD-MCNC: 21 MG/DL (ref 8–20)
BUN/CREAT SERPL: 30.4 (ref 10–20)
CALCIUM BLD-MCNC: 9 MG/DL (ref 8.3–10.3)
CHLORIDE SERPL-SCNC: 107 MMOL/L (ref 101–111)
CO2 SERPL-SCNC: 28 MMOL/L (ref 22–32)
CREAT BLD-MCNC: 0.69 MG/DL (ref 0.7–1.3)
EOSINOPHIL # BLD AUTO: 0.2 X10(3) UL (ref 0–0.3)
EOSINOPHIL NFR BLD AUTO: 3.8 %
ERYTHROCYTE [DISTWIDTH] IN BLOOD BY AUTOMATED COUNT: 13.2 % (ref 11.5–16)
GLOBULIN PLAS-MCNC: 3.4 G/DL (ref 2.8–4.4)
GLUCOSE BLD-MCNC: 94 MG/DL (ref 70–99)
HCT VFR BLD AUTO: 42.8 % (ref 37–53)
HGB BLD-MCNC: 13.7 G/DL (ref 13–17)
IMMATURE GRANULOCYTE COUNT: 0.01 X10(3) UL (ref 0–1)
IMMATURE GRANULOCYTE RATIO %: 0.2 %
LYMPHOCYTES # BLD AUTO: 1.41 X10(3) UL (ref 0.9–4)
LYMPHOCYTES NFR BLD AUTO: 27.1 %
M PROTEIN MFR SERPL ELPH: 7 G/DL (ref 6.4–8.2)
MCH RBC QN AUTO: 30 PG (ref 27–33.2)
MCHC RBC AUTO-ENTMCNC: 32 G/DL (ref 31–37)
MCV RBC AUTO: 93.9 FL (ref 80–99)
MONOCYTES # BLD AUTO: 0.79 X10(3) UL (ref 0.1–1)
MONOCYTES NFR BLD AUTO: 15.2 %
NEUTROPHIL ABS PRELIM: 2.77 X10 (3) UL (ref 1.3–6.7)
NEUTROPHILS # BLD AUTO: 2.77 X10(3) UL (ref 1.3–6.7)
NEUTROPHILS NFR BLD AUTO: 53.3 %
OSMOLALITY SERPL CALC.SUM OF ELEC: 297 MOSM/KG (ref 275–295)
PLATELET # BLD AUTO: 180 10(3)UL (ref 150–450)
POTASSIUM SERPL-SCNC: 4.5 MMOL/L (ref 3.6–5.1)
PTH-INTACT SERPL-MCNC: 60.9 PG/ML (ref 11.1–79.5)
RBC # BLD AUTO: 4.56 X10(6)UL (ref 4.3–5.7)
RED CELL DISTRIBUTION WIDTH-SD: 45.2 FL (ref 35.1–46.3)
SODIUM SERPL-SCNC: 142 MMOL/L (ref 136–144)
WBC # BLD AUTO: 5.2 X10(3) UL (ref 4–13)

## 2019-01-23 PROCEDURE — 80053 COMPREHEN METABOLIC PANEL: CPT

## 2019-01-23 PROCEDURE — 83970 ASSAY OF PARATHORMONE: CPT

## 2019-01-23 PROCEDURE — 99214 OFFICE O/P EST MOD 30 MIN: CPT | Performed by: FAMILY MEDICINE

## 2019-01-23 PROCEDURE — 85025 COMPLETE CBC W/AUTO DIFF WBC: CPT

## 2019-01-23 PROCEDURE — 36415 COLL VENOUS BLD VENIPUNCTURE: CPT

## 2019-01-23 NOTE — PROGRESS NOTES
[facility] Family Medicine Office Note  Chief Complaint:   Patient presents with:  Pre-Op Exam: 2/13/19 , Dr Zabrina John, left subternal lobectoy,poss total thyroidectomy      HPI:   This is a 58year old male coming in for preop medical clearance Neuropathy     feet   • Nocturia 9/8/2015   • Nose disorder    • Osteoarthritis    • Osteoporosis    • Other and unspecified hyperlipidemia    • Pacemaker    • Paralyzed vocal cords     right   • Problems with swallowing    • [facility] 10/20/20 6-7. MICRODISCECTOMY, INTERBODY FUSION WITH PLATE AND ALLOGRAFT SCREWS N/A    • OTHER SURGICAL HISTORY Right 07/2007    FOOT SX   • SACROILIAC JOINT INJECTION BILATERAL Bilateral 11/17/2015    Performed by Luz Perez MD at [facility] MAIN OR   • [address] [facility] ([address]) MCG/ACT Inhalation Aero Soln Inhale 2 puffs into the lungs every 4 (four) hours as needed. Disp:  Rfl:    Mometasone Furo-Formoterol Fum (DULERA) 200-5 MCG/ACT Inhalation Aerosol Inhale 2 puffs into the lungs 2 (two) times daily.  Disp:  Rfl: normal in all 4 quadrants, no hepatosplenomegaly  EXTREMITIES:  Strength intact with 5/5 bilaterally upper and lower extremities, no edema noted  NEURO:  CN 2 - 12 grossly intact     ASSESSMENT AND PLAN:   1.  Preoperative clearance  -  Pending lab results, Tinea corporis     Chest pain     Coronary artery disease involving native heart without angina pectoris     ICD (implantable cardioverter-defibrillator) in place     Allergic dermatitis     Costochondral chest pain     Hypertrophy of prostate with urinary

## 2019-01-24 ENCOUNTER — OFFICE VISIT (OUTPATIENT)
Dept: NEUROLOGY | Facility: CLINIC | Age: 64
End: 2019-01-24
Payer: MEDICARE

## 2019-01-24 DIAGNOSIS — G62.9 POLYNEUROPATHY: ICD-10-CM

## 2019-01-24 DIAGNOSIS — R74.8 ELEVATED LIVER ENZYMES: Primary | ICD-10-CM

## 2019-01-24 PROCEDURE — 95886 MUSC TEST DONE W/N TEST COMP: CPT | Performed by: OTHER

## 2019-01-24 PROCEDURE — 95909 NRV CNDJ TST 5-6 STUDIES: CPT | Performed by: OTHER

## 2019-01-24 NOTE — PROGRESS NOTES
Based on stable labs, patient is medically clear for left substernal lobectomy with possible total thyroidectomy to be done by Dr. Zev Leal at [facility] February 13, 2019.   He will need to hold his xarelto according to instructions provided by

## 2019-01-25 ENCOUNTER — PRIOR ORIGINAL RECORDS (OUTPATIENT)
Dept: OTHER | Age: 64
End: 2019-01-25

## 2019-01-28 ENCOUNTER — PRIOR ORIGINAL RECORDS (OUTPATIENT)
Dept: OTHER | Age: 64
End: 2019-01-28

## 2019-01-29 ENCOUNTER — PRIOR ORIGINAL RECORDS (OUTPATIENT)
Dept: OTHER | Age: 64
End: 2019-01-29

## 2019-01-29 ENCOUNTER — APPOINTMENT (OUTPATIENT)
Dept: LAB | Age: 64
End: 2019-01-29
Attending: FAMILY MEDICINE
Payer: MEDICARE

## 2019-01-29 ENCOUNTER — OFFICE VISIT (OUTPATIENT)
Dept: FAMILY MEDICINE CLINIC | Facility: CLINIC | Age: 64
End: 2019-01-29
Payer: MEDICARE

## 2019-01-29 VITALS
RESPIRATION RATE: 14 BRPM | WEIGHT: 174 LBS | DIASTOLIC BLOOD PRESSURE: 70 MMHG | BODY MASS INDEX: 23.57 KG/M2 | HEIGHT: 72 IN | HEART RATE: 72 BPM | TEMPERATURE: 97 F | SYSTOLIC BLOOD PRESSURE: 120 MMHG

## 2019-01-29 DIAGNOSIS — F51.01 PRIMARY INSOMNIA: ICD-10-CM

## 2019-01-29 DIAGNOSIS — M19.012 PRIMARY OSTEOARTHRITIS OF LEFT SHOULDER: ICD-10-CM

## 2019-01-29 DIAGNOSIS — I10 ESSENTIAL HYPERTENSION WITH GOAL BLOOD PRESSURE LESS THAN 130/80: ICD-10-CM

## 2019-01-29 DIAGNOSIS — Z00.00 ENCOUNTER FOR MEDICARE ANNUAL WELLNESS EXAM: Primary | ICD-10-CM

## 2019-01-29 DIAGNOSIS — C61 PROSTATE CANCER (HCC): ICD-10-CM

## 2019-01-29 DIAGNOSIS — Z12.5 SCREENING FOR PROSTATE CANCER: ICD-10-CM

## 2019-01-29 DIAGNOSIS — I25.10 CORONARY ARTERY DISEASE INVOLVING NATIVE HEART WITHOUT ANGINA PECTORIS, UNSPECIFIED VESSEL OR LESION TYPE: ICD-10-CM

## 2019-01-29 LAB
CHOLEST SMN-MCNC: 132 MG/DL (ref ?–200)
HDLC SERPL-MCNC: 57 MG/DL (ref 40–59)
LDLC SERPL CALC-MCNC: 66 MG/DL (ref ?–100)
NONHDLC SERPL-MCNC: 75 MG/DL (ref ?–130)
PSA SERPL-MCNC: 0.23 NG/ML (ref 0.01–4)
TRIGL SERPL-MCNC: 46 MG/DL (ref 30–149)
VLDLC SERPL CALC-MCNC: 9 MG/DL (ref 0–30)

## 2019-01-29 PROCEDURE — G0102 PROSTATE CA SCREENING; DRE: HCPCS | Performed by: FAMILY MEDICINE

## 2019-01-29 PROCEDURE — 36415 COLL VENOUS BLD VENIPUNCTURE: CPT

## 2019-01-29 PROCEDURE — G0438 PPPS, INITIAL VISIT: HCPCS | Performed by: FAMILY MEDICINE

## 2019-01-29 PROCEDURE — 84153 ASSAY OF PSA TOTAL: CPT

## 2019-01-29 PROCEDURE — 80061 LIPID PANEL: CPT

## 2019-01-29 NOTE — PATIENT INSTRUCTIONS
Favio Watson's SCREENING SCHEDULE   Tests on this list are recommended by your physician but may not be covered, or covered at this frequency, by your insurer. Please check with your insurance carrier before scheduling to verify coverage.     URBAN criteria:   • Men who are 73-68 years old and have smoked more than 100 cigarettes in their lifetime   • Anyone with a family history    Colorectal Cancer Screening Covered up to Age 76     Colonoscopy Screen   Covered every 10 years- more often if abnorma Illicit injectable drug abusers     Tetanus Toxoid- Only covered with a cut with metal- TD and TDaP Not covered by Medicare Part B) Orders placed or performed in visit on 10/19/16   • TETANUS, DIPHTHERIA TOXOIDS AND ACELLULAR PERTUSIS VACCINE (TDAP), >7

## 2019-01-29 NOTE — PROGRESS NOTES
HPI:   Pippa Frank is a 58year old male who presents for a Medicare Initial Preventative Physical Exam (Welcome to Medicare- < 12 months on Medicare). Patient is here for a welcome to Medicare visit.   His only complaint today is continued left sh (over the last two weeks)?: Not at all  PHQ-2 SCORE: 0     Advanced Directive:   He does NOT have a Living Will [facility]    Advance care planning including the explanation and discussion of advance directives standard forms performed Face to Face wi (CARDIOLOGY)  Chele Augustine, SLP as Speech Language Pathologist (Speech-Language Pathologist)  Igor Bustillo, PTA (Physical Therapy)  Lisa Morse, DO as Consulting Physician (NEUROLOGY)  Jaden Dos Santos MD as Consulting Physician [facility] Value Date    AST 47 (H) 01/23/2019    ALT 64 (H) 01/23/2019    CA 9.0 01/23/2019    ALB 3.6 01/23/2019    TSH 3.630 05/10/2018    CREATSERUM 0.69 (L) 01/23/2019    GLU 94 01/23/2019        CBC  (most recent labs)   Lab Results   Component Value Date    WB Extrinsic asthma, unspecified, Foot drop, bilateral, Frequent urination, Gallbladder problem, H/O gastric bypass (08/14/2017), Heart palpitations, High blood pressure, High cholesterol, History of blood clots, Hypertrophy of prostate with urinary obstructi in his father and mother; Other in his father, mother, and sister; benign prostatic hypertrophy in his father. SOCIAL HISTORY:   He  reports that  has never smoked. he has never used smokeless tobacco. He reports that he drinks alcohol.  He reports that h curvature, ROM normal, no CVA tenderness   Lungs:   Clear to auscultation bilaterally, respirations unlabored   Chest Wall:  No tenderness or deformity   Heart:  Regular rate and rhythm, S1, S2 normal, no murmur, rub or gallop   Abdomen:   Soft, non-tender Cannot take nsaids due to blood thinner  -  Refer to ortho for possible steroid injection or arthroscopy    Screening for prostate cancer  -     PROSTATE CANCER SCREEN/DIGITAL  -  Prostate exam done today    Primary Insomnia  -  Trial of melatonin  -  Good found for this or any previous visit. No flowsheet data found. Fecal Occult Blood Annually No results found for: FOB No flowsheet data found.     Glaucoma Screening      Ophthalmology Visit Annually: Diabetics, FHx Glaucoma, AA>50, > 65 No flows

## 2019-01-31 NOTE — PROCEDURES
[facility]  [address] [facility]  Phone- [phone]  Nerve Conduction & Electromyography Report            Patient: Arturo Amador  Patient ID: [id number]  Sex:  Male  YOB: 1955  Age: 58 Ye MARIAMA.  FAUSTOON LONGUS N None None None None N N N N   L. EXT WEAVER LONG 1+ None 1+ None None N N N N   R. TIB ANTERIOR N None None None None N N N N   R. GASTROCN (MED) N None None None None N N N N   R. VAST MEDIALIS N None None None None N N N N   R. EXT WEAVER L

## 2019-02-02 ENCOUNTER — ANESTHESIA EVENT (OUTPATIENT)
Dept: SURGERY | Facility: HOSPITAL | Age: 64
End: 2019-02-02

## 2019-02-07 NOTE — ICD/PM
A representative from [employer] (1 800 CARDIAC) will be available for ICD management prior to thyroid surgery.     Shena Louis NP

## 2019-02-13 ENCOUNTER — ANESTHESIA (OUTPATIENT)
Dept: SURGERY | Facility: HOSPITAL | Age: 64
End: 2019-02-13

## 2019-02-13 ENCOUNTER — HOSPITAL ENCOUNTER (OUTPATIENT)
Facility: HOSPITAL | Age: 64
Setting detail: OBSERVATION
Discharge: HOME | End: 2019-02-14
Attending: OTOLARYNGOLOGY | Admitting: OTOLARYNGOLOGY
Payer: MEDICARE

## 2019-02-13 DIAGNOSIS — R13.10 PROBLEMS WITH SWALLOWING AND MASTICATION: ICD-10-CM

## 2019-02-13 DIAGNOSIS — E04.1 NONTOXIC UNINODULAR GOITER: ICD-10-CM

## 2019-02-13 DIAGNOSIS — E87.5 HYPERKALEMIA: Primary | ICD-10-CM

## 2019-02-13 DIAGNOSIS — J38.00 PARALYSIS OF LARYNX: ICD-10-CM

## 2019-02-13 DIAGNOSIS — R49.8 NEUROLOGIC VOICE DISORDER: ICD-10-CM

## 2019-02-13 PROCEDURE — 88333 PATH CONSLTJ SURG CYTO XM 1: CPT | Performed by: OTOLARYNGOLOGY

## 2019-02-13 PROCEDURE — 88307 TISSUE EXAM BY PATHOLOGIST: CPT | Performed by: OTOLARYNGOLOGY

## 2019-02-13 PROCEDURE — 4A1174G MONITORING OF PERIPHERAL NERVOUS ELECTRICAL ACTIVITY, INTRAOPERATIVE, VIA NATURAL OR ARTIFICIAL OPENING: ICD-10-PCS | Performed by: OTOLARYNGOLOGY

## 2019-02-13 PROCEDURE — 0GTG0ZZ RESECTION OF LEFT THYROID GLAND LOBE, OPEN APPROACH: ICD-10-PCS | Performed by: OTOLARYNGOLOGY

## 2019-02-13 PROCEDURE — 88331 PATH CONSLTJ SURG 1 BLK 1SPC: CPT | Performed by: OTOLARYNGOLOGY

## 2019-02-13 RX ORDER — MONTELUKAST SODIUM 10 MG/1
10 TABLET ORAL NIGHTLY
Status: DISCONTINUED | OUTPATIENT
Start: 2019-02-13 | End: 2019-02-14

## 2019-02-13 RX ORDER — NEBIVOLOL 2.5 MG/1
2.5 TABLET ORAL
Status: DISCONTINUED | OUTPATIENT
Start: 2019-02-13 | End: 2019-02-14

## 2019-02-13 RX ORDER — ACETAMINOPHEN 325 MG/1
650 TABLET ORAL EVERY 4 HOURS PRN
Status: DISCONTINUED | OUTPATIENT
Start: 2019-02-13 | End: 2019-02-14

## 2019-02-13 RX ORDER — HYDROMORPHONE HYDROCHLORIDE 1 MG/ML
0.4 INJECTION, SOLUTION INTRAMUSCULAR; INTRAVENOUS; SUBCUTANEOUS EVERY 5 MIN PRN
Status: DISCONTINUED | OUTPATIENT
Start: 2019-02-13 | End: 2019-02-13 | Stop reason: HOSPADM

## 2019-02-13 RX ORDER — DEXTROSE, SODIUM CHLORIDE, SODIUM LACTATE, POTASSIUM CHLORIDE, AND CALCIUM CHLORIDE 5; .6; .31; .03; .02 G/100ML; G/100ML; G/100ML; G/100ML; G/100ML
INJECTION, SOLUTION INTRAVENOUS CONTINUOUS
Status: DISCONTINUED | OUTPATIENT
Start: 2019-02-13 | End: 2019-02-14

## 2019-02-13 RX ORDER — BUPIVACAINE HYDROCHLORIDE AND EPINEPHRINE 5; 5 MG/ML; UG/ML
INJECTION, SOLUTION EPIDURAL; INTRACAUDAL; PERINEURAL AS NEEDED
Status: DISCONTINUED | OUTPATIENT
Start: 2019-02-13 | End: 2019-02-13 | Stop reason: HOSPADM

## 2019-02-13 RX ORDER — HYDROCODONE BITARTRATE AND ACETAMINOPHEN 5; 325 MG/1; MG/1
1 TABLET ORAL AS NEEDED
Status: DISCONTINUED | OUTPATIENT
Start: 2019-02-13 | End: 2019-02-13 | Stop reason: HOSPADM

## 2019-02-13 RX ORDER — HYDROCODONE BITARTRATE AND ACETAMINOPHEN 5; 325 MG/1; MG/1
1 TABLET ORAL EVERY 4 HOURS PRN
Status: DISCONTINUED | OUTPATIENT
Start: 2019-02-13 | End: 2019-02-14

## 2019-02-13 RX ORDER — ONDANSETRON 2 MG/ML
4 INJECTION INTRAMUSCULAR; INTRAVENOUS AS NEEDED
Status: DISCONTINUED | OUTPATIENT
Start: 2019-02-13 | End: 2019-02-13 | Stop reason: HOSPADM

## 2019-02-13 RX ORDER — ALBUTEROL SULFATE 90 UG/1
2 AEROSOL, METERED RESPIRATORY (INHALATION) EVERY 4 HOURS PRN
Status: DISCONTINUED | OUTPATIENT
Start: 2019-02-13 | End: 2019-02-14

## 2019-02-13 RX ORDER — HYDROCODONE BITARTRATE AND ACETAMINOPHEN 5; 325 MG/1; MG/1
2 TABLET ORAL AS NEEDED
Status: DISCONTINUED | OUTPATIENT
Start: 2019-02-13 | End: 2019-02-13 | Stop reason: HOSPADM

## 2019-02-13 RX ORDER — ACETAMINOPHEN 500 MG
1000 TABLET ORAL ONCE
Status: DISCONTINUED | OUTPATIENT
Start: 2019-02-13 | End: 2019-02-13

## 2019-02-13 RX ORDER — MEXILETINE HYDROCHLORIDE 150 MG/1
150 CAPSULE ORAL 2 TIMES DAILY
Status: DISCONTINUED | OUTPATIENT
Start: 2019-02-13 | End: 2019-02-14

## 2019-02-13 RX ORDER — NALOXONE HYDROCHLORIDE 0.4 MG/ML
80 INJECTION, SOLUTION INTRAMUSCULAR; INTRAVENOUS; SUBCUTANEOUS AS NEEDED
Status: DISCONTINUED | OUTPATIENT
Start: 2019-02-13 | End: 2019-02-13 | Stop reason: HOSPADM

## 2019-02-13 RX ORDER — SODIUM CHLORIDE, SODIUM LACTATE, POTASSIUM CHLORIDE, CALCIUM CHLORIDE 600; 310; 30; 20 MG/100ML; MG/100ML; MG/100ML; MG/100ML
INJECTION, SOLUTION INTRAVENOUS CONTINUOUS
Status: DISCONTINUED | OUTPATIENT
Start: 2019-02-13 | End: 2019-02-14

## 2019-02-13 RX ORDER — HYDROCODONE BITARTRATE AND ACETAMINOPHEN 5; 325 MG/1; MG/1
2 TABLET ORAL EVERY 4 HOURS PRN
Status: DISCONTINUED | OUTPATIENT
Start: 2019-02-13 | End: 2019-02-14

## 2019-02-13 RX ORDER — ONDANSETRON 2 MG/ML
4 INJECTION INTRAMUSCULAR; INTRAVENOUS EVERY 6 HOURS PRN
Status: DISCONTINUED | OUTPATIENT
Start: 2019-02-13 | End: 2019-02-14

## 2019-02-13 RX ORDER — SODIUM CHLORIDE, SODIUM LACTATE, POTASSIUM CHLORIDE, CALCIUM CHLORIDE 600; 310; 30; 20 MG/100ML; MG/100ML; MG/100ML; MG/100ML
INJECTION, SOLUTION INTRAVENOUS CONTINUOUS
Status: DISCONTINUED | OUTPATIENT
Start: 2019-02-13 | End: 2019-02-13 | Stop reason: HOSPADM

## 2019-02-13 NOTE — ANESTHESIA POSTPROCEDURE EVALUATION
[address] [facility] Patient Status:  Outpatient in a Bed   Age/Gender 58year old male MRN [id number]   Location [address] [facility] Attending Lawanda Mazariegos MD   Hosp Day # 0 PCP THEO SOSA DO       Anesthes

## 2019-02-13 NOTE — OPERATIVE REPORT
[facility]    PATIENT'S NAME: Fran Kan   ATTENDING PHYSICIAN: Matti Castro M.D. OPERATING PHYSICIAN: Matti Castro M.D.    PATIENT ACCOUNT#:   [de-identified]    LOCATION:  PACU [facility] PACU 6 EDWP 10  MEDICAL RECORD #:   [id number]       DATE fingerbreadths above the sternal notch. Subplatysmal flaps were elevated superiorly and inferiorly. Strap muscles were divided in the midline. The left thyroid lobe was mobilized using titanium clips and bipolar cautery for hemostasis.   The recurrent la

## 2019-02-13 NOTE — BRIEF OP NOTE
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 0]Paralysis of jenny

## 2019-02-13 NOTE — ANESTHESIA PREPROCEDURE EVALUATION
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]    Procedure(s):  LEFT SUBSTERNAL (+) dysrhythmias   (+) CHF                Endo/Other                                  Pulmonary        (+) COPD            (+) sleep apnea and CPAP      Neuro/Psych  Comment: S/p cervical fusion                                  Abdominal pain, unspecif N/A 5/7/2015    Performed by Chante Aranda MD at [facility] ENDOSCOPY   • ESOPHAGOGASTRODUODENOSCOPY, POSSIBLE BIOPSY, POSSIBLE POLYPECTOMY 92958 N/A 8/24/2016    Performed by Chante Aranda MD at [address] [place] [address] Lab Results   Component Value Date     01/23/2019    K 4.5 01/23/2019     01/23/2019    CO2 28.0 01/23/2019    BUN 21 (H) 01/23/2019    CREATSERUM 0.69 (L) 01/23/2019    GLU 94 01/23/2019    CA 9.0 01/23/2019            Airway      [place]

## 2019-02-13 NOTE — PROGRESS NOTES
NURSING ADMISSION NOTE      Patient admitted via Cart  Oriented to room. Safety precautions initiated. Bed in low position. Call light in reach. RECEIVED PATIENT FROM RECOVERY ROOM .  ALERT AND ORIENT X 3 , ON ROOM AIR , LEMUEL , TELE   SR , DENIES  ANY

## 2019-02-14 VITALS
DIASTOLIC BLOOD PRESSURE: 55 MMHG | TEMPERATURE: 98 F | WEIGHT: 174.63 LBS | BODY MASS INDEX: 23.65 KG/M2 | OXYGEN SATURATION: 98 % | HEART RATE: 73 BPM | HEIGHT: 72 IN | RESPIRATION RATE: 18 BRPM | SYSTOLIC BLOOD PRESSURE: 101 MMHG

## 2019-02-14 LAB
ANION GAP SERPL CALC-SCNC: 6 MMOL/L (ref 0–18)
BUN BLD-MCNC: 12 MG/DL (ref 7–18)
BUN/CREAT SERPL: 21.8 (ref 10–20)
CALCIUM BLD-MCNC: 8.6 MG/DL (ref 8.5–10.1)
CHLORIDE SERPL-SCNC: 108 MMOL/L (ref 98–107)
CO2 SERPL-SCNC: 30 MMOL/L (ref 21–32)
CREAT BLD-MCNC: 0.55 MG/DL (ref 0.7–1.3)
GLUCOSE BLD-MCNC: 124 MG/DL (ref 70–99)
HAV IGM SER QL: 1.9 MG/DL (ref 1.6–2.6)
HAV IGM SER QL: 2.3 MG/DL (ref 1.6–2.6)
OSMOLALITY SERPL CALC.SUM OF ELEC: 299 MOSM/KG (ref 275–295)
POTASSIUM SERPL-SCNC: 4.8 MMOL/L (ref 3.5–5.1)
POTASSIUM SERPL-SCNC: 5.9 MMOL/L (ref 3.5–5.1)
SODIUM SERPL-SCNC: 144 MMOL/L (ref 136–145)

## 2019-02-14 PROCEDURE — 96375 TX/PRO/DX INJ NEW DRUG ADDON: CPT

## 2019-02-14 PROCEDURE — 83735 ASSAY OF MAGNESIUM: CPT | Performed by: OTOLARYNGOLOGY

## 2019-02-14 PROCEDURE — 96365 THER/PROPH/DIAG IV INF INIT: CPT

## 2019-02-14 PROCEDURE — 83735 ASSAY OF MAGNESIUM: CPT | Performed by: INTERNAL MEDICINE

## 2019-02-14 PROCEDURE — 96366 THER/PROPH/DIAG IV INF ADDON: CPT

## 2019-02-14 PROCEDURE — 80048 BASIC METABOLIC PNL TOTAL CA: CPT | Performed by: INTERNAL MEDICINE

## 2019-02-14 PROCEDURE — 84132 ASSAY OF SERUM POTASSIUM: CPT | Performed by: INTERNAL MEDICINE

## 2019-02-14 RX ORDER — SODIUM POLYSTYRENE SULFONATE 15 G/60ML
15 SUSPENSION ORAL; RECTAL ONCE
Status: COMPLETED | OUTPATIENT
Start: 2019-02-14 | End: 2019-02-14

## 2019-02-14 RX ORDER — MAGNESIUM SULFATE HEPTAHYDRATE 40 MG/ML
2 INJECTION, SOLUTION INTRAVENOUS ONCE
Status: COMPLETED | OUTPATIENT
Start: 2019-02-14 | End: 2019-02-14

## 2019-02-14 NOTE — PROGRESS NOTES
2308: per tele tech, pt had 3 beats vtach, frequent PVCs. Dr. Julio Zhu paged. Waiting for call back. 2343: Per Dr. Aquiles Hutchinson consult cardiology. Dr. Suyapa Peterson paged. Waiting for call back. 9786: Mag 1.9, potassium 5.9. Dr. Buzz Stewart paged.  Waiting for call

## 2019-02-14 NOTE — PROGRESS NOTES
NURSING DISCHARGE NOTE    Discharged Home via Ambulatory. Accompanied by Family member and Support staff  Belongings Taken by patient/family DISCUSSED D/CINSTRUCTIONS WITH PATIENT AND FAMILY . ANSWERED ALL QUESTIONS . VERBALIZED UNDERSTANDING . Kasey Ruvalcaba

## 2019-02-14 NOTE — PROGRESS NOTES
[facility]  Cardiology Progress Note    [place] Patient Status:  Observation    10/12/1955 MRN [id number]   [facility] 3NW-A Attending Caitlin De Paz MD   Hosp Day # 0 PCP THEO SOSA DO     Subjective:  No complaint when ok with surgery.     HOWARD Garcia  2/14/2019  1:24 PM

## 2019-02-14 NOTE — PROGRESS NOTES
Patient is awake and alert s/p left thyroid lobectomy Voice is strong, denies any pain, dysphagia, numbness, tingling or muscle cramping. Pain is well tolerated. Eating and drinking is well tolerated.   Overall no significant complaints, patient doing very

## 2019-02-14 NOTE — HISTORICAL OFFICE NOTE
Nessa Barber  : 10/12/1955  ACCOUNT:  [id number]  [id number]  PCP: Dr. Ginger Palacios DATE: 2019  DICTATED BY:  [Dr. Pelayo Less      CHIEF COMPLAINT: [Followup of .  CAD, of native vessels, nonobstructive, Followup of Hypercholesterem cpap, pe after bicep surgery and rt knee arthroscopy; aug 2017: gastric bypass surgery    PAST CV HISTORY: 2015: ct abd: no aaa, cath may 2009: lvef 40%, mild cad, ICD [place]) 9/3/09 with gen change 2017 and paf 1% 2017- xarelto added per dr Kain Tinsley of native vessels, nonobstructive  3. Dilated cardiomyopathy  4. Hypercholesteremia, pure  5. Hypertension, benign  6. Occlusion and stenosis of bilateral carotid arteries      PLAN:  [1.  Cut Bystolic down to 5 mg nightly.   2.  Lipid profile with chemistr

## 2019-02-14 NOTE — PLAN OF CARE
Pt a&o, vss. Anterior neck incision c/d/i. Norco for pain. Voids without difficulty. Tolerating fulls, will advance as tolerated. IVF infusing. Denies any other needs at this time. Will continue to monitor.      GASTROINTESTINAL - ADULT    • Minimal or abse

## 2019-02-14 NOTE — CONSULTS
[address] NOTE  Cardiology Consultation    Elif Simmons Patient Status:  Observation    10/12/1955 MRN [id number]   [facility] 3NW-A Attending Vinicius De La Cruz MD   Hosp Day # 0 PCP THEO SOSA, DO     Reason for C of stomach, intestines, and rectum     Prostate cancer ([address]     LEMUEL on CPAP     History of colon polyps     Diverticulosis of large intestine without hemorrhage     Dilated cardiomyopathy ([address]     H/O gastric bypass     Tension type headache     [place]ig ([facility] 2014    post-op bicep tendon repair   • Unspecified essential hypertension    • Unspecified sleep apnea     cpap, no longer needs after weight loss   • -tach [facility])    • Visual impairment     glasses   • Vitamin B6 deficiency    • Wears glasses    • SHOULDER ARTHROSCOPY Right 2014    AT [facility] R SHOULDER BICEP TENDON REPAIR,    • SPINE SURGERY PROCEDURE UNLISTED  03/2011    CERVICAL FUSION/HARDWARE   • STELLATE GANGLION BLOCK Left 1/7/2016    Performed by Emilia Ozuna MD at [facility] MAIN OR   • TRIGGER PO Inhale 18 mcg into the lungs daily. Disp:  Rfl:  2/12/2019 at 2000   Nebivolol HCl (BYSTOLIC) 5 MG Oral Tab Take 2.5 mg by mouth every morning.    Disp:  Rfl:  2/12/2019 at 1200   betamethasone dipropionate 0.05 % External Cream Apply 1 Application topicall Intake/Output:     Intake/Output Summary (Last 24 hours) at 2/14/2019 0202  Last data filed at 2/14/2019 0143  Gross per 24 hour   Intake 1725 ml   Output 1435 ml   Net 290 ml     Intake/Output       02/12/19 0700 - 02/13/19 0659 (Not Admitted) 02/13

## 2019-02-15 ENCOUNTER — PATIENT OUTREACH (OUTPATIENT)
Dept: CASE MANAGEMENT | Age: 64
End: 2019-02-15

## 2019-02-15 DIAGNOSIS — R49.8 NEUROLOGIC VOICE DISORDER: ICD-10-CM

## 2019-02-15 DIAGNOSIS — Z02.9 ENCOUNTERS FOR UNSPECIFIED ADMINISTRATIVE PURPOSE: ICD-10-CM

## 2019-02-15 PROCEDURE — 1111F DSCHRG MED/CURRENT MED MERGE: CPT

## 2019-02-15 NOTE — PROGRESS NOTES
Initial Post Discharge Follow Up   Discharge Date: 2/14/19  Contact Date: 2/15/2019    Consent Verification:  Assessment Completed With: Patient  HIPAA Verified?   Yes    Discharge Dx:    Neurologic voice disorder, S/P left substernal left thyroid lobect Oral Cap Take 150 mg by mouth 2 (two) times daily. Disp:  Rfl:    NASCOBAL 500 MCG/0.1ML Nasal Solution by Nasal route every 7 days. Disp:  Rfl:    Montelukast Sodium (SINGULAIR) 10 MG Oral Tab Take 10 mg by mouth nightly.  Disp:  Rfl:    betamethasone di ([facility]        [address], ENT - [address] [facility] [address] [facility]  [address] [facility] [address] [place] [address] [address] [address]  . [address], [address] [facility] Yoav Brizuela

## 2019-02-19 ENCOUNTER — OFFICE VISIT (OUTPATIENT)
Dept: FAMILY MEDICINE CLINIC | Facility: CLINIC | Age: 64
End: 2019-02-19
Payer: MEDICARE

## 2019-02-19 VITALS
HEART RATE: 76 BPM | WEIGHT: 173 LBS | DIASTOLIC BLOOD PRESSURE: 80 MMHG | TEMPERATURE: 97 F | SYSTOLIC BLOOD PRESSURE: 118 MMHG | HEIGHT: 72 IN | BODY MASS INDEX: 23.43 KG/M2 | RESPIRATION RATE: 14 BRPM

## 2019-02-19 DIAGNOSIS — E04.1 LEFT THYROID NODULE: ICD-10-CM

## 2019-02-19 DIAGNOSIS — R49.8 NEUROLOGIC VOICE DISORDER: Primary | ICD-10-CM

## 2019-02-19 DIAGNOSIS — E89.0 STATUS POST PARTIAL THYROIDECTOMY: ICD-10-CM

## 2019-02-19 PROCEDURE — 1111F DSCHRG MED/CURRENT MED MERGE: CPT | Performed by: FAMILY MEDICINE

## 2019-02-19 PROCEDURE — 99495 TRANSJ CARE MGMT MOD F2F 14D: CPT | Performed by: FAMILY MEDICINE

## 2019-02-19 RX ORDER — SILDENAFIL 100 MG/1
100 TABLET, FILM COATED ORAL
Qty: 10 TABLET | Refills: 2 | Status: SHIPPED | OUTPATIENT
Start: 2019-02-19

## 2019-02-19 NOTE — PROGRESS NOTES
HPI:    Pepito Morales is a 58year old male here today for hospital follow up.    He was discharged from Inpatient hospital, [facility] to Home   Admission Date: 2/13/19   Discharge Date: 2/14/19  Hospital Discharge Diagnoses (since 1/20/2019)   Ne 120 METERED DOSES 220 MCG/INH Inhalation Aerosol Powder, Breath Activated Inhale 2 puffs into the lungs 2 (two) times daily. Mexiletine HCl 150 MG Oral Cap Take 150 mg by mouth 2 (two) times daily.    NASCOBAL 500 MCG/0.1ML Nasal Solution by Nasal route unspecified hyperlipidemia, Pacemaker, Paralyzed vocal cords, Problems with swallowing, [facility] (10/20/2016), Pulmonary embolism ([facility] (2014), Unspecified essential hypertension, Unspecified sleep apnea, V-tach ([facility] Visual impairment, Vitam sinus pain or ST  LUNGS: denies shortness of breath with exertion  CARDIOVASCULAR: denies chest pain on exertion or palpitations  GI: denies abdominal pain, denies heartburn, denies diarrhea  MUSCULOSKELETAL: denies pain, normal range of motion of extremit Certification:  I certify that the following are true:  Communication with the patient was made within 2 business days of discharge on date above   Medical Decision Making- Based on service period of discharge to 30 days:   · Number of Possible Diagnoses a

## 2019-02-20 ENCOUNTER — TELEPHONE (OUTPATIENT)
Dept: SURGERY | Facility: CLINIC | Age: 64
End: 2019-02-20

## 2019-02-27 ENCOUNTER — APPOINTMENT (OUTPATIENT)
Dept: LAB | Age: 64
End: 2019-02-27
Attending: FAMILY MEDICINE
Payer: MEDICARE

## 2019-02-27 ENCOUNTER — PRIOR ORIGINAL RECORDS (OUTPATIENT)
Dept: OTHER | Age: 64
End: 2019-02-27

## 2019-02-27 DIAGNOSIS — E53.1 VITAMIN B6 DEFICIENCY: ICD-10-CM

## 2019-02-27 DIAGNOSIS — R74.8 ELEVATED LIVER ENZYMES: ICD-10-CM

## 2019-02-27 DIAGNOSIS — E87.5 HYPERKALEMIA: ICD-10-CM

## 2019-02-27 LAB
ALBUMIN SERPL-MCNC: 3.5 G/DL (ref 3.4–5)
ALBUMIN/GLOB SERPL: 1 {RATIO} (ref 1–2)
ALP LIVER SERPL-CCNC: 93 U/L (ref 45–117)
ALT SERPL-CCNC: 59 U/L (ref 16–61)
ANION GAP SERPL CALC-SCNC: 9 MMOL/L (ref 0–18)
AST SERPL-CCNC: 41 U/L (ref 15–37)
BILIRUB SERPL-MCNC: 1.2 MG/DL (ref 0.1–2)
BUN BLD-MCNC: 25 MG/DL (ref 7–18)
BUN/CREAT SERPL: 33.3 (ref 10–20)
CALCIUM BLD-MCNC: 8.3 MG/DL (ref 8.5–10.1)
CHLORIDE SERPL-SCNC: 106 MMOL/L (ref 98–107)
CO2 SERPL-SCNC: 27 MMOL/L (ref 21–32)
CREAT BLD-MCNC: 0.75 MG/DL (ref 0.7–1.3)
GLOBULIN PLAS-MCNC: 3.5 G/DL (ref 2.8–4.4)
GLUCOSE BLD-MCNC: 77 MG/DL (ref 70–99)
M PROTEIN MFR SERPL ELPH: 7 G/DL (ref 6.4–8.2)
OSMOLALITY SERPL CALC.SUM OF ELEC: 297 MOSM/KG (ref 275–295)
POTASSIUM SERPL-SCNC: 3.8 MMOL/L (ref 3.5–5.1)
SODIUM SERPL-SCNC: 142 MMOL/L (ref 136–145)

## 2019-02-27 PROCEDURE — 80053 COMPREHEN METABOLIC PANEL: CPT

## 2019-02-27 PROCEDURE — 84207 ASSAY OF VITAMIN B-6: CPT

## 2019-02-27 PROCEDURE — 36415 COLL VENOUS BLD VENIPUNCTURE: CPT

## 2019-02-28 VITALS
WEIGHT: 171.8 LBS | HEART RATE: 68 BPM | BODY MASS INDEX: 23.27 KG/M2 | HEIGHT: 72 IN | SYSTOLIC BLOOD PRESSURE: 104 MMHG | DIASTOLIC BLOOD PRESSURE: 74 MMHG

## 2019-02-28 VITALS
HEART RATE: 72 BPM | WEIGHT: 190 LBS | DIASTOLIC BLOOD PRESSURE: 64 MMHG | HEIGHT: 72 IN | BODY MASS INDEX: 25.73 KG/M2 | SYSTOLIC BLOOD PRESSURE: 118 MMHG

## 2019-02-28 VITALS
WEIGHT: 274.3 LBS | SYSTOLIC BLOOD PRESSURE: 108 MMHG | BODY MASS INDEX: 37.15 KG/M2 | HEART RATE: 58 BPM | HEIGHT: 72 IN | DIASTOLIC BLOOD PRESSURE: 60 MMHG

## 2019-02-28 VITALS — WEIGHT: 192 LBS | HEART RATE: 76 BPM | SYSTOLIC BLOOD PRESSURE: 110 MMHG | DIASTOLIC BLOOD PRESSURE: 78 MMHG

## 2019-02-28 VITALS
HEIGHT: 72 IN | BODY MASS INDEX: 30.48 KG/M2 | WEIGHT: 225 LBS | HEART RATE: 60 BPM | SYSTOLIC BLOOD PRESSURE: 112 MMHG | DIASTOLIC BLOOD PRESSURE: 68 MMHG

## 2019-02-28 VITALS
SYSTOLIC BLOOD PRESSURE: 102 MMHG | WEIGHT: 174 LBS | BODY MASS INDEX: 23.57 KG/M2 | HEIGHT: 72 IN | DIASTOLIC BLOOD PRESSURE: 60 MMHG | HEART RATE: 79 BPM

## 2019-02-28 VITALS — DIASTOLIC BLOOD PRESSURE: 80 MMHG | SYSTOLIC BLOOD PRESSURE: 122 MMHG | RESPIRATION RATE: 16 BRPM | HEART RATE: 64 BPM

## 2019-02-28 LAB
ALBUMIN: 3.5 G/DL
ALKALINE PHOSPHATATE(ALK PHOS): 93 IU/L
BILIRUBIN TOTAL: 1.2 MG/DL
BUN: 25 MG/DL
CHLORIDE: 106 MEQ/L
CREATININE, SERUM: 0.75 MG/DL
GLOBULIN: 3.5 G/DL
GLUCOSE: 77 MG/DL
POTASSIUM, SERUM: 3.8 MEQ/L
PROTEIN, TOTAL: 7 G/DL
SGOT (AST): 41 IU/L
SGPT (ALT): 59 IU/L
SODIUM: 142 MEQ/L

## 2019-03-01 ENCOUNTER — PRIOR ORIGINAL RECORDS (OUTPATIENT)
Dept: OTHER | Age: 64
End: 2019-03-01

## 2019-03-01 VITALS
SYSTOLIC BLOOD PRESSURE: 132 MMHG | WEIGHT: 293 LBS | BODY MASS INDEX: 39.68 KG/M2 | DIASTOLIC BLOOD PRESSURE: 70 MMHG | HEIGHT: 72 IN | HEART RATE: 86 BPM

## 2019-03-01 VITALS
HEART RATE: 68 BPM | HEIGHT: 72 IN | SYSTOLIC BLOOD PRESSURE: 110 MMHG | DIASTOLIC BLOOD PRESSURE: 62 MMHG | WEIGHT: 292 LBS | BODY MASS INDEX: 39.55 KG/M2

## 2019-03-03 LAB — VITAMIN B6: 125.2 NMOL/L

## 2019-03-25 ENCOUNTER — LAB ENCOUNTER (OUTPATIENT)
Dept: LAB | Facility: HOSPITAL | Age: 64
End: 2019-03-25
Attending: FAMILY MEDICINE
Payer: MEDICARE

## 2019-03-25 ENCOUNTER — HOSPITAL ENCOUNTER (OUTPATIENT)
Dept: GENERAL RADIOLOGY | Facility: HOSPITAL | Age: 64
Discharge: HOME | End: 2019-03-25
Attending: FAMILY MEDICINE
Payer: MEDICARE

## 2019-03-25 ENCOUNTER — OFFICE VISIT (OUTPATIENT)
Dept: FAMILY MEDICINE CLINIC | Facility: CLINIC | Age: 64
End: 2019-03-25
Payer: MEDICARE

## 2019-03-25 ENCOUNTER — TELEPHONE (OUTPATIENT)
Dept: FAMILY MEDICINE CLINIC | Facility: CLINIC | Age: 64
End: 2019-03-25

## 2019-03-25 VITALS
DIASTOLIC BLOOD PRESSURE: 70 MMHG | SYSTOLIC BLOOD PRESSURE: 120 MMHG | BODY MASS INDEX: 23.16 KG/M2 | HEIGHT: 72 IN | OXYGEN SATURATION: 93 % | WEIGHT: 171 LBS | HEART RATE: 84 BPM | RESPIRATION RATE: 14 BRPM | TEMPERATURE: 99 F

## 2019-03-25 DIAGNOSIS — E04.1 NONTOXIC UNINODULAR GOITER: ICD-10-CM

## 2019-03-25 DIAGNOSIS — R09.02 OXYGEN DESATURATION: ICD-10-CM

## 2019-03-25 DIAGNOSIS — J22 ACUTE LOWER RESPIRATORY INFECTION: ICD-10-CM

## 2019-03-25 DIAGNOSIS — J22 ACUTE LOWER RESPIRATORY INFECTION: Primary | ICD-10-CM

## 2019-03-25 LAB
FLUAV + FLUBV RNA SPEC NAA+PROBE: NEGATIVE
T4 FREE SERPL-MCNC: 1 NG/DL (ref 0.8–1.7)
TSI SER-ACNC: 1.28 MIU/ML (ref 0.36–3.74)

## 2019-03-25 PROCEDURE — 84439 ASSAY OF FREE THYROXINE: CPT

## 2019-03-25 PROCEDURE — 84443 ASSAY THYROID STIM HORMONE: CPT

## 2019-03-25 PROCEDURE — 71046 X-RAY EXAM CHEST 2 VIEWS: CPT | Performed by: FAMILY MEDICINE

## 2019-03-25 PROCEDURE — 99214 OFFICE O/P EST MOD 30 MIN: CPT | Performed by: FAMILY MEDICINE

## 2019-03-25 PROCEDURE — 87999 UNLISTED MICROBIOLOGY PX: CPT

## 2019-03-25 PROCEDURE — 94640 AIRWAY INHALATION TREATMENT: CPT | Performed by: FAMILY MEDICINE

## 2019-03-25 PROCEDURE — 87502 INFLUENZA DNA AMP PROBE: CPT

## 2019-03-25 PROCEDURE — 87798 DETECT AGENT NOS DNA AMP: CPT

## 2019-03-25 PROCEDURE — 36415 COLL VENOUS BLD VENIPUNCTURE: CPT

## 2019-03-25 RX ORDER — ALBUTEROL SULFATE 2.5 MG/3ML
2.5 SOLUTION RESPIRATORY (INHALATION) ONCE
Status: COMPLETED | OUTPATIENT
Start: 2019-03-25 | End: 2019-03-25

## 2019-03-25 RX ORDER — CEFDINIR 300 MG/1
300 CAPSULE ORAL 2 TIMES DAILY
Qty: 20 CAPSULE | Refills: 0 | Status: SHIPPED | OUTPATIENT
Start: 2019-03-25 | End: 2019-04-04 | Stop reason: ALTCHOICE

## 2019-03-25 RX ADMIN — ALBUTEROL SULFATE 2.5 MG: 2.5 SOLUTION RESPIRATORY (INHALATION) at 11:41:00

## 2019-03-25 NOTE — PROGRESS NOTES
HPI:   Shannon Polanco is a 58year old male who presents for upper respiratory symptoms for  3  days. Patient reports congestion, cough with yellow colored sputum, cough is keeping pt up at night, low grade fever/chills/rigors.       Current Outpatient Med ([facility]     no O2   • Coronary atherosclerosis    • Deep vein thrombosis (HCC)     left arm   • Difficult intubation    • Dilated cardiomyopathy (HCC)    • Disorder of thyroid    • Diverticulosis of large intestine    • Esophageal reflux    • Exposure to rad ESOPHAGOGASTRODUODENOSCOPY, POSSIBLE BIOPSY, POSSIBLE POLYPECTOMY 14568 N/A 8/24/2016    Performed by Kaushik Lopez MD at [facility]   • GASTRIC BYPASS,OBESITY,[address]  2017   • GREATER TROCHANTERIC BURSA INJECTION BILATERAL Bilateral History    Tobacco Use      Smoking status: Never Smoker      Smokeless tobacco: Never Used    Alcohol use: Yes      Comment: occasionally    Drug use: No        REVIEW OF SYSTEMS:   GENERAL: feels ill with body aches and chills  SKIN: no rashes  EYES:tamiko

## 2019-03-25 NOTE — TELEPHONE ENCOUNTER
Tyrell for pt to Cb. I did leave a message for patient if his question was of an urgent nature to call the on call physician at [phone] otherwise patient can call us back in the morning after 8:30.

## 2019-03-26 NOTE — TELEPHONE ENCOUNTER
Pt called back and c/o left ear pain and he wants to know if the Cefdinir will also take care of an ear infection? Pt c/o decrease hearing, pain, which he rates about 7 and it's constant. Pt denies any fever or drainage. Please advise.

## 2019-03-26 NOTE — TELEPHONE ENCOUNTER
Call to pt's cell reaches identified voice mail. Per hipaa consent, left vmm advising of dr davis's comments re cefdinir. Reinforced if no improvement or any new/worsening symptoms call back and speak w triage nurse.

## 2019-04-01 ENCOUNTER — OFFICE VISIT (OUTPATIENT)
Dept: FAMILY MEDICINE CLINIC | Facility: CLINIC | Age: 64
End: 2019-04-01
Payer: MEDICARE

## 2019-04-01 ENCOUNTER — TELEPHONE (OUTPATIENT)
Dept: FAMILY MEDICINE CLINIC | Facility: CLINIC | Age: 64
End: 2019-04-01

## 2019-04-01 VITALS
WEIGHT: 168 LBS | BODY MASS INDEX: 22.75 KG/M2 | RESPIRATION RATE: 16 BRPM | TEMPERATURE: 98 F | DIASTOLIC BLOOD PRESSURE: 74 MMHG | HEIGHT: 72 IN | HEART RATE: 93 BPM | SYSTOLIC BLOOD PRESSURE: 116 MMHG

## 2019-04-01 DIAGNOSIS — S09.90XA CLOSED HEAD INJURY, INITIAL ENCOUNTER: Primary | ICD-10-CM

## 2019-04-01 DIAGNOSIS — S01.312A LACERATION OF AURICLE OF LEFT EAR, INITIAL ENCOUNTER: ICD-10-CM

## 2019-04-01 PROCEDURE — 99213 OFFICE O/P EST LOW 20 MIN: CPT | Performed by: FAMILY MEDICINE

## 2019-04-01 NOTE — TELEPHONE ENCOUNTER
Pt had no LOC, no headache, no nausea and no vision changes. He will see Dr. Qian Rodriguez today at 11:30. Pt agreed to plan.

## 2019-04-01 NOTE — PROGRESS NOTES
[facility] Family Medicine Office Note  Chief Complaint:   Patient presents with:  Fall (musculoskeletal, neurologic): pt sts did not LOC  Head Injury: left ear gash       HPI:   This is a 58year old male coming in for a head injury which occurr Neuropathy     feet   • Nocturia 9/8/2015   • Nose disorder    • Osteoarthritis    • Osteoporosis    • Other and unspecified hyperlipidemia    • Pacemaker    • Paralyzed vocal cords     right   • Problems with swallowing    • [facility] 10/20/20 CERVICAL 3-4, CERVICAL 4-5, CERVICAL 6-7.  MICRODISCECTOMY, INTERBODY FUSION WITH PLATE AND ALLOGRAFT SCREWS N/A    • OTHER SURGICAL HISTORY Right 07/2007    FOOT SX   • SACROILIAC JOINT INJECTION BILATERAL Bilateral 11/17/2015    Performed by Alexis Douglas MCG/INH Inhalation Aerosol Powder, Breath Activated Inhale 2 puffs into the lungs 2 (two) times daily. Disp:  Rfl:    Mexiletine HCl 150 MG Oral Cap Take 150 mg by mouth 2 (two) times daily.  Disp:  Rfl:    NASCOBAL 500 MCG/0.1ML Nasal Solution by Nasal r 72\"   Wt 168 lb   BMI 22.78 kg/m²  Estimated body mass index is 22.78 kg/m² as calculated from the following:    Height as of this encounter: 72\". Weight as of this encounter: 168 lb. Vital signs reviewed. Appears stated age, well groomed.   Physical learning. Medical education done. Outcome: Patient verbalizes understanding. Patient is notified to call with any questions, complications, allergies, or worsening or changing symptoms.   Patient is to call with any side effects or complications from the

## 2019-04-02 ENCOUNTER — HOSPITAL ENCOUNTER (EMERGENCY)
Facility: HOSPITAL | Age: 64
Discharge: HOME | End: 2019-04-02
Attending: EMERGENCY MEDICINE
Payer: MEDICARE

## 2019-04-02 ENCOUNTER — APPOINTMENT (OUTPATIENT)
Dept: CT IMAGING | Facility: HOSPITAL | Age: 64
End: 2019-04-02
Attending: EMERGENCY MEDICINE
Payer: MEDICARE

## 2019-04-02 VITALS
SYSTOLIC BLOOD PRESSURE: 115 MMHG | BODY MASS INDEX: 22.21 KG/M2 | OXYGEN SATURATION: 97 % | DIASTOLIC BLOOD PRESSURE: 76 MMHG | HEART RATE: 72 BPM | RESPIRATION RATE: 17 BRPM | WEIGHT: 164 LBS | HEIGHT: 72 IN | TEMPERATURE: 98 F

## 2019-04-02 DIAGNOSIS — S09.90XA INJURY OF HEAD, INITIAL ENCOUNTER: Primary | ICD-10-CM

## 2019-04-02 PROCEDURE — 70450 CT HEAD/BRAIN W/O DYE: CPT | Performed by: EMERGENCY MEDICINE

## 2019-04-02 PROCEDURE — 99284 EMERGENCY DEPT VISIT MOD MDM: CPT

## 2019-04-02 PROCEDURE — 93005 ELECTROCARDIOGRAM TRACING: CPT

## 2019-04-02 PROCEDURE — 93010 ELECTROCARDIOGRAM REPORT: CPT

## 2019-04-02 RX ORDER — MECLIZINE HYDROCHLORIDE 25 MG/1
25 TABLET ORAL 3 TIMES DAILY PRN
Qty: 20 TABLET | Refills: 0 | Status: SHIPPED | OUTPATIENT
Start: 2019-04-02

## 2019-04-02 RX ORDER — CETIRIZINE HYDROCHLORIDE 5 MG/1
5 TABLET ORAL DAILY
COMMUNITY
End: 2019-04-09 | Stop reason: ALTCHOICE

## 2019-04-02 NOTE — ED PROVIDER NOTES
Patient Seen in: [facility] Emergency Department    History   Patient presents with:  Fall (musculoskeletal, neurologic)    Stated Complaint: Fall on Sunday, saw MD yesterday, feeling dizzy, denies loc    HPI  This is a 25-year-old male who who pres urination    • Gallbladder problem    • H/O gastric bypass 08/14/2017   • Heart palpitations    • High blood pressure    • High cholesterol    • History of blood clots     PE 2014, post-op, Left arm blood clot 2009   • Hypertrophy of prostate with urinary Camilo Valenzuela MD at [facility] MAIN OR   • HIP JOINT INJECTION Left 12/17/2015    Performed by Joanna Quijano MD at [facility] MAIN OR   • HIP JOINT INJECTION Right 12/10/2015    Performed by Joanna Quijano MD at [address] [place] [address] that is healing up at this present time. There is no midline neck tenderness she has normal range of motion of his neck. The patient is in no respiratory distress. The patient is not septic or toxic    HEENT: Atraumatic, conjunctiva are not pale.   There intermittent fever and left ear redness and pain since last Thursday. FINDINGS:  Overall stable appearance of the chest.  Support devices appear unchanged. No focal consolidation is seen. Heart size is within normal limits.   No pleural effusion is see not feel like his heart racing he feels comfortable the patient states he thinks is all from the head injury he states that he does have a mild headache and it is only with certain movements or positions discussed and could be vertigo associated with a hea

## 2019-04-02 NOTE — ED INITIAL ASSESSMENT (HPI)
Reports he fell on Sunday while leaning back in a chair and hit the L side of his head on the wall. No LOC. Saw his PMD yesterday but at that time did not have a h/a or dizziness. Today he reports he woke up and felt dizzy and like he is in a \"fog\".

## 2019-04-03 ENCOUNTER — TELEPHONE (OUTPATIENT)
Dept: FAMILY MEDICINE CLINIC | Facility: CLINIC | Age: 64
End: 2019-04-03

## 2019-04-03 NOTE — TELEPHONE ENCOUNTER
Patient states he has one more day left of this antibiotic. He states he is not feeling better and he wants another prescription of it. Patient is aware that Dr. Aubrie Ballard in not in office today. Thank you.

## 2019-04-04 ENCOUNTER — OFFICE VISIT (OUTPATIENT)
Dept: FAMILY MEDICINE CLINIC | Facility: CLINIC | Age: 64
End: 2019-04-04
Payer: MEDICARE

## 2019-04-04 VITALS
DIASTOLIC BLOOD PRESSURE: 70 MMHG | HEART RATE: 90 BPM | BODY MASS INDEX: 22.89 KG/M2 | RESPIRATION RATE: 14 BRPM | HEIGHT: 72 IN | WEIGHT: 169 LBS | SYSTOLIC BLOOD PRESSURE: 120 MMHG | OXYGEN SATURATION: 97 % | TEMPERATURE: 97 F

## 2019-04-04 DIAGNOSIS — J20.9 ACUTE BRONCHITIS WITH BRONCHOSPASM: Primary | ICD-10-CM

## 2019-04-04 DIAGNOSIS — H65.02 NON-RECURRENT ACUTE SEROUS OTITIS MEDIA OF LEFT EAR: ICD-10-CM

## 2019-04-04 PROCEDURE — 99214 OFFICE O/P EST MOD 30 MIN: CPT | Performed by: FAMILY MEDICINE

## 2019-04-04 RX ORDER — PREDNISONE 20 MG/1
60 TABLET ORAL DAILY
Qty: 15 TABLET | Refills: 0 | Status: SHIPPED | OUTPATIENT
Start: 2019-04-04 | End: 2019-04-09 | Stop reason: ALTCHOICE

## 2019-04-04 RX ORDER — AMOXICILLIN AND CLAVULANATE POTASSIUM 875; 125 MG/1; MG/1
1 TABLET, FILM COATED ORAL 2 TIMES DAILY
Qty: 20 TABLET | Refills: 0 | Status: SHIPPED | OUTPATIENT
Start: 2019-04-04 | End: 2019-04-14

## 2019-04-04 NOTE — PROGRESS NOTES
[facility] Family Medicine Office Note  Chief Complaint:   Patient presents with:  ER F/U: 4/2/19 head injury from fall      HPI:   This is a 58year old male coming in for ER follow-up from a recent head injury.   He has CT of the head that was u cancer ([facility].) 10/20/2016   • Pul[facility] 2014    post-op bicep tendon repair   • Unspecified essential hypertension    • Unspecified sleep apnea     cpap, no longer needs after weight loss   • V-tach [facility])    • Visual impairment     glasses   • by Aline Trevizo MD at [facility] MAIN OR   • SHOULDER ARTHROSCOPY Right 2014    AT [address] [facility] [place],    • SPINE SURGERY PROCEDURE UNLISTED  03/2011    CERVICAL FUSION/HARDWARE   • STELLATE GANGLION BLOCK Left 1/7/2016    Performed by Mimi Rousseau mg total) by mouth daily with food. Disp: 30 tablet Rfl: 0   Acetaminophen (ACETAMINOPHEN EXTRA STRENGTH) 500 MG Oral Cap Take 1,000 mg by mouth one time.  Disp:  Rfl:    ASMANEX 120 METERED DOSES 220 MCG/INH Inhalation Aerosol Powder, Breath Activated [facility] joint pain or stiffness.     EXAM:   /70 (BP Location: Left arm)   Pulse 90   Temp 97 °F (36.1 °C) (Oral)   Resp 14   Ht 72\"   Wt 169 lb   SpO2 97%   BMI 22.92 kg/m²  Estimated body mass index is 22.92 kg/m² as calculated from the following:    Olivia 0      Meds & Refills for this Visit:  Requested Prescriptions     Signed Prescriptions Disp Refills   • predniSONE 20 MG Oral Tab 15 tablet 0     Sig: Take 3 tablets (60 mg total) by mouth daily for 5 days.    • Amoxicillin-Pot Clavulanate 875-125 MG Oral Diverticulosis of large intestine without hemorrhage     Dilated cardiomyopathy (HCC)     H/O gastric bypass     Tension type headache     Overweight (BMI 25.0-29. 9)     Memory loss     Orthostatic lightheadedness     History of pulmonary embolism     Cerv

## 2019-04-05 RX ORDER — MONTELUKAST SODIUM 10 MG/1
TABLET ORAL
COMMUNITY

## 2019-04-05 RX ORDER — MEXILETINE HYDROCHLORIDE 150 MG/1
CAPSULE ORAL
COMMUNITY
End: 2019-12-12 | Stop reason: SDUPTHER

## 2019-04-05 RX ORDER — IPRATROPIUM BROMIDE AND ALBUTEROL SULFATE 2.5; .5 MG/3ML; MG/3ML
SOLUTION RESPIRATORY (INHALATION)
COMMUNITY
End: 2019-07-08 | Stop reason: CLARIF

## 2019-04-05 RX ORDER — NEBIVOLOL 2.5 MG/1
TABLET ORAL
COMMUNITY
End: 2019-07-08 | Stop reason: SDUPTHER

## 2019-04-05 RX ORDER — TIOTROPIUM BROMIDE 18 UG/1
CAPSULE ORAL; RESPIRATORY (INHALATION)
COMMUNITY

## 2019-04-09 ENCOUNTER — OFFICE VISIT (OUTPATIENT)
Dept: SURGERY | Facility: CLINIC | Age: 64
End: 2019-04-09
Payer: MEDICARE

## 2019-04-09 VITALS — DIASTOLIC BLOOD PRESSURE: 70 MMHG | SYSTOLIC BLOOD PRESSURE: 126 MMHG | HEART RATE: 88 BPM

## 2019-04-09 DIAGNOSIS — G62.9 POLYNEUROPATHY: ICD-10-CM

## 2019-04-09 DIAGNOSIS — M21.371 FOOT DROP, BILATERAL: Primary | ICD-10-CM

## 2019-04-09 DIAGNOSIS — Z98.1 S/P CERVICAL SPINAL FUSION: ICD-10-CM

## 2019-04-09 DIAGNOSIS — M21.372 FOOT DROP, BILATERAL: Primary | ICD-10-CM

## 2019-04-09 PROCEDURE — 99213 OFFICE O/P EST LOW 20 MIN: CPT | Performed by: PHYSICIAN ASSISTANT

## 2019-04-09 NOTE — PROGRESS NOTES
[facility] Neurosurgery eval      HISTORY OF PRESENT ILLNESS:Favio Bo is a 58year old RH male in follow-up since his last visit he is doing very well. Like his strength is improving. He does not have any neck pain.   His overall foot strength has slowl Patient has postop difficulty with swallowing and speech she was diagnosed with left vocal cord paresis. He does have a nasogastric feeding tube. He was in the ER last night for blockage.   He had a swallow study performed yesterday which is unchanged fro He complains of low back pain which is a 3-4/10. His back pain gets worse with movement. He does get back tightness at night that does wake him up. He denies any radiculopathy in the legs.   He does complain of stiffness in the legs and achiness in the a • Pulmonary embolism ([facility] 2014    post-op bicep tendon repair   • Unspecified essential hypertension    • Unspecified sleep apnea     cpap, no longer needs after weight loss   • V-tach [facility])    • Visual impairment     glasses   • Vitamin B6 deficiency Performed by Sophia Cameron MD at [facility] MAIN OR   • SHOULDER ARTHROSCOPY Right 2014    AT [address] [facility] [place],    • SPINE SURGERY PROCEDURE UNLISTED  03/2011    CERVICAL FUSION/HARDWARE   • STELLATE GANGLION BLOCK Left 1/7/2016    Performed Reflexes DTRs: 1/4 in upper and lower extremities    Today's exam  His right posterior ankle incision is healing well. Sutures removed.   Steri-Strips placed    IMAGING:  Reviewing the CT of the cervical spine postoperatively from October 25, 2018 shows po 8.  Left vocal cord paresis  9. Upper and lower extremity weakness improving  10 that is post a right sural nerve biopsy 11/27/18    PLAN:  1. Follow-up with Dr Tim Salinas   2. Follow-up 3 months or as needed      SHELDON Pimentel M.S., ELIESER Deleon

## 2019-04-09 NOTE — PROGRESS NOTES
Pt is here for follow up physical therapy. Pt states that he is finished with PT.        Follow-up with Dr Nini Lamb:  Post EMG       Pt states that he has been doing much better,

## 2019-04-17 ENCOUNTER — ANCILLARY PROCEDURE (OUTPATIENT)
Dept: CARDIOLOGY | Age: 64
End: 2019-04-17
Attending: INTERNAL MEDICINE

## 2019-04-17 VITALS
SYSTOLIC BLOOD PRESSURE: 118 MMHG | DIASTOLIC BLOOD PRESSURE: 60 MMHG | WEIGHT: 160 LBS | BODY MASS INDEX: 21.7 KG/M2 | HEART RATE: 80 BPM

## 2019-04-17 DIAGNOSIS — Z45.018 PACEMAKER REPROGRAMMING/CHECK: ICD-10-CM

## 2019-04-17 PROCEDURE — 93283 PRGRMG EVAL IMPLANTABLE DFB: CPT | Performed by: INTERNAL MEDICINE

## 2019-05-02 ENCOUNTER — APPOINTMENT (OUTPATIENT)
Dept: CARDIOLOGY | Age: 64
End: 2019-05-02
Attending: INTERNAL MEDICINE

## 2019-05-06 PROCEDURE — 93296 REM INTERROG EVL PM/IDS: CPT | Performed by: INTERNAL MEDICINE

## 2019-05-09 ENCOUNTER — E-ADVICE (OUTPATIENT)
Dept: CARDIOLOGY | Age: 64
End: 2019-05-09

## 2019-05-24 ENCOUNTER — HOSPITAL ENCOUNTER (OUTPATIENT)
Dept: CT IMAGING | Facility: HOSPITAL | Age: 64
Discharge: HOME | End: 2019-05-24
Attending: PHYSICIAN ASSISTANT
Payer: MEDICARE

## 2019-05-24 DIAGNOSIS — H93.A9 PULSATILE TINNITUS: ICD-10-CM

## 2019-05-24 PROCEDURE — 70498 CT ANGIOGRAPHY NECK: CPT | Performed by: PHYSICIAN ASSISTANT

## 2019-05-24 PROCEDURE — 82565 ASSAY OF CREATININE: CPT

## 2019-05-24 PROCEDURE — 70496 CT ANGIOGRAPHY HEAD: CPT | Performed by: PHYSICIAN ASSISTANT

## 2019-05-28 NOTE — PROGRESS NOTES
Please inform CT showing no significant arterial abnormality seen, incidental note of right maxillary inflammation noted recommend follow up in office with Dr Ilsa Bertrand to re evaluate

## 2019-06-14 ENCOUNTER — TELEPHONE (OUTPATIENT)
Dept: SURGERY | Facility: CLINIC | Age: 64
End: 2019-06-14

## 2019-06-14 NOTE — TELEPHONE ENCOUNTER
Appointment canceled for Alina Nair ([id number])   Visit Type: FOLLOW UP   Date        Time      Length    Provider                  Department   7/11/2019    2:30 PM  30 mins. Mani Guerrero MD      [facility] [place] [facility]      Reason for [facility]

## 2019-06-18 RX ORDER — RIVAROXABAN 20 MG/1
TABLET, FILM COATED ORAL
Qty: 30 TABLET | Refills: 0 | Status: SHIPPED | OUTPATIENT
Start: 2019-06-18 | End: 2019-07-08 | Stop reason: SDUPTHER

## 2019-07-03 ENCOUNTER — TELEPHONE (OUTPATIENT)
Dept: CARDIOLOGY | Age: 64
End: 2019-07-03

## 2019-07-08 ENCOUNTER — OFFICE VISIT (OUTPATIENT)
Dept: CARDIOLOGY | Age: 64
End: 2019-07-08

## 2019-07-08 ENCOUNTER — TELEPHONE (OUTPATIENT)
Dept: CARDIOLOGY | Age: 64
End: 2019-07-08

## 2019-07-08 VITALS
BODY MASS INDEX: 23.84 KG/M2 | HEIGHT: 72 IN | DIASTOLIC BLOOD PRESSURE: 52 MMHG | HEART RATE: 75 BPM | SYSTOLIC BLOOD PRESSURE: 92 MMHG | WEIGHT: 176 LBS

## 2019-07-08 DIAGNOSIS — I65.23 ASYMPTOMATIC CAROTID ARTERY STENOSIS, BILATERAL: ICD-10-CM

## 2019-07-08 DIAGNOSIS — E78.00 PURE HYPERCHOLESTEROLEMIA: ICD-10-CM

## 2019-07-08 DIAGNOSIS — R60.1 GENERALIZED EDEMA: ICD-10-CM

## 2019-07-08 DIAGNOSIS — I10 HYPERTENSION, BENIGN: ICD-10-CM

## 2019-07-08 DIAGNOSIS — I49.3 FREQUENT PVCS: ICD-10-CM

## 2019-07-08 DIAGNOSIS — I25.10 CORONARY ARTERY DISEASE INVOLVING NATIVE CORONARY ARTERY OF NATIVE HEART WITHOUT ANGINA PECTORIS: Primary | ICD-10-CM

## 2019-07-08 PROBLEM — Z95.810 ICD (IMPLANTABLE CARDIOVERTER-DEFIBRILLATOR) IN PLACE: Status: ACTIVE | Noted: 2018-06-04

## 2019-07-08 PROBLEM — I82.409 DVT (DEEP VENOUS THROMBOSIS) (CMD): Status: ACTIVE | Noted: 2018-06-04

## 2019-07-08 PROCEDURE — 99214 OFFICE O/P EST MOD 30 MIN: CPT | Performed by: INTERNAL MEDICINE

## 2019-07-08 RX ORDER — SILDENAFIL 100 MG/1
100 TABLET, FILM COATED ORAL
COMMUNITY
Start: 2019-02-19

## 2019-07-08 RX ORDER — MECLIZINE HYDROCHLORIDE 25 MG/1
25 TABLET ORAL
COMMUNITY
Start: 2019-04-02

## 2019-07-08 RX ORDER — BETAMETHASONE DIPROPIONATE 0.5 MG/G
CREAM TOPICAL
COMMUNITY
Start: 2017-09-13

## 2019-07-08 RX ORDER — NEBIVOLOL 2.5 MG/1
2.5 TABLET ORAL DAILY
Qty: 90 TABLET | Refills: 3 | Status: SHIPPED | OUTPATIENT
Start: 2019-07-08 | End: 2019-07-08 | Stop reason: CLARIF

## 2019-07-08 RX ORDER — METOPROLOL SUCCINATE 25 MG/1
25 TABLET, EXTENDED RELEASE ORAL DAILY
Qty: 90 TABLET | Refills: 3 | Status: SHIPPED | OUTPATIENT
Start: 2019-07-08

## 2019-07-08 RX ORDER — AZELASTINE 1 MG/ML
2 SPRAY, METERED NASAL
COMMUNITY
Start: 2019-04-19

## 2019-07-19 ENCOUNTER — HOSPITAL ENCOUNTER (OUTPATIENT)
Dept: CT IMAGING | Facility: HOSPITAL | Age: 64
Discharge: HOME | End: 2019-07-19
Attending: INTERNAL MEDICINE
Payer: MEDICARE

## 2019-07-19 DIAGNOSIS — R91.1 PULMONARY NODULE: ICD-10-CM

## 2019-07-19 PROCEDURE — 71250 CT THORAX DX C-: CPT | Performed by: INTERNAL MEDICINE

## 2019-07-23 ENCOUNTER — HOSPITAL ENCOUNTER (OUTPATIENT)
Dept: CV DIAGNOSTICS | Facility: HOSPITAL | Age: 64
Discharge: HOME | End: 2019-07-23
Attending: INTERNAL MEDICINE
Payer: MEDICARE

## 2019-07-23 DIAGNOSIS — I10 BENIGN HYPERTENSION: ICD-10-CM

## 2019-07-23 DIAGNOSIS — I25.10 CORONARY ARTERY DISEASE INVOLVING NATIVE HEART WITHOUT ANGINA PECTORIS, UNSPECIFIED VESSEL OR LESION TYPE: ICD-10-CM

## 2019-07-23 PROCEDURE — 93306 TTE W/DOPPLER COMPLETE: CPT | Performed by: INTERNAL MEDICINE

## 2019-07-25 ENCOUNTER — TELEPHONE (OUTPATIENT)
Dept: CARDIOLOGY | Age: 64
End: 2019-07-25

## 2019-08-01 ENCOUNTER — TELEPHONE (OUTPATIENT)
Dept: CARDIOLOGY | Age: 64
End: 2019-08-01

## 2019-08-12 PROCEDURE — 93296 REM INTERROG EVL PM/IDS: CPT | Performed by: INTERNAL MEDICINE

## 2019-08-12 PROCEDURE — 93295 DEV INTERROG REMOTE 1/2/MLT: CPT | Performed by: INTERNAL MEDICINE

## 2019-09-04 ENCOUNTER — ANCILLARY PROCEDURE (OUTPATIENT)
Dept: CARDIOLOGY | Age: 64
End: 2019-09-04
Attending: INTERNAL MEDICINE

## 2019-09-04 ENCOUNTER — ANCILLARY ORDERS (OUTPATIENT)
Dept: CARDIOLOGY | Age: 64
End: 2019-09-04

## 2019-09-04 DIAGNOSIS — Z95.810 ICD (IMPLANTABLE CARDIOVERTER-DEFIBRILLATOR) IN PLACE: ICD-10-CM

## 2019-09-20 ENCOUNTER — TELEPHONE (OUTPATIENT)
Dept: CARDIOLOGY | Age: 64
End: 2019-09-20

## 2019-09-25 ENCOUNTER — TELEPHONE (OUTPATIENT)
Dept: CARDIOLOGY | Age: 64
End: 2019-09-25

## 2019-10-02 ENCOUNTER — APPOINTMENT (OUTPATIENT)
Dept: CARDIOLOGY | Age: 64
End: 2019-10-02

## 2019-12-11 ENCOUNTER — ANCILLARY PROCEDURE (OUTPATIENT)
Dept: CARDIOLOGY | Age: 64
End: 2019-12-11
Attending: INTERNAL MEDICINE

## 2019-12-11 ENCOUNTER — ANCILLARY ORDERS (OUTPATIENT)
Dept: CARDIOLOGY | Age: 64
End: 2019-12-11

## 2019-12-11 DIAGNOSIS — Z95.810 ICD (IMPLANTABLE CARDIOVERTER-DEFIBRILLATOR) IN PLACE: ICD-10-CM

## 2019-12-11 PROCEDURE — X1114 CARDIAC DEVICE HOME CHECK - REMOTE UNSCHEDULED: HCPCS | Performed by: INTERNAL MEDICINE

## 2019-12-11 PROCEDURE — 93295 DEV INTERROG REMOTE 1/2/MLT: CPT | Performed by: INTERNAL MEDICINE

## 2019-12-12 RX ORDER — MEXILETINE HYDROCHLORIDE 150 MG/1
CAPSULE ORAL
Qty: 180 CAPSULE | Refills: 0 | Status: SHIPPED | OUTPATIENT
Start: 2019-12-12 | End: 2020-03-25 | Stop reason: SDUPTHER

## 2020-01-20 ENCOUNTER — TELEPHONE (OUTPATIENT)
Dept: FAMILY MEDICINE CLINIC | Facility: CLINIC | Age: 65
End: 2020-01-20

## 2020-02-05 ENCOUNTER — APPOINTMENT (RX ONLY)
Dept: URBAN - METROPOLITAN AREA CLINIC 158 | Facility: CLINIC | Age: 65
Setting detail: DERMATOLOGY
End: 2020-02-05

## 2020-02-05 DIAGNOSIS — L82.0 INFLAMED SEBORRHEIC KERATOSIS: ICD-10-CM

## 2020-02-05 DIAGNOSIS — D49.2 NEOPLASM OF UNSPECIFIED BEHAVIOR OF BONE, SOFT TISSUE, AND SKIN: ICD-10-CM

## 2020-02-05 DIAGNOSIS — L20.89 OTHER ATOPIC DERMATITIS: ICD-10-CM

## 2020-02-05 DIAGNOSIS — L82.1 OTHER SEBORRHEIC KERATOSIS: ICD-10-CM

## 2020-02-05 DIAGNOSIS — D22 MELANOCYTIC NEVI: ICD-10-CM

## 2020-02-05 PROBLEM — D48.5 NEOPLASM OF UNCERTAIN BEHAVIOR OF SKIN: Status: ACTIVE | Noted: 2020-02-05

## 2020-02-05 PROBLEM — D23.72 OTHER BENIGN NEOPLASM OF SKIN OF LEFT LOWER LIMB, INCLUDING HIP: Status: ACTIVE | Noted: 2020-02-05

## 2020-02-05 PROBLEM — D22.0 MELANOCYTIC NEVI OF LIP: Status: ACTIVE | Noted: 2020-02-05

## 2020-02-05 PROBLEM — L20.84 INTRINSIC (ALLERGIC) ECZEMA: Status: ACTIVE | Noted: 2020-02-05

## 2020-02-05 PROCEDURE — ? LIQUID NITROGEN

## 2020-02-05 PROCEDURE — 17110 DESTRUCTION B9 LES UP TO 14: CPT

## 2020-02-05 PROCEDURE — 99203 OFFICE O/P NEW LOW 30 MIN: CPT | Mod: 25

## 2020-02-05 PROCEDURE — ? PRESCRIPTION

## 2020-02-05 PROCEDURE — ? COUNSELING

## 2020-02-05 PROCEDURE — ? PATIENT SPECIFIC COUNSELING

## 2020-02-05 RX ORDER — BETAMETHASONE DIPROPIONATE 0.5 MG/G
1 CREAM TOPICAL TWICE A DAY
Qty: 1 | Refills: 3 | Status: ERX

## 2020-02-05 ASSESSMENT — LOCATION DETAILED DESCRIPTION DERM
LOCATION DETAILED: LEFT NASOLABIAL FOLD
LOCATION DETAILED: LEFT CENTRAL TEMPLE
LOCATION DETAILED: RIGHT MID-UPPER BACK
LOCATION DETAILED: LEFT SUPERIOR LATERAL NECK
LOCATION DETAILED: LEFT MID-UPPER BACK
LOCATION DETAILED: LEFT SUPERIOR UPPER BACK
LOCATION DETAILED: LEFT DORSAL FOOT
LOCATION DETAILED: RIGHT POSTERIOR LATERAL MALLEOLUS

## 2020-02-05 ASSESSMENT — LOCATION SIMPLE DESCRIPTION DERM
LOCATION SIMPLE: LEFT TEMPLE
LOCATION SIMPLE: NECK
LOCATION SIMPLE: LEFT LIP
LOCATION SIMPLE: RIGHT ANKLE
LOCATION SIMPLE: LEFT UPPER BACK
LOCATION SIMPLE: RIGHT UPPER BACK
LOCATION SIMPLE: LEFT FOOT

## 2020-02-05 ASSESSMENT — LOCATION ZONE DERM
LOCATION ZONE: FACE
LOCATION ZONE: TRUNK
LOCATION ZONE: NECK
LOCATION ZONE: FEET
LOCATION ZONE: LEG
LOCATION ZONE: LIP

## 2020-02-05 NOTE — PROCEDURE: LIQUID NITROGEN
Post-Care Instructions: I reviewed with the patient in detail post-care instructions. Patient is to wear sunprotection, and avoid picking at any of the treated lesions. Pt may apply Vaseline to crusted or scabbing areas.
Medical Necessity Clause: Traumatized
Render Post-Care Instructions In Note?: no
Medical Necessity Information: It is in your best interest to select a reason for this procedure from the list below. All of these items fulfill various CMS LCD requirements except the new and changing color options.
Detail Level: Detailed
Consent: The patient's consent was obtained including but not limited to risks of crusting, scabbing, blistering, scarring, darker or lighter pigmentary change, recurrence, incomplete removal and infection.

## 2020-02-05 NOTE — PROCEDURE: PATIENT SPECIFIC COUNSELING
Patient reports that he got stuck by an agave plant at the left temple about 6 weeks ago and subsequently developed a bump.  Exam revealed a subtle subcutaneous compressible nodule about 7-8mm in size.  Differential diagnosis include hematoma vs lipoma.  I reassure patient that there is no sign of infection as it was his main concern.  He can use betamethasone diproprionate cream to this site.  Will monitor for resolution and return to clinic if lesion enlarges.
Detail Level: Detailed

## 2020-02-20 ENCOUNTER — ANCILLARY ORDERS (OUTPATIENT)
Dept: CARDIOLOGY | Age: 65
End: 2020-02-20

## 2020-02-20 ENCOUNTER — ANCILLARY PROCEDURE (OUTPATIENT)
Dept: CARDIOLOGY | Age: 65
End: 2020-02-20
Attending: INTERNAL MEDICINE

## 2020-02-20 DIAGNOSIS — Z45.02 ENCOUNTER FOR ASSESSMENT OF IMPLANTABLE CARDIOVERTER-DEFIBRILLATOR (ICD): ICD-10-CM

## 2020-02-20 PROCEDURE — X1114 CARDIAC DEVICE HOME CHECK - REMOTE UNSCHEDULED: HCPCS | Performed by: INTERNAL MEDICINE

## 2020-02-20 PROCEDURE — 93295 DEV INTERROG REMOTE 1/2/MLT: CPT | Performed by: INTERNAL MEDICINE

## 2020-02-25 ENCOUNTER — TELEPHONE (OUTPATIENT)
Dept: FAMILY MEDICINE CLINIC | Facility: CLINIC | Age: 65
End: 2020-02-25

## 2020-02-25 NOTE — TELEPHONE ENCOUNTER
Left voice message for patient to call back to schedule a Medicare Annual Wellness visit.     Last Medicare Annual date:   1/29/19

## 2020-03-09 ENCOUNTER — NEW PATIENT (OUTPATIENT)
Dept: URBAN - METROPOLITAN AREA CLINIC 51 | Facility: CLINIC | Age: 65
End: 2020-03-09
Payer: MEDICARE

## 2020-03-09 PROCEDURE — 92134 CPTRZ OPH DX IMG PST SGM RTA: CPT | Performed by: OPTOMETRIST

## 2020-03-09 PROCEDURE — 92004 COMPRE OPH EXAM NEW PT 1/>: CPT | Performed by: OPTOMETRIST

## 2020-03-09 ASSESSMENT — INTRAOCULAR PRESSURE
OD: 18
OS: 16

## 2020-03-09 ASSESSMENT — VISUAL ACUITY
OS: 20/20
OD: 20/20

## 2020-03-09 ASSESSMENT — KERATOMETRY
OS: 42.51
OD: 42.95

## 2020-03-24 RX ORDER — MEXILETINE HYDROCHLORIDE 150 MG/1
CAPSULE ORAL
Qty: 180 CAPSULE | Refills: 0 | OUTPATIENT
Start: 2020-03-24

## 2020-03-25 RX ORDER — MEXILETINE HYDROCHLORIDE 150 MG/1
CAPSULE ORAL
Qty: 180 CAPSULE | Refills: 0 | Status: SHIPPED | OUTPATIENT
Start: 2020-03-25

## 2020-04-20 ENCOUNTER — APPOINTMENT (OUTPATIENT)
Dept: CARDIOLOGY | Age: 65
End: 2020-04-20
Attending: INTERNAL MEDICINE

## 2020-05-07 ENCOUNTER — TELEPHONE (OUTPATIENT)
Dept: FAMILY MEDICINE CLINIC | Facility: CLINIC | Age: 65
End: 2020-05-07

## 2020-09-09 ENCOUNTER — ANCILLARY ORDERS (OUTPATIENT)
Dept: CARDIOLOGY | Age: 65
End: 2020-09-09

## 2020-09-09 ENCOUNTER — TELEPHONE (OUTPATIENT)
Dept: CARDIOLOGY | Age: 65
End: 2020-09-09

## 2020-09-09 ENCOUNTER — ANCILLARY PROCEDURE (OUTPATIENT)
Dept: CARDIOLOGY | Age: 65
End: 2020-09-09
Attending: INTERNAL MEDICINE

## 2020-09-09 DIAGNOSIS — Z95.810 ICD (IMPLANTABLE CARDIOVERTER-DEFIBRILLATOR) IN PLACE: ICD-10-CM

## 2020-09-09 PROCEDURE — 93295 DEV INTERROG REMOTE 1/2/MLT: CPT | Performed by: INTERNAL MEDICINE

## 2020-09-09 PROCEDURE — X1114 CARDIAC DEVICE HOME CHECK - REMOTE UNSCHEDULED: HCPCS | Performed by: INTERNAL MEDICINE

## 2021-01-22 ENCOUNTER — FOLLOW UP ESTABLISHED (OUTPATIENT)
Dept: URBAN - METROPOLITAN AREA CLINIC 51 | Facility: CLINIC | Age: 66
End: 2021-01-22
Payer: MEDICARE

## 2021-01-22 DIAGNOSIS — H04.123 DRY EYE SYNDROME OF BILATERAL LACRIMAL GLANDS: ICD-10-CM

## 2021-01-22 DIAGNOSIS — H25.13 AGE-RELATED NUCLEAR CATARACT, BILATERAL: ICD-10-CM

## 2021-01-22 DIAGNOSIS — H35.3131 NONEXUDATIVE AGE-RELATED MACULAR DEGENERATION, BILATERAL, EARLY DRY STAGE: ICD-10-CM

## 2021-01-22 DIAGNOSIS — H43.813 VITREOUS DEGENERATION, BILATERAL: Primary | ICD-10-CM

## 2021-01-22 PROCEDURE — 92014 COMPRE OPH EXAM EST PT 1/>: CPT | Performed by: OPTOMETRIST

## 2021-01-22 PROCEDURE — 92134 CPTRZ OPH DX IMG PST SGM RTA: CPT | Performed by: OPTOMETRIST

## 2021-01-22 ASSESSMENT — VISUAL ACUITY
OS: 20/20
OD: 20/20

## 2021-01-22 ASSESSMENT — KERATOMETRY
OS: 42.53
OD: 42.96

## 2021-01-22 ASSESSMENT — INTRAOCULAR PRESSURE
OD: 13
OS: 11

## 2021-01-26 DIAGNOSIS — Z23 NEED FOR VACCINATION: ICD-10-CM

## 2021-02-04 ENCOUNTER — APPOINTMENT (RX ONLY)
Dept: URBAN - METROPOLITAN AREA CLINIC 158 | Facility: CLINIC | Age: 66
Setting detail: DERMATOLOGY
End: 2021-02-04

## 2021-02-04 DIAGNOSIS — D22 MELANOCYTIC NEVI: ICD-10-CM

## 2021-02-04 DIAGNOSIS — L85.3 XEROSIS CUTIS: ICD-10-CM

## 2021-02-04 DIAGNOSIS — L20.89 OTHER ATOPIC DERMATITIS: ICD-10-CM | Status: WELL CONTROLLED

## 2021-02-04 DIAGNOSIS — L82.1 OTHER SEBORRHEIC KERATOSIS: ICD-10-CM

## 2021-02-04 DIAGNOSIS — L82.0 INFLAMED SEBORRHEIC KERATOSIS: ICD-10-CM

## 2021-02-04 PROBLEM — D23.39 OTHER BENIGN NEOPLASM OF SKIN OF OTHER PARTS OF FACE: Status: ACTIVE | Noted: 2021-02-04

## 2021-02-04 PROBLEM — D22.0 MELANOCYTIC NEVI OF LIP: Status: ACTIVE | Noted: 2021-02-04

## 2021-02-04 PROBLEM — D23.72 OTHER BENIGN NEOPLASM OF SKIN OF LEFT LOWER LIMB, INCLUDING HIP: Status: ACTIVE | Noted: 2021-02-04

## 2021-02-04 PROBLEM — D22.5 MELANOCYTIC NEVI OF TRUNK: Status: ACTIVE | Noted: 2021-02-04

## 2021-02-04 PROBLEM — L20.84 INTRINSIC (ALLERGIC) ECZEMA: Status: ACTIVE | Noted: 2021-02-04

## 2021-02-04 PROCEDURE — ? PRESCRIPTION

## 2021-02-04 PROCEDURE — ? LIQUID NITROGEN

## 2021-02-04 PROCEDURE — ? COUNSELING

## 2021-02-04 PROCEDURE — 99213 OFFICE O/P EST LOW 20 MIN: CPT | Mod: 25

## 2021-02-04 PROCEDURE — 17110 DESTRUCTION B9 LES UP TO 14: CPT

## 2021-02-04 PROCEDURE — ? PATIENT SPECIFIC COUNSELING

## 2021-02-04 RX ORDER — BETAMETHASONE DIPROPIONATE 0.5 MG/G
1 CREAM TOPICAL TWICE A DAY
Qty: 1 | Refills: 3 | Status: ERX | COMMUNITY
Start: 2021-02-04

## 2021-02-04 RX ADMIN — BETAMETHASONE DIPROPIONATE 1: 0.5 CREAM TOPICAL at 00:00

## 2021-02-04 ASSESSMENT — LOCATION DETAILED DESCRIPTION DERM
LOCATION DETAILED: LEFT INFERIOR MEDIAL UPPER BACK
LOCATION DETAILED: LEFT NASOLABIAL FOLD
LOCATION DETAILED: RIGHT POSTERIOR LATERAL MALLEOLUS
LOCATION DETAILED: RIGHT PROXIMAL DORSAL FOREARM
LOCATION DETAILED: RIGHT LATERAL UPPER BACK
LOCATION DETAILED: LEFT PROXIMAL DORSAL FOREARM
LOCATION DETAILED: RIGHT SUPERIOR LATERAL UPPER BACK
LOCATION DETAILED: LEFT DORSAL FOOT
LOCATION DETAILED: RIGHT MID-UPPER BACK
LOCATION DETAILED: LEFT MID-UPPER BACK
LOCATION DETAILED: LEFT SUPERIOR LATERAL NECK

## 2021-02-04 ASSESSMENT — LOCATION SIMPLE DESCRIPTION DERM
LOCATION SIMPLE: LEFT FOREARM
LOCATION SIMPLE: RIGHT UPPER BACK
LOCATION SIMPLE: NECK
LOCATION SIMPLE: LEFT LIP
LOCATION SIMPLE: RIGHT FOREARM
LOCATION SIMPLE: RIGHT ANKLE
LOCATION SIMPLE: LEFT FOOT
LOCATION SIMPLE: LEFT UPPER BACK

## 2021-02-04 ASSESSMENT — LOCATION ZONE DERM
LOCATION ZONE: LEG
LOCATION ZONE: LIP
LOCATION ZONE: NECK
LOCATION ZONE: FEET
LOCATION ZONE: TRUNK
LOCATION ZONE: ARM

## 2021-02-04 NOTE — PROCEDURE: PATIENT SPECIFIC COUNSELING
Detail Level: Detailed
Patient was recommended Aveeno protect and hydrate.
Condition is well controlled with intermittent use of betamethasone diproprionate cream; refill given per patient's request.

## 2021-02-11 ENCOUNTER — RX CHECK (OUTPATIENT)
Dept: URBAN - METROPOLITAN AREA CLINIC 51 | Facility: CLINIC | Age: 66
End: 2021-02-11
Payer: MEDICARE

## 2021-02-11 DIAGNOSIS — H52.4 PRESBYOPIA: Primary | ICD-10-CM

## 2021-02-11 PROCEDURE — 92015 DETERMINE REFRACTIVE STATE: CPT | Performed by: OPTOMETRIST

## 2021-02-11 ASSESSMENT — KERATOMETRY
OS: 42.91
OD: 43.02

## 2021-02-11 ASSESSMENT — VISUAL ACUITY
OD: 20/20
OS: 20/20

## 2021-02-24 NOTE — TELEPHONE ENCOUNTER
[facility] needs call back from Nathan MAYORGA due to information Provided by patient's wife that may cause patient to be dissenroled from [facility] if therapy is not done through [facility].  Informed her we will have Nathan call her some time today most likely a Left message on answering machine for patient to call back.

## 2021-08-13 ENCOUNTER — TELEPHONE (OUTPATIENT)
Dept: FAMILY MEDICINE CLINIC | Facility: CLINIC | Age: 66
End: 2021-08-13

## 2021-10-07 ENCOUNTER — TELEPHONE (OUTPATIENT)
Dept: SURGERY | Facility: CLINIC | Age: 66
End: 2021-10-07

## 2021-11-09 ENCOUNTER — OFFICE VISIT (OUTPATIENT)
Dept: URBAN - METROPOLITAN AREA CLINIC 54 | Facility: CLINIC | Age: 66
End: 2021-11-09
Payer: MEDICARE

## 2021-11-09 DIAGNOSIS — H35.3133 NEXDTVE AGE-REL MCLR DEGN, BI, ADV ATRPC WITHOUT SBFVL INVL: Primary | ICD-10-CM

## 2021-11-09 PROCEDURE — 92134 CPTRZ OPH DX IMG PST SGM RTA: CPT | Performed by: OPHTHALMOLOGY

## 2021-11-09 PROCEDURE — 99204 OFFICE O/P NEW MOD 45 MIN: CPT | Performed by: OPHTHALMOLOGY

## 2021-11-09 ASSESSMENT — INTRAOCULAR PRESSURE
OD: 10
OS: 9

## 2021-11-09 NOTE — IMPRESSION/PLAN
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 Plan: Exam/OCT reveals RPE atrophy with no evidence of CNV OU. Discussed risk for developing NV AMD.  Advised patient to contact us immediately for any new metamorphopsia or decrease or vision.  

Return in 1 year, OCT OU

## 2021-11-09 NOTE — IMPRESSION/PLAN
Impression: Bilateral vitreous detachment of eyes: H43.813. Bilateral. Plan: PVD with no retinal break or retinal detachment OU. Reviewed signs and symptoms of a retinal tear and detachment. Advised to return immediately for new floaters, flashing lights or a shadow in the vision.

## 2021-11-09 NOTE — IMPRESSION/PLAN
Impression: Age-related nuclear cataract, bilateral: H25.13. Bilateral. Plan: Follow up with Dr. Tiffany Fry.

## 2022-06-16 NOTE — TELEPHONE ENCOUNTER
Per wife, pt developed foot drop about two weeks ago and it is getting worse. Pt saw his PCP, Dr. Hitesh Marrufo. Dr. Hitesh Marrufo insisted pt call today to get an appt asap. We were happy to see you today    For your Testing  2 weeks on a saturay   Please have your labs and/or imaging test done at your earliest convience      For your Medication   You can start the new medication   Take 1/2 tablet once daily for one week   Then if you don't have any major side effects you can  increase to one full tablet until our follow up    For more information about side effects please visit medlineplus.gov      For your Referrals      For your Vaccinations    We gave your the following vaccines    Please obtain the following vaccines at the pharmacy          Please return to clinic in      2  week      Extra resources

## 2022-07-28 ENCOUNTER — TELEPHONE (OUTPATIENT)
Dept: FAMILY MEDICINE CLINIC | Facility: CLINIC | Age: 67
End: 2022-07-28

## 2022-07-28 NOTE — TELEPHONE ENCOUNTER
enam to schedule maw. Outreach attempt dates as follows:    1st attempt: 2/25/2020  2nd attempt: 8/13/2021  3rd attempt: 7/28/2022    Pt has not been seen in office since 4/4/2019 and is no longer considered an established patient of Dr. Armin Lee. PCP removal form filled out and sent for processing. No further outreach is needed.

## 2022-09-19 ENCOUNTER — APPOINTMENT (RX ONLY)
Dept: URBAN - METROPOLITAN AREA CLINIC 158 | Facility: CLINIC | Age: 67
Setting detail: DERMATOLOGY
End: 2022-09-19

## 2022-09-19 DIAGNOSIS — D22 MELANOCYTIC NEVI: ICD-10-CM

## 2022-09-19 DIAGNOSIS — L91.0 HYPERTROPHIC SCAR: ICD-10-CM

## 2022-09-19 DIAGNOSIS — L82.1 OTHER SEBORRHEIC KERATOSIS: ICD-10-CM

## 2022-09-19 DIAGNOSIS — L20.89 OTHER ATOPIC DERMATITIS: ICD-10-CM

## 2022-09-19 DIAGNOSIS — L85.3 XEROSIS CUTIS: ICD-10-CM

## 2022-09-19 PROBLEM — D22.5 MELANOCYTIC NEVI OF TRUNK: Status: ACTIVE | Noted: 2022-09-19

## 2022-09-19 PROBLEM — L20.84 INTRINSIC (ALLERGIC) ECZEMA: Status: ACTIVE | Noted: 2022-09-19

## 2022-09-19 PROCEDURE — ? PATIENT SPECIFIC COUNSELING

## 2022-09-19 PROCEDURE — ? PRESCRIPTION

## 2022-09-19 PROCEDURE — ? COUNSELING

## 2022-09-19 PROCEDURE — 99214 OFFICE O/P EST MOD 30 MIN: CPT

## 2022-09-19 RX ORDER — BETAMETHASONE DIPROPIONATE 0.5 MG/G
1 CREAM TOPICAL TWICE A DAY
Qty: 45 | Refills: 3 | Status: ERX

## 2022-09-19 ASSESSMENT — LOCATION ZONE DERM
LOCATION ZONE: LEG
LOCATION ZONE: TRUNK
LOCATION ZONE: ARM

## 2022-09-19 ASSESSMENT — LOCATION DETAILED DESCRIPTION DERM
LOCATION DETAILED: RIGHT PROXIMAL DORSAL FOREARM
LOCATION DETAILED: EPIGASTRIC SKIN
LOCATION DETAILED: LEFT INFERIOR MEDIAL UPPER BACK
LOCATION DETAILED: LEFT PROXIMAL DORSAL FOREARM
LOCATION DETAILED: RIGHT MID-UPPER BACK
LOCATION DETAILED: RIGHT SUPERIOR LATERAL UPPER BACK
LOCATION DETAILED: RIGHT PROXIMAL PRETIBIAL REGION
LOCATION DETAILED: RIGHT ANTERIOR DISTAL THIGH
LOCATION DETAILED: RIGHT LATERAL UPPER BACK
LOCATION DETAILED: LEFT PROXIMAL PRETIBIAL REGION

## 2022-09-19 ASSESSMENT — LOCATION SIMPLE DESCRIPTION DERM
LOCATION SIMPLE: RIGHT FOREARM
LOCATION SIMPLE: LEFT UPPER BACK
LOCATION SIMPLE: RIGHT UPPER BACK
LOCATION SIMPLE: ABDOMEN
LOCATION SIMPLE: LEFT PRETIBIAL REGION
LOCATION SIMPLE: LEFT FOREARM
LOCATION SIMPLE: RIGHT THIGH
LOCATION SIMPLE: RIGHT PRETIBIAL REGION

## 2022-09-19 NOTE — PROCEDURE: PATIENT SPECIFIC COUNSELING
Patient reports itching all over the body including the scalp, back, and lower legs.  Itching is mainly at night and it keeps him up at night.  He admits that he does not use any moisturizer. Exam show eczematous dermatitis mainly over the lower legs and xerosis on the back. I will start him on betamethasone dipropionate cream for the active areas of dermatitis. I also recommend use of daily moisturizer after showering for his xerosis; sample fo Eucerin Eczema Relief cream given. He can also take an OTC oral Benadryl at bedtime.
Detail Level: Detailed

## 2022-09-22 NOTE — PROGRESS NOTES
[address] [facility] Office Note  Chief Complaint:   Patient presents with:  Pre-Op Exam: 11/27/18 Dr Nima Caballero right sural nerve bx      HPI:   This is a 58year old male coming in for preop medical clearance for right sided temporal artery an Hospitalist Progress Note      Patient:  Mayra Hein    Unit/Bed:5K-16/016-A  YOB: 1934  MRN: [id number]   Acct: [de-identified]   PCP: Marco Jarvis MD  Date of Admission: 9/17/2022    Assessment/Plan:    Acute hypoxic respiratory failure, multifactorial  Aspiration pneumonia  COVID-19 pneumonia  Family has requested comfort measures only and declined PEG placement. Hospice met with family today, tentatively planning on DC to SNF with hospice care tomorrow. Due to persistent restlessness, prn ativan has been increased to 2mg IV q4h prn. Continue morphine. Acute on chronic HFrEF  EF 35% on most recent echo  DC lasix. CAD s/p CABG  Ok to DC asa/lipitor. SSS s/p aicd  Noted  Order for device rep to de active AICD  Persistent atrial fibrillation  Coumadin on hold due to supratherapeutic INR  Ok to DC digoxin and coumadin. Possible UTI  Will DC abx. HLD  DC statin  dementia  DC aricept  Kaposi sarcoma  Noted. Disposition: Pending discharge to SNF with hospice care in the morning pending clinical status. Chief Complaint: SOB    Hospital Course:    Patient is a 79year old frail elderly  Male with PMH s/p CABG, Persistent Atrial Fibrillation, SSS s/p Pacemaker placement, s/p AVR, GERD, HLD, AICD, and Kaposi's Sarcoma. He presented to [facility] on 9/18/22 with chief complaint of shortness of breath, hypoxia, and increased confusion. EMS stated that patient's Spo2 was 70% at [facility]. He was then placed on 15L non-rebreather mask with improvement of SpO2 to 97%. Tmax 102. SBP . He was then placed on BiPAP. RIJ CVC was placed and Levophed gtt started. COVID testing was positive. Up until about a week ago patient was living independently with caretakers coming by at least once a day to assist with meals and cleaning. Due to a fall about a week ago he was discharged to a nursing home.  On the morning of 9/17 patient Prostate cancer ([facility] 10/20/2016   • Pulmonary embolism ([facility] 2014    post-op bicep tendon repair   • Unspecified essential hypertension    • Unspecified sleep apnea     cpap   • V-tach [facility])    • Visual impairment     glasses   • Wears glasses    • Weight was seen by his family at the nursing home and he was not quite himself. Hypoxia was reported 24 hours prior to admission which O2 NC was placed at [facility]. The morning of admission his oxygen was low and he was positive for loose productive cough for the past week. He had fevers and rigors on the morning of admission. He subsequently got a CXR at [facility] which read as CHF, was evaluated by a physician and sent to the ED for further evaluation. He was admitted to the ICU for vasopressor support. Due to concern for aspiration pneumonia, he was given Vancomycin x1 and started on Cefepime on 9/18 and then Doxycycline on 9/19 for broad coverage. On 9/19 patient developed hemoptysis. He is on chronic coumadin due to his atrial fibrillation and his INR was 5.72 at that time. He was given FFP and Vitamin K. His INR has decreased to therapeutic levels on 9/20. He was able to be slowly titrated off vasopressors on 9/20 and was thus able to be transferred to stepdown. Subjective (past 24 hours):   Patient with increased restlessness today despite morphine/ativan therapy. Family present at bedside. The patient does deny having pain. All questions answered. Past medical history, family history, social history and allergies reviewed again and is unchanged since admission. ROS (12 point review of systems completed. Pertinent positives noted.  Otherwise ROS is negative)     Medications:  Reviewed    Infusion Medications   Scheduled Medications    alteplase  1 mg IntraCATHeter Once    [Held by provider] dexamethasone  6 mg IntraVENous Q24H    [Held by provider] carvedilol  6.25 mg Oral BID     PRN Meds: morphine, LORazepam, albuterol, acetaminophen **OR** acetaminophen, ondansetron **OR** ondansetron, guaiFENesin-dextromethorphan      Intake/Output Summary (Last 24 hours) at 9/22/2022 1447  Last data filed at 9/22/2022 1330  Gross per 24 hour   Intake 0 ml   Output 400 ml   Net -400 ml         Diet:  Diet PROCEDURE UNLISTED  03/2011    CERVICAL FUSION/HARDWARE   • STELLATE GANGLION BLOCK Left 1/7/2016    Performed by Karely Tran MD at [facility] MAIN OR   • TRIGGER POINT INJECTION Left 2/9/2017    Performed by Karely Tran MD at [address] [place] [address] Component Value Date/Time    LABURIN No growth-preliminary No growth  09/17/2022 08:20 PM       Respiratory culture: No results found for: CULTRESP    Aerobic and Anaerobic :  No results found for: LABAERO  No results found for: LABANAE    Urinalysis:      Lab Results   Component Value Date/Time    NITRU NEGATIVE 09/17/2022 03:00 PM    WBCUA 10-15 09/17/2022 03:00 PM    BACTERIA MODERATE 09/17/2022 03:00 PM    RBCUA 10-15 09/17/2022 03:00 PM    BLOODU NEGATIVE 09/17/2022 03:00 PM    SPECGRAV 1.019 12/08/2015 08:17 AM    GLUCOSEU NEGATIVE 09/17/2022 03:00 PM       Radiology:  [facility] MODIFIED BARIUM SWALLOW W VIDEO   Final Result   1. Laryngeal penetration and aspiration of thin barium. 2. Additional recommendations from the speech therapist will follow. **This report has been created using voice recognition software. It may contain minor errors which are inherent in voice recognition technology. **         Final report electronically signed by Dr. Zachary Marsh on 9/20/2022 12:05 PM      XR CHEST PORTABLE   Final Result      Interval worsening with near complete opacification of the left    hemithorax. This may be secondary to a large pleural effusion, atelectasis    or pneumonia. Remainder of the examination is grossly unchanged. This document has been electronically signed by: Lyndsey Ramirez MD on    09/20/2022 06:51 AM      XR CHEST PORTABLE   Final Result   Impression:   Stable left and moderate improvement right lung consolidations. This document has been electronically signed by: Padmini Kwon MD on    09/19/2022 06:17 AM      XR CHEST PORTABLE   Final Result   Impression:   Moderate cardiogenic pulmonary edema is favored over multifocal infection. This document has been electronically signed by: Brina Payne. Tuyet Buckner MD    on 09/18/2022 05:42 AM      CT Head WO Contrast   Final Result   No acute intracranial process. .          **This report has been created using voice recognition times daily. Disp:  Rfl:    Tiotropium Bromide Monohydrate (SPIRIVA HANDIHALER) 18 MCG Inhalation Cap Inhale 18 mcg into the lungs daily. Disp:  Rfl:    Nebivolol HCl (BYSTOLIC) 5 MG Oral Tab Take 5 mg by mouth every morning.    Disp:  Rfl:       Counseling all 4 quadrants, no hepatosplenomegaly  EXTREMITIES:  Strength intact with 5/5 bilaterally upper and lower extremities, no edema noted  NEURO:  CN 2 - 12 grossly intact     ASSESSMENT AND PLAN:   1.  Preoperative clearance  -  Pending lab results and EKG, w software. It may contain minor errors which are inherent in voice recognition technology. **      Final report electronically signed by Dr. Conrado Segura on 9/17/2022 6:50 PM      XR CHEST PORTABLE   Final Result   1. Moderate cardiomegaly. Permanent pacemaker/defibrillator. 2 prosthetic heart valves. Metallic sternotomy sutures. A right jugular line has been inserted with tip in the SVC. Calcified pleural plaques are again seen bilaterally. Cannot exclude a tiny    effusion right side. 2. Moderate interstitial infiltrates throughout both lungs, consistent with interstitial pneumonia/edema. Overall appearance of chest somewhat worse than prior. **This report has been created using voice recognition software. It may contain minor errors which are inherent in voice recognition technology. **      Final report electronically signed by Dr. Conrado Segura on 9/17/2022 5:18 PM      XR CHEST PORTABLE   Final Result   1. Moderate Noé. Permanent pacemaker/February. 2 prosthetic heart valves. Metallic sternotomy sutures. Prominent pleural plaque along right hemidiaphragm and another along the lateral aspect of the left lung base. 2. Moderate pneumonia/pulmonary edema both mid and lower lung fields, most severe left lung base. **This report has been created using voice recognition software. It may contain minor errors which are inherent in voice recognition technology. **      Final report electronically signed by Dr. Conrado Segura on 9/17/2022 2:53 PM        CT Head WO Contrast    Result Date: 9/17/2022  PROCEDURE: NONCONTRAST CT BRAIN CLINICAL INFORMATION: Altered mental status COMPARISON: No prior study TECHNIQUE: Multiple axial 5 mm images of the brain were obtained without administration of intravenous contrast material. ALL CT SCANS AT THIS FACILITY use dose modulation, iterative reconstruction, and/or weight-based dosing when appropriate to reduce radiation dose to as low as reasonably achievable. FINDINGS: VENTRICLES: There is mild enlargement of the ventricles diffusely, consistent with central atrophy. Moderate diffuse cerebral cortical atrophy and cerebellar cortical atrophy are also demonstrated. PARENCHYMA:  No acute infarction, mass lesion, or intracranial hemorrhage is seen. MASTOID PROCESSES: Well aerated. Normal in appearance. Emerita Raddle PARANASAL SINUSES/CALVARIUM: Unremarkable     No acute intracranial process. . **This report has been created using voice recognition software. It may contain minor errors which are inherent in voice recognition technology. ** Final report electronically signed by Dr. Vaishnavi Peters on 9/17/2022 6:50 PM    XR CHEST PORTABLE    Result Date: 9/18/2022  Chest Radiograph Comparison: 9/17/22 Findings: Right central venous catheter with the tip terminating at the cavoatrial junction. Cardiomegaly status post aortic and mitral valve replacement and left chest wall AICD. Normal mediastinal contours. No pneumothorax. Bilateral central opacity. Calcified pleural plaques. Small bilateral pleural effusions. Normal upper abdomen. No fracture. Impression: Moderate cardiogenic pulmonary edema is favored over multifocal infection. This document has been electronically signed by: Viri Patel. Ana Avalos MD on 09/18/2022 05:42 AM    XR CHEST PORTABLE    Result Date: 9/17/2022  PROCEDURE: XR CHEST PORTABLE CLINICAL INFORMATION: Central venous catheter insertion. Covid infection. COMPARISON: Earlier film same date, 1430 hours. TECHNIQUE: A single mobile view of the chest was obtained. 1. Moderate cardiomegaly. Permanent pacemaker/defibrillator. 2 prosthetic heart valves. Metallic sternotomy sutures. A right jugular line has been inserted with tip in the SVC. Calcified pleural plaques are again seen bilaterally. Cannot exclude a tiny effusion right side. 2. Moderate interstitial infiltrates throughout both lungs, consistent with interstitial pneumonia/edema.  Overall appearance of dermatitis     Costochondral chest pain     Hypertrophy of prostate with urinary obstruction and other lower urinary tract symptoms (LUTS)     Bilateral hip pain     Bursitis of both hips     Neoplasm of uncertain behavior of stomach, intestines, and rectu chest somewhat worse than prior. **This report has been created using voice recognition software. It may contain minor errors which are inherent in voice recognition technology. ** Final report electronically signed by Dr. Yariel Mcpherson on 9/17/2022 5:18 PM    XR CHEST PORTABLE    Result Date: 9/17/2022  PROCEDURE: XR CHEST PORTABLE CLINICAL INFORMATION: Altered mental status, Shortness of breath COMPARISON: 12/19/2021 TECHNIQUE: A single mobile view of the chest was obtained. 1. Moderate Noé. Permanent pacemaker/February. 2 prosthetic heart valves. Metallic sternotomy sutures. Prominent pleural plaque along right hemidiaphragm and another along the lateral aspect of the left lung base. 2. Moderate pneumonia/pulmonary edema both mid and lower lung fields, most severe left lung base. **This report has been created using voice recognition software. It may contain minor errors which are inherent in voice recognition technology. ** Final report electronically signed by Dr. Yariel Mcpherson on 9/17/2022 2:53 PM      Electronically signed by Tenzin Newsome DO on 9/22/2022 at 2:47 PM

## 2022-11-15 ENCOUNTER — OFFICE VISIT (OUTPATIENT)
Dept: URBAN - METROPOLITAN AREA CLINIC 54 | Facility: CLINIC | Age: 67
End: 2022-11-15
Payer: MEDICARE

## 2022-11-15 DIAGNOSIS — H43.813 BILATERAL VITREOUS DETACHMENT OF EYES: ICD-10-CM

## 2022-11-15 DIAGNOSIS — H35.3133 NEXDTVE AGE-REL MCLR DEGN, BI, ADV ATRPC WITHOUT SBFVL INVL: Primary | ICD-10-CM

## 2022-11-15 DIAGNOSIS — H25.13 AGE-RELATED NUCLEAR CATARACT, BILATERAL: ICD-10-CM

## 2022-11-15 PROCEDURE — 92014 COMPRE OPH EXAM EST PT 1/>: CPT | Performed by: OPHTHALMOLOGY

## 2022-11-15 PROCEDURE — 92134 CPTRZ OPH DX IMG PST SGM RTA: CPT | Performed by: OPHTHALMOLOGY

## 2022-11-15 ASSESSMENT — INTRAOCULAR PRESSURE
OS: 11
OD: 10

## 2022-11-15 NOTE — IMPRESSION/PLAN
Impression: Age-related nuclear cataract, bilateral: H25.13. Bilateral. Plan: Follow up with Dr. Favio Tinoco.

## 2022-11-15 NOTE — IMPRESSION/PLAN
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 Plan: Exam/OCT reveals RPE atrophy with no evidence of CNV OU. Discussed risk for developing NV AMD.  Advised patient to contact us immediately for any new metamorphopsia or decrease or vision. Referral sent to research department. 

Return in 1 year, OCT OU

## 2022-12-13 NOTE — PATIENT INSTRUCTIONS
Refill policies:    • Allow 2-3 business days for refills; controlled substances may take longer.   • Contact your pharmacy at least 5 days prior to running out of medication and have them send an electronic request or submit request through the “request re been approved by your insurer. Depending on your insurance carrier, approval may take 3-10 days. It is highly recommended patients contact their insurance carrier directly to determine coverage.   If test is done without insurance authorization, patient ma Fluconazole Counseling:  Patient counseled regarding adverse effects of fluconazole including but not limited to headache, diarrhea, nausea, upset stomach, liver function test abnormalities, taste disturbance, and stomach pain.  There is a rare possibility of liver failure that can occur when taking fluconazole.  The patient understands that monitoring of LFTs and kidney function test may be required, especially at baseline. The patient verbalized understanding of the proper use and possible adverse effects of fluconazole.  All of the patient's questions and concerns were addressed.

## 2023-01-01 NOTE — TELEPHONE ENCOUNTER
Called [facility] fast start [phone] and spoke with elissa pharmacist. Daniel Collado in nascobal nasal spray 500mcg spray one spray, 1 nostril one time a week for 3 months. Emailed patient and let him know.
IDM (infant of diabetic mother)

## 2023-02-27 ENCOUNTER — APPOINTMENT (RX ONLY)
Dept: URBAN - METROPOLITAN AREA CLINIC 158 | Facility: CLINIC | Age: 68
Setting detail: DERMATOLOGY
End: 2023-02-27

## 2023-02-27 DIAGNOSIS — L20.89 OTHER ATOPIC DERMATITIS: ICD-10-CM | Status: INADEQUATELY CONTROLLED

## 2023-02-27 PROBLEM — L20.84 INTRINSIC (ALLERGIC) ECZEMA: Status: ACTIVE | Noted: 2023-02-27

## 2023-02-27 PROCEDURE — 99214 OFFICE O/P EST MOD 30 MIN: CPT

## 2023-02-27 PROCEDURE — ? COUNSELING

## 2023-02-27 PROCEDURE — ? PRESCRIPTION

## 2023-02-27 PROCEDURE — ? TREATMENT REGIMEN

## 2023-02-27 RX ORDER — BETAMETHASONE DIPROPIONATE 0.5 MG/G
1 CREAM TOPICAL BID
Qty: 45 | Refills: 3 | Status: ERX

## 2023-02-27 RX ORDER — PREDNISONE 10 MG/1
1 TABLET ORAL AS DIRECTED
Qty: 30 | Refills: 0 | Status: ERX | COMMUNITY
Start: 2023-02-27

## 2023-02-27 RX ADMIN — PREDNISONE 1: 10 TABLET ORAL at 00:00

## 2023-02-27 ASSESSMENT — LOCATION SIMPLE DESCRIPTION DERM
LOCATION SIMPLE: RIGHT THIGH
LOCATION SIMPLE: RIGHT POSTERIOR THIGH
LOCATION SIMPLE: LEFT POSTERIOR THIGH
LOCATION SIMPLE: LEFT BUTTOCK
LOCATION SIMPLE: LEFT THIGH
LOCATION SIMPLE: RIGHT PRETIBIAL REGION
LOCATION SIMPLE: LEFT PRETIBIAL REGION
LOCATION SIMPLE: RIGHT BUTTOCK

## 2023-02-27 ASSESSMENT — LOCATION DETAILED DESCRIPTION DERM
LOCATION DETAILED: LEFT ANTERIOR DISTAL THIGH
LOCATION DETAILED: LEFT BUTTOCK
LOCATION DETAILED: RIGHT ANTERIOR DISTAL THIGH
LOCATION DETAILED: RIGHT BUTTOCK
LOCATION DETAILED: RIGHT DISTAL POSTERIOR THIGH
LOCATION DETAILED: LEFT DISTAL PRETIBIAL REGION
LOCATION DETAILED: LEFT DISTAL POSTERIOR THIGH
LOCATION DETAILED: RIGHT DISTAL PRETIBIAL REGION

## 2023-02-27 ASSESSMENT — BSA RASH: BSA RASH: 6

## 2023-02-27 ASSESSMENT — LOCATION ZONE DERM
LOCATION ZONE: LEG
LOCATION ZONE: TRUNK

## 2023-02-27 ASSESSMENT — SEVERITY ASSESSMENT 2020: SEVERITY 2020: MODERATE

## 2023-02-27 NOTE — PROCEDURE: TREATMENT REGIMEN
Plan: No hx childhood eczema, hx asthma, no environmental allergies. Diagnosed with asthma as child.  Has been using aquaphor only for moisturizer only, nothing else. And beta from previous derm. Started using dove soap in showers, recently started. Rash didn’t start from change of soap, no new laundry detergent. No new clothing without washing. Current medicines have been on 6+ months or longer, no new supplements. Pt says 3+ months of current flare. Has lived in [place] for about 4 years, year round. Moved from [place].\\n\\nRecommended moisturizer with thicker cream bid and body gel wash in shower. Moisturizer especially after showers and before bed. Discussed nbuvb in office vs. outdoor light at home. Also discussed oral medicine vs. biologics. Discussed prednisone to help acute flare resolve.  Is so itchy he isn't sleeping at night.  Will give to calm, then use moisturizers and topicals to prevent.  I \\n\\nLegs, feet, forearms, shoulders, buttocks 10-15% BSA. \\nWill prescribe prednisone for now and advised thicker moisturizer bid. Will also prescribe beta prn that is not clearing. Follow up 2 months.
Detail Level: Zone

## 2023-10-09 ENCOUNTER — APPOINTMENT (RX ONLY)
Dept: URBAN - METROPOLITAN AREA CLINIC 174 | Facility: CLINIC | Age: 68
Setting detail: DERMATOLOGY
End: 2023-10-09

## 2023-10-09 DIAGNOSIS — L30.4 ERYTHEMA INTERTRIGO: ICD-10-CM | Status: INADEQUATELY CONTROLLED

## 2023-10-09 PROCEDURE — 99214 OFFICE O/P EST MOD 30 MIN: CPT

## 2023-10-09 PROCEDURE — ? PRESCRIPTION MEDICATION MANAGEMENT

## 2023-10-09 PROCEDURE — ? COUNSELING

## 2023-10-09 PROCEDURE — ? PRESCRIPTION

## 2023-10-09 RX ORDER — CLINDAMYCIN PHOSPHATE 10 MG/ML
1 LOTION TOPICAL QD
Qty: 60 | Refills: 1 | Status: ERX | COMMUNITY
Start: 2023-10-09

## 2023-10-09 RX ORDER — NYSTATIN CREAM 100000 [USP'U]/G
1 CREAM TOPICAL QD
Qty: 30 | Refills: 1 | Status: ERX | COMMUNITY
Start: 2023-10-09

## 2023-10-09 RX ADMIN — NYSTATIN CREAM 1: 100000 CREAM TOPICAL at 00:00

## 2023-10-09 RX ADMIN — CLINDAMYCIN PHOSPHATE 1: 10 LOTION TOPICAL at 00:00

## 2023-10-09 ASSESSMENT — LOCATION SIMPLE DESCRIPTION DERM
LOCATION SIMPLE: RIGHT THIGH
LOCATION SIMPLE: GROIN

## 2023-10-09 ASSESSMENT — LOCATION DETAILED DESCRIPTION DERM
LOCATION DETAILED: SUPRAPUBIC SKIN
LOCATION DETAILED: RIGHT ANTERIOR PROXIMAL THIGH

## 2023-10-09 ASSESSMENT — LOCATION ZONE DERM
LOCATION ZONE: LEG
LOCATION ZONE: TRUNK

## 2023-10-09 ASSESSMENT — SEVERITY ASSESSMENT: SEVERITY: SEVERE

## 2023-10-09 NOTE — PROCEDURE: PRESCRIPTION MEDICATION MANAGEMENT
Detail Level: Zone
Plan: Finish the fluconazole given by PCP; just finished the Augmentin this morning. \\n\\nDiscussed adding a topical steroid if needed at next visit
Render In Strict Bullet Format?: No
Initiate Treatment: Clindamycin lotion once daily\\nNystatin cream once daily

## 2023-10-09 NOTE — ED INITIAL ASSESSMENT (HPI)
NG tube was plugged. Tube replaced and guidewire not coming out. Finasteride Pregnancy And Lactation Text: This medication is absolutely contraindicated during pregnancy. It is unknown if it is excreted in breast milk.

## 2023-10-09 NOTE — HPI: RASH
What Type Of Note Output Would You Prefer (Optional)?: Standard Output
Is The Patient Presenting As Previously Scheduled?: No, they are a work-in
How Severe Is Your Rash?: moderate
Is This A New Presentation, Or A Follow-Up?: Rash
Additional History: Patient states the current medications are not working.

## 2023-10-17 ENCOUNTER — APPOINTMENT (RX ONLY)
Dept: URBAN - METROPOLITAN AREA CLINIC 174 | Facility: CLINIC | Age: 68
Setting detail: DERMATOLOGY
End: 2023-10-17

## 2023-10-17 DIAGNOSIS — L30.4 ERYTHEMA INTERTRIGO: ICD-10-CM | Status: IMPROVED

## 2023-10-17 PROCEDURE — ? PRESCRIPTION

## 2023-10-17 PROCEDURE — ? COUNSELING

## 2023-10-17 PROCEDURE — 99214 OFFICE O/P EST MOD 30 MIN: CPT

## 2023-10-17 PROCEDURE — ? PRESCRIPTION MEDICATION MANAGEMENT

## 2023-10-17 RX ORDER — TRIAMCINOLONE ACETONIDE 1 MG/G
1 OINTMENT TOPICAL QD
Qty: 80 | Refills: 1 | Status: ERX | COMMUNITY
Start: 2023-10-17

## 2023-10-17 RX ORDER — CLINDAMYCIN PHOSPHATE 10 MG/ML
1 LOTION TOPICAL QD
Qty: 60 | Refills: 1 | Status: ERX

## 2023-10-17 RX ORDER — NYSTATIN CREAM 100000 [USP'U]/G
1 CREAM TOPICAL QD
Qty: 30 | Refills: 1 | Status: ERX

## 2023-10-17 RX ADMIN — TRIAMCINOLONE ACETONIDE 1: 1 OINTMENT TOPICAL at 00:00

## 2023-10-17 ASSESSMENT — LOCATION SIMPLE DESCRIPTION DERM
LOCATION SIMPLE: RIGHT THIGH
LOCATION SIMPLE: GROIN

## 2023-10-17 ASSESSMENT — LOCATION ZONE DERM
LOCATION ZONE: TRUNK
LOCATION ZONE: LEG

## 2023-10-17 ASSESSMENT — SEVERITY ASSESSMENT: SEVERITY: MODERATE TO SEVERE

## 2023-10-17 ASSESSMENT — LOCATION DETAILED DESCRIPTION DERM
LOCATION DETAILED: RIGHT ANTERIOR PROXIMAL THIGH
LOCATION DETAILED: SUPRAPUBIC SKIN

## 2023-10-17 NOTE — PROCEDURE: PRESCRIPTION MEDICATION MANAGEMENT
Detail Level: Zone
Render In Strict Bullet Format?: No
Initiate Treatment: Triamcinolone ointment nightly
Continue Regimen: Clindamycin lotion once daily\\nNystatin cream once daily

## 2023-10-24 ENCOUNTER — APPOINTMENT (RX ONLY)
Dept: URBAN - METROPOLITAN AREA CLINIC 174 | Facility: CLINIC | Age: 68
Setting detail: DERMATOLOGY
End: 2023-10-24

## 2023-10-24 DIAGNOSIS — L30.4 ERYTHEMA INTERTRIGO: ICD-10-CM | Status: IMPROVED

## 2023-10-24 DIAGNOSIS — L81.8 OTHER SPECIFIED DISORDERS OF PIGMENTATION: ICD-10-CM

## 2023-10-24 PROCEDURE — ? COUNSELING

## 2023-10-24 PROCEDURE — ? PATIENT SPECIFIC COUNSELING

## 2023-10-24 PROCEDURE — ? PRESCRIPTION MEDICATION MANAGEMENT

## 2023-10-24 PROCEDURE — ? PHOTO-DOCUMENTATION

## 2023-10-24 PROCEDURE — 99214 OFFICE O/P EST MOD 30 MIN: CPT

## 2023-10-24 ASSESSMENT — LOCATION ZONE DERM
LOCATION ZONE: LEG
LOCATION ZONE: TRUNK

## 2023-10-24 ASSESSMENT — LOCATION DETAILED DESCRIPTION DERM
LOCATION DETAILED: RIGHT ANTERIOR PROXIMAL THIGH
LOCATION DETAILED: SUPRAPUBIC SKIN

## 2023-10-24 ASSESSMENT — LOCATION SIMPLE DESCRIPTION DERM
LOCATION SIMPLE: GROIN
LOCATION SIMPLE: RIGHT THIGH

## 2023-10-24 ASSESSMENT — SEVERITY ASSESSMENT: SEVERITY: ALMOST CLEAR

## 2023-10-24 NOTE — PROCEDURE: PHOTO-DOCUMENTATION
Details (Free Text): Suprapubic areas
Photo Preface (Leave Blank If You Do Not Want): Photographs were obtained today
Detail Level: Zone

## 2023-10-24 NOTE — PROCEDURE: PRESCRIPTION MEDICATION MANAGEMENT
Detail Level: Zone
Render In Strict Bullet Format?: No
Continue Regimen: Much improved almost clear, residual erythema. Continue nystatin cream qam, Clindamycin lotion evening then Triamcinolone ointment at bedtime for one more week.

## 2023-10-24 NOTE — PROCEDURE: PATIENT SPECIFIC COUNSELING
Intertrigo well controlled, residual Post-inflammatory erythema. \\n \\n(Patient is scheduled for upcoming urolift for prostate surgery.)
Detail Level: Zone

## 2023-10-25 ENCOUNTER — RX ONLY (OUTPATIENT)
Age: 68
Setting detail: RX ONLY
End: 2023-10-25

## 2023-10-25 RX ORDER — BETAMETHASONE DIPROPIONATE 0.5 MG/G
1 CREAM TOPICAL BID
Qty: 45 | Refills: 0 | Status: ERX

## 2023-11-08 NOTE — TELEPHONE ENCOUNTER
Received a fax stating pt no longer has coverage as of 12.31.16.     Please advise Restorative Technician Note      Patient Name: Chacorta Bangura     Restorative Tech Visit Date: 11/08/23  Note Type: Mobility  Patient Position Upon Consult: Bedside chair  Activity Performed: Ambulated  Assistive Device: Roller walker  Patient Position at End of Consult: Bedside chair;  All needs within reach    Keshawn Bone, Restorative Technician

## 2023-12-06 ENCOUNTER — OFFICE VISIT (OUTPATIENT)
Dept: URBAN - METROPOLITAN AREA CLINIC 13 | Facility: CLINIC | Age: 68
End: 2023-12-06
Payer: MEDICARE

## 2023-12-06 DIAGNOSIS — H43.813 BILATERAL VITREOUS DETACHMENT OF EYES: ICD-10-CM

## 2023-12-06 DIAGNOSIS — H25.13 AGE-RELATED NUCLEAR CATARACT, BILATERAL: ICD-10-CM

## 2023-12-06 PROCEDURE — 99214 OFFICE O/P EST MOD 30 MIN: CPT | Performed by: OPHTHALMOLOGY

## 2023-12-06 PROCEDURE — 92134 CPTRZ OPH DX IMG PST SGM RTA: CPT | Performed by: OPHTHALMOLOGY

## 2023-12-06 ASSESSMENT — INTRAOCULAR PRESSURE
OD: 15
OS: 14

## 2024-01-16 ENCOUNTER — OFFICE VISIT (OUTPATIENT)
Dept: URBAN - METROPOLITAN AREA CLINIC 54 | Facility: CLINIC | Age: 69
End: 2024-01-16
Payer: MEDICARE

## 2024-01-16 DIAGNOSIS — H35.3133 NONEXUDATIVE AGE-RELATED MACULAR DEGENERATION, BILATERAL, ADVANCED ATROPHIC WITHOUT SUBFOVEAL INVOLVEMENT: Primary | ICD-10-CM

## 2024-01-16 PROCEDURE — 67028 INJECTION EYE DRUG: CPT | Performed by: OPHTHALMOLOGY

## 2024-01-16 PROCEDURE — 92134 CPTRZ OPH DX IMG PST SGM RTA: CPT | Performed by: OPHTHALMOLOGY

## 2024-01-16 ASSESSMENT — INTRAOCULAR PRESSURE
OS: 10
OD: 10

## 2024-02-27 ENCOUNTER — OFFICE VISIT (OUTPATIENT)
Dept: URBAN - METROPOLITAN AREA CLINIC 54 | Facility: CLINIC | Age: 69
End: 2024-02-27
Payer: MEDICARE

## 2024-02-27 DIAGNOSIS — H35.3133 NONEXUDATIVE AGE-RELATED MACULAR DEGENERATION, BILATERAL, ADVANCED ATROPHIC WITHOUT SUBFOVEAL INVOLVEMENT: Primary | ICD-10-CM

## 2024-02-27 PROCEDURE — 92134 CPTRZ OPH DX IMG PST SGM RTA: CPT | Performed by: OPHTHALMOLOGY

## 2024-02-27 PROCEDURE — 67028 INJECTION EYE DRUG: CPT | Performed by: OPHTHALMOLOGY

## 2024-02-27 ASSESSMENT — INTRAOCULAR PRESSURE
OS: 12
OD: 13

## 2024-03-19 NOTE — ED NOTES
Per xray and radiology the RN will go with the patient to xray and they will help place the new feeding tube  Xray will call when the room is ready 25

## 2024-04-15 ENCOUNTER — OFFICE VISIT (OUTPATIENT)
Dept: URBAN - METROPOLITAN AREA CLINIC 54 | Facility: CLINIC | Age: 69
End: 2024-04-15
Payer: MEDICARE

## 2024-04-15 DIAGNOSIS — H35.3133 NONEXUDATIVE AGE-RELATED MACULAR DEGENERATION, BILATERAL, ADVANCED ATROPHIC WITHOUT SUBFOVEAL INVOLVEMENT: Primary | ICD-10-CM

## 2024-04-15 PROCEDURE — 92134 CPTRZ OPH DX IMG PST SGM RTA: CPT | Performed by: OPHTHALMOLOGY

## 2024-04-15 ASSESSMENT — INTRAOCULAR PRESSURE
OD: 13
OS: 13

## 2024-05-27 NOTE — MR AVS SNAPSHOT
[facility] [employer] [place] [address] [phone]               Thank you for choosing us for your health care visit with Naye Easley MD.  We are glad to serve you and happy to provide you with this summary of your v Chronic fatigue [R53.82], Morbid obesity with BMI of 40.0-44.9, adult ([facility] [E66.01, Z68.41]           Ferritin    Complete by:   Apr 06, 2017 (Approximate)    Assoc Dx:  Essential hypertension [I10], Hypercholesterolemia [E78.00], Chronic fatigue [R53.82], lisinopril 20 MG Tabs   Take 20 mg by mouth daily. Commonly known as:  PRINIVIL,ZESTRIL           Mexiletine HCl 150 MG Caps   Take 150 mg by mouth 2 (two) times daily. Commonly known as:  MEXITIL           MULTIVITAMIN OR   Take  by mouth. Call [phone] for help. [employer] is NOT to be used for urgent needs. For medical emergencies, dial 911.            Visit [facility] online at  [email or web address] 27-May-2024 01:45

## 2024-05-28 ENCOUNTER — OFFICE VISIT (OUTPATIENT)
Dept: URBAN - METROPOLITAN AREA CLINIC 54 | Facility: CLINIC | Age: 69
End: 2024-05-28
Payer: MEDICARE

## 2024-05-28 DIAGNOSIS — H35.3133 NONEXUDATIVE AGE-RELATED MACULAR DEGENERATION, BILATERAL, ADVANCED ATROPHIC WITHOUT SUBFOVEAL INVOLVEMENT: Primary | ICD-10-CM

## 2024-05-28 PROCEDURE — 92134 CPTRZ OPH DX IMG PST SGM RTA: CPT | Performed by: OPHTHALMOLOGY

## 2024-05-28 ASSESSMENT — INTRAOCULAR PRESSURE
OS: 10
OD: 8

## 2024-06-06 NOTE — TELEPHONE ENCOUNTER
1. What are your symptoms? Pt fell and hit left side of head yesterday      2. How long have you been having these symptoms? yesterday    3. Have you done anything already to treat your symptoms?      no    ADDITIONAL INFO: 29.2

## 2024-07-02 ENCOUNTER — OFFICE VISIT (OUTPATIENT)
Dept: URBAN - METROPOLITAN AREA CLINIC 54 | Facility: CLINIC | Age: 69
End: 2024-07-02
Payer: MEDICARE

## 2024-07-02 DIAGNOSIS — H35.3133 NONEXUDATIVE AGE-RELATED MACULAR DEGENERATION, BILATERAL, ADVANCED ATROPHIC WITHOUT SUBFOVEAL INVOLVEMENT: Primary | ICD-10-CM

## 2024-07-02 PROCEDURE — 92134 CPTRZ OPH DX IMG PST SGM RTA: CPT | Performed by: OPHTHALMOLOGY

## 2024-07-02 ASSESSMENT — INTRAOCULAR PRESSURE
OD: 9
OS: 7

## 2024-08-13 ENCOUNTER — APPOINTMENT (RX ONLY)
Dept: URBAN - METROPOLITAN AREA CLINIC 158 | Facility: CLINIC | Age: 69
Setting detail: DERMATOLOGY
End: 2024-08-13

## 2024-08-13 DIAGNOSIS — L20.89 OTHER ATOPIC DERMATITIS: ICD-10-CM | Status: INADEQUATELY CONTROLLED

## 2024-08-13 PROBLEM — L30.9 DERMATITIS, UNSPECIFIED: Status: ACTIVE | Noted: 2024-08-13

## 2024-08-13 PROCEDURE — ? TREATMENT REGIMEN

## 2024-08-13 PROCEDURE — ? BIOPSY BY PUNCH METHOD

## 2024-08-13 PROCEDURE — 99214 OFFICE O/P EST MOD 30 MIN: CPT | Mod: 25

## 2024-08-13 PROCEDURE — ? PRESCRIPTION

## 2024-08-13 PROCEDURE — ? COUNSELING

## 2024-08-13 PROCEDURE — 11104 PUNCH BX SKIN SINGLE LESION: CPT

## 2024-08-13 RX ORDER — BETAMETHASONE DIPROPIONATE 0.5 MG/G
1 OINTMENT TOPICAL BID
Qty: 90 | Refills: 2 | Status: ERX | COMMUNITY
Start: 2024-08-13

## 2024-08-13 RX ADMIN — BETAMETHASONE DIPROPIONATE 1: 0.5 OINTMENT TOPICAL at 00:00

## 2024-08-13 ASSESSMENT — LOCATION ZONE DERM
LOCATION ZONE: FEET
LOCATION ZONE: TRUNK
LOCATION ZONE: LEG

## 2024-08-13 ASSESSMENT — LOCATION DETAILED DESCRIPTION DERM
LOCATION DETAILED: RIGHT SUPERIOR MEDIAL UPPER BACK
LOCATION DETAILED: RIGHT DISTAL LATERAL PRETIBIAL REGION
LOCATION DETAILED: RIGHT DISTAL PRETIBIAL REGION
LOCATION DETAILED: RIGHT ANTERIOR PROXIMAL THIGH
LOCATION DETAILED: RIGHT LATERAL ACHILLES SKIN

## 2024-08-13 ASSESSMENT — BSA RASH: BSA RASH: 10

## 2024-08-13 ASSESSMENT — LOCATION SIMPLE DESCRIPTION DERM
LOCATION SIMPLE: RIGHT THIGH
LOCATION SIMPLE: RIGHT FOOT
LOCATION SIMPLE: RIGHT PRETIBIAL REGION
LOCATION SIMPLE: RIGHT UPPER BACK
LOCATION SIMPLE: RIGHT LOWER LEG

## 2024-08-13 ASSESSMENT — SEVERITY ASSESSMENT 2020: SEVERITY 2020: MODERATE

## 2024-08-13 ASSESSMENT — ITCH NUMERIC RATING SCALE: HOW SEVERE IS YOUR ITCHING?: 8

## 2024-08-13 NOTE — PROCEDURE: TREATMENT REGIMEN
Plan: Acute weeping eczematous dermatitis in setting of chronic eczema.  Flared about one month ago.  No obvious cause, no change in products.  No new medicines by mouth.   \\n Can't help from scratching.  Using moisturizers and clindamycin lotion and clotrimazole from prior physicians, but not helping.  Also placed on augmenting 875mg bid.  Nothing helping at this point.  RX: \\n betamethasone ointment 0.05% bid for two weeks.  aquaphor as moisturizer.  No other topicals.  f/u 2 weeks in clinic.  Due to weeping right ankle can use vinegar soaks for 1-2 days.  1 part vinegar and 4 parts water for legs and feet. \\nAsthma well controlled
Initiate Treatment: Betamethasone ointment apply bid x 2 weeks on legs and groin x 2-3 times a week.
Detail Level: Zone

## 2024-08-13 NOTE — PROCEDURE: BIOPSY BY PUNCH METHOD
Detail Level: Detailed
Was A Bandage Applied: Yes
Lab: [id number]
Punch Size In Mm: 4
Size Of Lesion In Cm (Optional): 0
Depth Of Punch Biopsy: dermis
Biopsy Type: H and E
Anesthesia Type: 1% lidocaine with epinephrine
Anesthesia Volume In Cc: 0.5
Hemostasis: None
Epidermal Sutures: gel foam
Wound Care: Petrolatum
Dressing: bandage
Patient Will Remove Sutures At Home?: No
Path Notes (To The Dermatopathologist): tender dermal nodule concerning for infection, but not improving on abx, no h/o trauma, no prior.  half of punch sent for tissue culture deep wound, fungal, atypical mycobacterial
Consent: Written consent was obtained and risks were reviewed including but not limited to scarring, infection, bleeding, scabbing, incomplete removal, nerve damage and allergy to anesthesia.
Post-Care Instructions: I reviewed with the patient in detail post-care instructions. Patient is to keep the biopsy site dry overnight, and then apply bacitracin twice daily until healed. Patient may apply hydrogen peroxide soaks to remove any crusting.
Home Suture Removal Text: Patient was provided a home suture removal kit and will remove their sutures at home.  If they have any questions or difficulties they will call the office.
Notification Instructions: Patient will be notified of biopsy results. However, patient instructed to call the office if not contacted within 2 weeks.
Billing Type: Third-Party Bill
Information: Selecting Yes will display possible errors in your note based on the variables you have selected. This validation is only offered as a suggestion for you. PLEASE NOTE THAT THE VALIDATION TEXT WILL BE REMOVED WHEN YOU FINALIZE YOUR NOTE. IF YOU WANT TO FAX A PRELIMINARY NOTE YOU WILL NEED TO TOGGLE THIS TO 'NO' IF YOU DO NOT WANT IT IN YOUR FAXED NOTE.
Lab Facility: [id number]

## 2024-08-22 ENCOUNTER — APPOINTMENT (RX ONLY)
Dept: URBAN - METROPOLITAN AREA CLINIC 158 | Facility: CLINIC | Age: 69
Setting detail: DERMATOLOGY
End: 2024-08-22

## 2024-08-22 DIAGNOSIS — L12.0 BULLOUS PEMPHIGOID: ICD-10-CM

## 2024-08-22 DIAGNOSIS — L20.89 OTHER ATOPIC DERMATITIS: ICD-10-CM | Status: IMPROVED

## 2024-08-22 PROBLEM — L30.9 DERMATITIS, UNSPECIFIED: Status: ACTIVE | Noted: 2024-08-22

## 2024-08-22 PROCEDURE — ? BIOPSY BY PUNCH METHOD FOR DIF

## 2024-08-22 PROCEDURE — ? PRESCRIPTION

## 2024-08-22 PROCEDURE — 11105 PUNCH BX SKIN EA SEP/ADDL: CPT

## 2024-08-22 PROCEDURE — ? ORDER TESTS

## 2024-08-22 PROCEDURE — ? TREATMENT REGIMEN

## 2024-08-22 PROCEDURE — ? BIOPSY BY PUNCH METHOD

## 2024-08-22 PROCEDURE — ? COUNSELING

## 2024-08-22 PROCEDURE — 11104 PUNCH BX SKIN SINGLE LESION: CPT

## 2024-08-22 PROCEDURE — 99214 OFFICE O/P EST MOD 30 MIN: CPT | Mod: 25

## 2024-08-22 RX ORDER — PREDNISONE 10 MG/1
1 TABLET ORAL AS DIRECTED
Qty: 50 | Refills: 0 | Status: ERX

## 2024-08-22 ASSESSMENT — LOCATION SIMPLE DESCRIPTION DERM
LOCATION SIMPLE: LEFT AXILLARY VAULT
LOCATION SIMPLE: RIGHT POSTERIOR AXILLA
LOCATION SIMPLE: ABDOMEN
LOCATION SIMPLE: RIGHT UPPER BACK
LOCATION SIMPLE: POSTERIOR NECK
LOCATION SIMPLE: LEFT UPPER BACK
LOCATION SIMPLE: CHEST

## 2024-08-22 ASSESSMENT — LOCATION ZONE DERM
LOCATION ZONE: TRUNK
LOCATION ZONE: AXILLAE
LOCATION ZONE: NECK

## 2024-08-22 ASSESSMENT — LOCATION DETAILED DESCRIPTION DERM
LOCATION DETAILED: LEFT RIB CAGE
LOCATION DETAILED: LEFT LATERAL SUPERIOR CHEST
LOCATION DETAILED: LEFT AXILLARY VAULT
LOCATION DETAILED: LEFT SUPERIOR UPPER BACK
LOCATION DETAILED: LEFT INFERIOR LATERAL NECK
LOCATION DETAILED: RIGHT POSTERIOR AXILLA
LOCATION DETAILED: RIGHT SUPERIOR LATERAL UPPER BACK
LOCATION DETAILED: LEFT INFERIOR UPPER BACK
LOCATION DETAILED: EPIGASTRIC SKIN
LOCATION DETAILED: LEFT LATERAL ABDOMEN
LOCATION DETAILED: LEFT LATERAL INFERIOR CHEST

## 2024-08-22 NOTE — PROCEDURE: BIOPSY BY PUNCH METHOD FOR DIF
Detail Level: Detailed
Was A Bandage Applied: Yes
Punch Size In Mm: 4
Size Of Lesion In Cm (Optional): 0
Depth Of Punch Biopsy: dermis
Biopsy Type: DIF (perilesional)
Anesthesia Type: 1% lidocaine with epinephrine
Anesthesia Volume In Cc: 0.5
Hemostasis: None
Epidermal Sutures: 4-0 Ethilon
Wound Care: Petrolatum
Dressing: bandage
Suture Removal: 14 days
Patient Will Remove Sutures At Home?: No
Lab: [id number]
Consent: Written consent was obtained and risks were reviewed including but not limited to scarring, infection, bleeding, scabbing, incomplete removal, nerve damage and allergy to anesthesia.
Post-Care Instructions: I reviewed with the patient in detail post-care instructions. Patient is to keep the biopsy site dry overnight, and then apply bacitracin twice daily until healed. Patient may apply hydrogen peroxide soaks to remove any crusting.
Home Suture Removal Text: Patient was provided a home suture removal kit and will remove their sutures at home.  If they have any questions or difficulties they will call the office.
Notification Instructions: Patient will be notified of biopsy results. However, patient instructed to call the office if not contacted within 2 weeks.
Billing Type: Third-Party Bill

## 2024-08-22 NOTE — PROCEDURE: ORDER TESTS
Billing Type: Third-Party Bill
Bill For Surgical Tray: no
Performing Laboratory: [id number]
Expected Date Of Service: 08/22/2024

## 2024-08-22 NOTE — PROCEDURE: BIOPSY BY PUNCH METHOD

## 2024-08-22 NOTE — PROCEDURE: TREATMENT REGIMEN
Detail Level: Zone
Plan: Weeping eczematous plaques lower leg, calf, abdomen all nearly clear at this point with residual post inflammatory erythema.  Now with new dermatitis progressive on arms, chest, back with dermal edematous papules and small plaques that are extremely itchy.  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  Pt also with h/o asthma, so baseline dermatitis likely atopic derm.  However DDX for new dermatitis would be medication reaction (no new medicines, but increased dose of metoprolol).  Would include cocci in DDX as well, will get CXR, cocci serology and bullous pemphigoid also in DDX.  Will call with path results and DIF.  Start prednisone 30mg x 5 days, 20mg x 5 days, 10mg until know results.

## 2024-09-03 ENCOUNTER — OFFICE VISIT (OUTPATIENT)
Dept: URBAN - METROPOLITAN AREA CLINIC 54 | Facility: CLINIC | Age: 69
End: 2024-09-03
Payer: MEDICARE

## 2024-09-03 DIAGNOSIS — H35.3133 NONEXUDATIVE AGE-RELATED MACULAR DEGENERATION, BILATERAL, ADVANCED ATROPHIC WITHOUT SUBFOVEAL INVOLVEMENT: Primary | ICD-10-CM

## 2024-09-03 PROCEDURE — 92134 CPTRZ OPH DX IMG PST SGM RTA: CPT | Performed by: OPHTHALMOLOGY

## 2024-09-03 ASSESSMENT — INTRAOCULAR PRESSURE
OS: 13
OD: 9

## 2024-09-12 ENCOUNTER — APPOINTMENT (RX ONLY)
Dept: URBAN - METROPOLITAN AREA CLINIC 158 | Facility: CLINIC | Age: 69
Setting detail: DERMATOLOGY
End: 2024-09-12

## 2024-09-12 ENCOUNTER — RX ONLY (OUTPATIENT)
Age: 69
Setting detail: RX ONLY
End: 2024-09-12

## 2024-09-12 DIAGNOSIS — L12.0 BULLOUS PEMPHIGOID: ICD-10-CM

## 2024-09-12 DIAGNOSIS — L20.89 OTHER ATOPIC DERMATITIS: ICD-10-CM

## 2024-09-12 PROCEDURE — ? TREATMENT REGIMEN

## 2024-09-12 PROCEDURE — ? COUNSELING

## 2024-09-12 PROCEDURE — ? PRESCRIPTION

## 2024-09-12 PROCEDURE — 99214 OFFICE O/P EST MOD 30 MIN: CPT

## 2024-09-12 PROCEDURE — ? ORDER TESTS

## 2024-09-12 RX ORDER — DUPILUMAB 300 MG/2ML
1 INJECTION, SOLUTION SUBCUTANEOUS AS DIRECTED
Qty: 2 | Refills: 11 | Status: ERX | COMMUNITY
Start: 2024-09-12

## 2024-09-12 RX ORDER — DUPILUMAB 300 MG/2ML
1 INJECTION, SOLUTION SUBCUTANEOUS AS DIRECTED
Qty: 2 | Refills: 0 | Status: ERX | COMMUNITY
Start: 2024-09-12

## 2024-09-12 RX ORDER — PREDNISONE 10 MG/1
1 TABLET ORAL AS DIRECTED
Qty: 60 | Refills: 0 | Status: ERX

## 2024-09-12 RX ADMIN — DUPILUMAB 1: 300 INJECTION, SOLUTION SUBCUTANEOUS at 00:00

## 2024-09-12 ASSESSMENT — LOCATION DETAILED DESCRIPTION DERM
LOCATION DETAILED: RIGHT SUPERIOR LATERAL UPPER BACK
LOCATION DETAILED: LEFT LATERAL INFERIOR CHEST
LOCATION DETAILED: LEFT INFERIOR LATERAL NECK
LOCATION DETAILED: RIGHT POSTERIOR AXILLA
LOCATION DETAILED: LEFT INFERIOR UPPER BACK
LOCATION DETAILED: LEFT SUPERIOR UPPER BACK
LOCATION DETAILED: LEFT AXILLARY VAULT
LOCATION DETAILED: EPIGASTRIC SKIN
LOCATION DETAILED: LEFT LATERAL SUPERIOR CHEST

## 2024-09-12 ASSESSMENT — BSA RASH: BSA RASH: 40

## 2024-09-12 ASSESSMENT — LOCATION SIMPLE DESCRIPTION DERM
LOCATION SIMPLE: RIGHT UPPER BACK
LOCATION SIMPLE: CHEST
LOCATION SIMPLE: ABDOMEN
LOCATION SIMPLE: RIGHT POSTERIOR AXILLA
LOCATION SIMPLE: LEFT AXILLARY VAULT
LOCATION SIMPLE: POSTERIOR NECK
LOCATION SIMPLE: LEFT UPPER BACK

## 2024-09-12 ASSESSMENT — LOCATION ZONE DERM
LOCATION ZONE: TRUNK
LOCATION ZONE: NECK
LOCATION ZONE: AXILLAE

## 2024-09-12 ASSESSMENT — ITCH NUMERIC RATING SCALE: HOW SEVERE IS YOUR ITCHING?: 9

## 2024-09-12 ASSESSMENT — SEVERITY ASSESSMENT 2020: SEVERITY 2020: MODERATE

## 2024-09-12 NOTE — PROCEDURE: TREATMENT REGIMEN
Discontinue Regimen: Vinegar soak
Plan: Discussed Dupixent, last ten years eczema started
Detail Level: Zone
Plan: Weeping eczematous plaques lower leg, calf, abdomen all nearly clear at this point with residual post inflammatory erythema.  Now with new dermatitis progressive on arms, chest, back with dermal edematous papules and small plaques that are extremely itchy.  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  Pt also with h/o asthma, so baseline dermatitis likely atopic derm.  However DDX for new dermatitis would be medication reaction (no new medicines, but increased dose of metoprolol).  Would include cocci in DDX as well, will get CXR, cocci serology and bullous pemphigoid also in DDX.  Will call with path results and DIF.  Start prednisone 30mg x 5 days, 20mg x 5 days, 10mg until know results.\\n\\n9/24. Both eczema and new onset BP flaring off of oral steroids.  Will start PA for dupixent minimum of 40% BSA at this time.  Dupixent has been shown to be effective in treating bullous pemphigoid as well, so can treat both with one medicine.  
Initiate Treatment: Prednisone 10mg take 3 pills daily for 2 weeks, then reduce to  2 pills daily.  
Plan: Pt with longstanding eczema 15-20% BSA but also with new diagnosis of bullous pemphigoid by biopsy and DIF.  Itching is miserable at this point, not sleeping well.  Has both eczema areas and more edematous papules on exam today c/w BP.  As he tapered of recent steroids, rash worsening.  Will restart oral steroids today.  Not diabetic.  Because he has significant eczema and BP, will start dupixent as this has been shown to control BP as well.  Will taper steroids once on dupixent and well controlled.  Will have lab redraw his BpAG 180/230 because they ran wrong test last time.  His Cocci labs were negative.

## 2024-09-12 NOTE — PROCEDURE: ORDER TESTS
Bill For Surgical Tray: no
Expected Date Of Service: 09/12/2024
Billing Type: Third-Party Bill
Performing Laboratory: 0

## 2024-09-16 ENCOUNTER — RX ONLY (OUTPATIENT)
Age: 69
Setting detail: RX ONLY
End: 2024-09-16

## 2024-09-16 RX ORDER — DUPILUMAB 300 MG/2ML
1 INJECTION, SOLUTION SUBCUTANEOUS AS DIRECTED
Qty: 2 | Refills: 0 | Status: ERX

## 2024-09-16 RX ORDER — DUPILUMAB 300 MG/2ML
1 INJECTION, SOLUTION SUBCUTANEOUS AS DIRECTED
Qty: 2 | Refills: 11 | Status: ERX

## 2024-10-29 ENCOUNTER — OFFICE VISIT (OUTPATIENT)
Dept: URBAN - METROPOLITAN AREA CLINIC 54 | Facility: CLINIC | Age: 69
End: 2024-10-29
Payer: MEDICARE

## 2024-10-29 DIAGNOSIS — H35.3133 NONEXUDATIVE AGE-RELATED MACULAR DEGENERATION, BILATERAL, ADVANCED ATROPHIC WITHOUT SUBFOVEAL INVOLVEMENT: Primary | ICD-10-CM

## 2024-10-29 PROCEDURE — 92134 CPTRZ OPH DX IMG PST SGM RTA: CPT | Performed by: OPHTHALMOLOGY

## 2024-10-29 ASSESSMENT — INTRAOCULAR PRESSURE
OS: 10
OD: 11

## 2024-12-12 ENCOUNTER — RX ONLY (RX ONLY)
Age: 69
End: 2024-12-12

## 2024-12-12 ENCOUNTER — APPOINTMENT (OUTPATIENT)
Dept: URBAN - METROPOLITAN AREA CLINIC 158 | Facility: CLINIC | Age: 69
Setting detail: DERMATOLOGY
End: 2024-12-12

## 2024-12-12 DIAGNOSIS — L29.89 OTHER PRURITUS: ICD-10-CM

## 2024-12-12 DIAGNOSIS — L20.89 OTHER ATOPIC DERMATITIS: ICD-10-CM | Status: INADEQUATELY CONTROLLED

## 2024-12-12 PROCEDURE — ? COUNSELING

## 2024-12-12 PROCEDURE — ? TREATMENT REGIMEN

## 2024-12-12 PROCEDURE — 96910 PHOTCHMTX TAR&UVB/PTRLTM&UVB: CPT

## 2024-12-12 PROCEDURE — 99213 OFFICE O/P EST LOW 20 MIN: CPT | Mod: 25

## 2024-12-12 PROCEDURE — ? PHOTOTHERAPY TREATMENT

## 2024-12-12 PROCEDURE — ? PRESCRIPTION

## 2024-12-12 RX ORDER — HYDROXYZINE HYDROCHLORIDE 25 MG/1
1 TABLET, FILM COATED ORAL QHS
Qty: 30 | Refills: 0 | Status: ERX | COMMUNITY
Start: 2024-12-12

## 2024-12-12 RX ORDER — HYDROXYZINE HYDROCHLORIDE 25 MG/1
1 TABLET, FILM COATED ORAL QHS
Qty: 30 | Refills: 0 | Status: ERX

## 2024-12-12 RX ORDER — PREDNISONE 10 MG/1
1 TABLET ORAL AS DIRECTED
Qty: 30 | Refills: 0 | Status: ERX

## 2024-12-12 RX ORDER — HYDROXYZINE HYDROCHLORIDE 25 MG/1
1 TABLET, FILM COATED ORAL QD
Qty: 30 | Refills: 0 | Status: ERX

## 2024-12-12 RX ADMIN — HYDROXYZINE HYDROCHLORIDE 1: 25 TABLET, FILM COATED ORAL at 00:00

## 2024-12-12 ASSESSMENT — LOCATION SIMPLE DESCRIPTION DERM: LOCATION SIMPLE: LEFT UPPER BACK

## 2024-12-12 ASSESSMENT — LOCATION DETAILED DESCRIPTION DERM: LOCATION DETAILED: LEFT SUPERIOR MEDIAL UPPER BACK

## 2024-12-12 ASSESSMENT — LOCATION ZONE DERM: LOCATION ZONE: TRUNK

## 2024-12-12 NOTE — PROCEDURE: PHOTOTHERAPY TREATMENT
Protocol For Bath Puva: The patient received Bath PUVA.
Protocol For Uva1: The patient received UVA1.
Protocol For Photochemotherapy For Severe Photoresponsive Dermatoses: Tar And Nbuvb (Goeckerman Treatment): The patient received Photochemotherapy for severe photoresponsive dermatoses: Tar and NBUVB (Goeckerman treatment) requiring at least 4 to 8 hours of care under direct physician supervision.
Post-Care Instructions: I reviewed with the patient in detail post-care instructions. Patient is to wear sun protection. Patients may expect sunburn like redness, discomfort and scabbing.
Protocol For Photochemotherapy For Severe Photoresponsive Dermatoses: Tar And Broad Band Uvb (Goeckerman Treatment): The patient received Photochemotherapy for severe photoresponsive dermatoses: Tar and Broad Band UVB (Goeckerman treatment) requiring at least 4 to 8 hours of care under direct physician supervision.
Protocol For Photochemotherapy: Petrolatum And Nbuvb: The patient received Photochemotherapy: Petrolatum and NBUVB (petrolatum applied to all lesions prior to phototherapy).
No
Total Body Time: 38 Seconds
Protocol For Broad Band Uvb: The patient received Broad Band UVB.
Protocol For Protocol For Photochemotherapy For Severe Photoresponsive Dermatoses: Bath Puva: The patient received Photochemotherapy for severe photoresponsive dermatoses: Bath PUVA requiring at least 4 to 8 hours of care under direct physician supervision.
Protocol For Photochemotherapy For Severe Photoresponsive Dermatoses: Petrolatum And Nbuvb: The patient received Photochemotherapy for severe photoresponsive dermatoses: Petrolatum and NBUVB requiring at least 4 to 8 hours of care under direct physician supervision.
Protocol For Photochemotherapy For Severe Photoresponsive Dermatoses: Petrolatum And Broad Band Uvb: The patient received Photochemotherapyfor severe photoresponsive dermatoses: Petrolatum and Broad Band UVB requiring at least 4 to 8 hours of care under direct physician supervision.
Protocol: Photochemotherapy: Mineral Oil and NBUVB
Protocol For Photochemotherapy: Baby Oil And Nbuvb: The patient received Photochemotherapy: Baby Oil and NBUVB (baby oil applied to all lesions prior to phototherapy).
Protocol For Photochemotherapy: Tar And Broad Band Uvb (Goeckerman Treatment): The patient received Photochemotherapy: Tar and Broad Band UVB (Goeckerman treatment).
Protocol For Nbuvb: The patient received NBUVB.
Detail Level: Zone
Consent: Written consent obtained.  The risks were reviewed with the patient including but not limited to: burn, pigmentary changes, pain, blistering, scabbing, redness, increased risk of skin cancers, and the remote possibility of scarring.
Protocol For Nb Uva: The patient received NB UVA.
Protocol For Puva: The patient received PUVA.
Protocol For Photochemotherapy: Mineral Oil And Nbuvb: The patient received Photochemotherapy: Mineral Oil and NBUVB (mineral oil applied to all lesions prior to phototherapy).
Protocol For Photochemotherapy: Petrolatum And Broad Band Uvb: The patient received Photochemotherapy: Petrolatum and Broad Band UVB.
Render Post-Care In The Note: no
Protocol For Uva: The patient received UVA.
Protocol For Photochemotherapy For Severe Photoresponsive Dermatoses: Puva: The patient received Photochemotherapy for severe photoresponsive dermatoses: PUVA requiring at least 4 to 8 hours of care under direct physician supervision.
Treatment Number: 1
Protocol For Photochemotherapy: Mineral Oil And Broad Band Uvb: The patient received Photochemotherapy: Mineral Oil and Broad Band UVB.
Protocol For Photochemotherapy: Tar And Nbuvb (Goeckerman Treatment): The patient received Photochemotherapy: Tar and NBUVB (Goeckerman treatment).
Name Of Supervising Technician: MICHAEL Dodson
Protocol For Photochemotherapy: Triamcinolone Ointment And Nbuvb: The patient received Photochemotherapy: Triamcinolone and NBUVB (triamcinolone ointment applied to all lesions prior to phototherapy).

## 2024-12-12 NOTE — PROCEDURE: TREATMENT REGIMEN
Detail Level: Zone
Plan: 12/12/2024- Eczema is inadequately controlled. Patient was unable to start Dupixent due to not being able to get rx at an affordable price, patient recently applicant for patient assistance but is still waiting to hear back. Discussed starting UVB light treatment to entire body three times a week. Patient reports betamethasone cream has been more effective but does not clear rash. Samples of Opzelura given today. Start Hydroxyzine 25mg nightly. Start prednisone taper\\n\\nWeeping eczematous plaques lower leg, calf, abdomen all nearly clear at this point with residual post inflammatory erythema.  Now with new dermatitis progressive on arms, chest, back with dermal edematous papules and small plaques that are extremely itchy.  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  Pt also with h/o asthma, so baseline dermatitis likely atopic derm.  However DDX for new dermatitis would be medication reaction (no new medicines, but increased dose of metoprolol).  Would include cocci in DDX as well, will get CXR, cocci serology and bullous pemphigoid also in DDX.  Will call with path results and DIF.  Start prednisone 30mg x 5 days, 20mg x 5 days, 10mg until know results.\\n\\n9/24. Both eczema and new onset BP flaring off of oral steroids.  Will start PA for dupixent minimum of 40% BSA at this time.  Dupixent has been shown to be effective in treating bullous pemphigoid as well, so can treat both with one medicine.

## 2024-12-16 ENCOUNTER — APPOINTMENT (OUTPATIENT)
Dept: URBAN - METROPOLITAN AREA CLINIC 158 | Facility: CLINIC | Age: 69
Setting detail: DERMATOLOGY
End: 2024-12-16

## 2024-12-16 DIAGNOSIS — L20.89 OTHER ATOPIC DERMATITIS: ICD-10-CM

## 2024-12-16 PROCEDURE — 96910 PHOTCHMTX TAR&UVB/PTRLTM&UVB: CPT

## 2024-12-16 PROCEDURE — ? PHOTOTHERAPY TREATMENT

## 2024-12-16 NOTE — PROCEDURE: PHOTOTHERAPY TREATMENT
Protocol For Bath Puva: The patient received Bath PUVA.
Protocol For Uva1: The patient received UVA1.
Protocol For Photochemotherapy For Severe Photoresponsive Dermatoses: Tar And Nbuvb (Goeckerman Treatment): The patient received Photochemotherapy for severe photoresponsive dermatoses: Tar and NBUVB (Goeckerman treatment) requiring at least 4 to 8 hours of care under direct physician supervision.
Post-Care Instructions: I reviewed with the patient in detail post-care instructions. Patient is to wear sun protection. Patients may expect sunburn like redness, discomfort and scabbing.
Protocol For Photochemotherapy For Severe Photoresponsive Dermatoses: Tar And Broad Band Uvb (Goeckerman Treatment): The patient received Photochemotherapy for severe photoresponsive dermatoses: Tar and Broad Band UVB (Goeckerman treatment) requiring at least 4 to 8 hours of care under direct physician supervision.
Protocol For Photochemotherapy: Petrolatum And Nbuvb: The patient received Photochemotherapy: Petrolatum and NBUVB (petrolatum applied to all lesions prior to phototherapy).
Total Body Time: 51 Seconds
Protocol For Broad Band Uvb: The patient received Broad Band UVB.
Protocol For Protocol For Photochemotherapy For Severe Photoresponsive Dermatoses: Bath Puva: The patient received Photochemotherapy for severe photoresponsive dermatoses: Bath PUVA requiring at least 4 to 8 hours of care under direct physician supervision.
Protocol For Photochemotherapy For Severe Photoresponsive Dermatoses: Petrolatum And Nbuvb: The patient received Photochemotherapy for severe photoresponsive dermatoses: Petrolatum and NBUVB requiring at least 4 to 8 hours of care under direct physician supervision.
Protocol For Photochemotherapy For Severe Photoresponsive Dermatoses: Petrolatum And Broad Band Uvb: The patient received Photochemotherapyfor severe photoresponsive dermatoses: Petrolatum and Broad Band UVB requiring at least 4 to 8 hours of care under direct physician supervision.
Protocol: Photochemotherapy: Mineral Oil and NBUVB
Protocol For Photochemotherapy: Baby Oil And Nbuvb: The patient received Photochemotherapy: Baby Oil and NBUVB (baby oil applied to all lesions prior to phototherapy).
Protocol For Photochemotherapy: Tar And Broad Band Uvb (Goeckerman Treatment): The patient received Photochemotherapy: Tar and Broad Band UVB (Goeckerman treatment).
Protocol For Nbuvb: The patient received NBUVB.
Detail Level: Zone
Consent: Written consent obtained.  The risks were reviewed with the patient including but not limited to: burn, pigmentary changes, pain, blistering, scabbing, redness, increased risk of skin cancers, and the remote possibility of scarring.
Protocol For Nb Uva: The patient received NB UVA.
Protocol For Puva: The patient received PUVA.
Protocol For Photochemotherapy: Mineral Oil And Nbuvb: The patient received Photochemotherapy: Mineral Oil and NBUVB (mineral oil applied to all lesions prior to phototherapy).
Protocol For Photochemotherapy: Petrolatum And Broad Band Uvb: The patient received Photochemotherapy: Petrolatum and Broad Band UVB.
Render Post-Care In The Note: no
Protocol For Uva: The patient received UVA.
Protocol For Photochemotherapy For Severe Photoresponsive Dermatoses: Puva: The patient received Photochemotherapy for severe photoresponsive dermatoses: PUVA requiring at least 4 to 8 hours of care under direct physician supervision.
Treatment Number: 2
Protocol For Photochemotherapy: Mineral Oil And Broad Band Uvb: The patient received Photochemotherapy: Mineral Oil and Broad Band UVB.
Protocol For Photochemotherapy: Tar And Nbuvb (Goeckerman Treatment): The patient received Photochemotherapy: Tar and NBUVB (Goeckerman treatment).
Name Of Supervising Technician: MICHAEL Dodson
Protocol For Photochemotherapy: Triamcinolone Ointment And Nbuvb: The patient received Photochemotherapy: Triamcinolone and NBUVB (triamcinolone ointment applied to all lesions prior to phototherapy).

## 2024-12-18 ENCOUNTER — APPOINTMENT (OUTPATIENT)
Dept: URBAN - METROPOLITAN AREA CLINIC 158 | Facility: CLINIC | Age: 69
Setting detail: DERMATOLOGY
End: 2024-12-18

## 2024-12-18 DIAGNOSIS — L12.0 BULLOUS PEMPHIGOID: ICD-10-CM

## 2024-12-18 DIAGNOSIS — L20.89 OTHER ATOPIC DERMATITIS: ICD-10-CM

## 2024-12-18 PROCEDURE — ? COUNSELING

## 2024-12-18 PROCEDURE — 96910 PHOTCHMTX TAR&UVB/PTRLTM&UVB: CPT

## 2024-12-18 PROCEDURE — 96372 THER/PROPH/DIAG INJ SC/IM: CPT

## 2024-12-18 PROCEDURE — ? PHOTOTHERAPY TREATMENT

## 2024-12-18 PROCEDURE — ? TREATMENT REGIMEN

## 2024-12-18 PROCEDURE — ? DUPIXENT INJECTION

## 2024-12-18 ASSESSMENT — LOCATION SIMPLE DESCRIPTION DERM
LOCATION SIMPLE: LEFT THIGH
LOCATION SIMPLE: RIGHT THIGH

## 2024-12-18 ASSESSMENT — LOCATION ZONE DERM: LOCATION ZONE: LEG

## 2024-12-18 ASSESSMENT — LOCATION DETAILED DESCRIPTION DERM
LOCATION DETAILED: RIGHT ANTERIOR PROXIMAL THIGH
LOCATION DETAILED: LEFT ANTERIOR PROXIMAL THIGH

## 2024-12-18 NOTE — PROCEDURE: DUPIXENT INJECTION
Render If Medication Purchased By Clinic In Visit Note?: Yes
Syringe Size Used (Required For Enhanced Ndc): 300 mg/2ml prefilled pen
Expiration Date (Optional): 2026-07-31
Was The Medication Purchased By The Clinic?: No
Treatment Number (Optional): 1
Detail Level: None
Ndc (200 Mg Prefilled Pen): 5095-9842-70
J-Code: 
Ndc (200 Mg Prefilled Syringe): 2055-3598-15
Ndc (300 Mg Prefilled Pen): 3766-5839-92
Ndc (300 Mg Prefilled Syringe): 7766-3073-72
Lot # (Optional): [id number]
Dupixent Dosing: 600 mg
Consent: The risks of pain and injection site reactions were reviewed with the patient prior to the injection.

## 2024-12-18 NOTE — PROCEDURE: PHOTOTHERAPY TREATMENT
Protocol For Bath Puva: The patient received Bath PUVA.
Protocol For Uva1: The patient received UVA1.
Protocol For Photochemotherapy For Severe Photoresponsive Dermatoses: Tar And Nbuvb (Goeckerman Treatment): The patient received Photochemotherapy for severe photoresponsive dermatoses: Tar and NBUVB (Goeckerman treatment) requiring at least 4 to 8 hours of care under direct physician supervision.
Post-Care Instructions: I reviewed with the patient in detail post-care instructions. Patient is to wear sun protection. Patients may expect sunburn like redness, discomfort and scabbing.
Protocol For Photochemotherapy For Severe Photoresponsive Dermatoses: Tar And Broad Band Uvb (Goeckerman Treatment): The patient received Photochemotherapy for severe photoresponsive dermatoses: Tar and Broad Band UVB (Goeckerman treatment) requiring at least 4 to 8 hours of care under direct physician supervision.
Protocol For Photochemotherapy: Petrolatum And Nbuvb: The patient received Photochemotherapy: Petrolatum and NBUVB (petrolatum applied to all lesions prior to phototherapy).
Total Body Time: 1:04
Protocol For Broad Band Uvb: The patient received Broad Band UVB.
Protocol For Protocol For Photochemotherapy For Severe Photoresponsive Dermatoses: Bath Puva: The patient received Photochemotherapy for severe photoresponsive dermatoses: Bath PUVA requiring at least 4 to 8 hours of care under direct physician supervision.
Protocol For Photochemotherapy For Severe Photoresponsive Dermatoses: Petrolatum And Nbuvb: The patient received Photochemotherapy for severe photoresponsive dermatoses: Petrolatum and NBUVB requiring at least 4 to 8 hours of care under direct physician supervision.
Protocol For Photochemotherapy For Severe Photoresponsive Dermatoses: Petrolatum And Broad Band Uvb: The patient received Photochemotherapyfor severe photoresponsive dermatoses: Petrolatum and Broad Band UVB requiring at least 4 to 8 hours of care under direct physician supervision.
Protocol: Photochemotherapy: Mineral Oil and NBUVB
Protocol For Photochemotherapy: Baby Oil And Nbuvb: The patient received Photochemotherapy: Baby Oil and NBUVB (baby oil applied to all lesions prior to phototherapy).
Protocol For Photochemotherapy: Tar And Broad Band Uvb (Goeckerman Treatment): The patient received Photochemotherapy: Tar and Broad Band UVB (Goeckerman treatment).
Protocol For Nbuvb: The patient received NBUVB.
Detail Level: Zone
Consent: Written consent obtained.  The risks were reviewed with the patient including but not limited to: burn, pigmentary changes, pain, blistering, scabbing, redness, increased risk of skin cancers, and the remote possibility of scarring.
Protocol For Nb Uva: The patient received NB UVA.
Protocol For Puva: The patient received PUVA.
Protocol For Photochemotherapy: Mineral Oil And Nbuvb: The patient received Photochemotherapy: Mineral Oil and NBUVB (mineral oil applied to all lesions prior to phototherapy).
Protocol For Photochemotherapy: Petrolatum And Broad Band Uvb: The patient received Photochemotherapy: Petrolatum and Broad Band UVB.
Render Post-Care In The Note: no
Protocol For Uva: The patient received UVA.
Protocol For Photochemotherapy For Severe Photoresponsive Dermatoses: Puva: The patient received Photochemotherapy for severe photoresponsive dermatoses: PUVA requiring at least 4 to 8 hours of care under direct physician supervision.
Treatment Number: 3
Protocol For Photochemotherapy: Mineral Oil And Broad Band Uvb: The patient received Photochemotherapy: Mineral Oil and Broad Band UVB.
Protocol For Photochemotherapy: Tar And Nbuvb (Goeckerman Treatment): The patient received Photochemotherapy: Tar and NBUVB (Goeckerman treatment).
Name Of Supervising Technician: Leisa HOFFMAN
Protocol For Photochemotherapy: Triamcinolone Ointment And Nbuvb: The patient received Photochemotherapy: Triamcinolone and NBUVB (triamcinolone ointment applied to all lesions prior to phototherapy).

## 2024-12-18 NOTE — PROCEDURE: TREATMENT REGIMEN
Detail Level: Zone
Plan: injection training today, I injected right thigh and patient injected left thigh.  Stayed in clinic 15 minutes to make sure no allergic reaction.  f/u clinic 2-3 months.

## 2024-12-20 ENCOUNTER — APPOINTMENT (OUTPATIENT)
Dept: URBAN - METROPOLITAN AREA CLINIC 158 | Facility: CLINIC | Age: 69
Setting detail: DERMATOLOGY
End: 2024-12-20

## 2024-12-20 DIAGNOSIS — L20.89 OTHER ATOPIC DERMATITIS: ICD-10-CM

## 2024-12-20 PROCEDURE — ? PHOTOTHERAPY TREATMENT

## 2024-12-20 PROCEDURE — 96910 PHOTCHMTX TAR&UVB/PTRLTM&UVB: CPT

## 2024-12-20 NOTE — PROCEDURE: PHOTOTHERAPY TREATMENT
Protocol For Bath Puva: The patient received Bath PUVA.
Protocol For Uva1: The patient received UVA1.
Protocol For Photochemotherapy For Severe Photoresponsive Dermatoses: Tar And Nbuvb (Goeckerman Treatment): The patient received Photochemotherapy for severe photoresponsive dermatoses: Tar and NBUVB (Goeckerman treatment) requiring at least 4 to 8 hours of care under direct physician supervision.
Post-Care Instructions: I reviewed with the patient in detail post-care instructions. Patient is to wear sun protection. Patients may expect sunburn like redness, discomfort and scabbing.
Protocol For Photochemotherapy For Severe Photoresponsive Dermatoses: Tar And Broad Band Uvb (Goeckerman Treatment): The patient received Photochemotherapy for severe photoresponsive dermatoses: Tar and Broad Band UVB (Goeckerman treatment) requiring at least 4 to 8 hours of care under direct physician supervision.
Protocol For Photochemotherapy: Petrolatum And Nbuvb: The patient received Photochemotherapy: Petrolatum and NBUVB (petrolatum applied to all lesions prior to phototherapy).
Total Body Time: 1:17
Protocol For Broad Band Uvb: The patient received Broad Band UVB.
Protocol For Protocol For Photochemotherapy For Severe Photoresponsive Dermatoses: Bath Puva: The patient received Photochemotherapy for severe photoresponsive dermatoses: Bath PUVA requiring at least 4 to 8 hours of care under direct physician supervision.
Protocol For Photochemotherapy For Severe Photoresponsive Dermatoses: Petrolatum And Nbuvb: The patient received Photochemotherapy for severe photoresponsive dermatoses: Petrolatum and NBUVB requiring at least 4 to 8 hours of care under direct physician supervision.
Protocol For Photochemotherapy For Severe Photoresponsive Dermatoses: Petrolatum And Broad Band Uvb: The patient received Photochemotherapyfor severe photoresponsive dermatoses: Petrolatum and Broad Band UVB requiring at least 4 to 8 hours of care under direct physician supervision.
Protocol: Photochemotherapy: Mineral Oil and NBUVB
Protocol For Photochemotherapy: Baby Oil And Nbuvb: The patient received Photochemotherapy: Baby Oil and NBUVB (baby oil applied to all lesions prior to phototherapy).
Protocol For Photochemotherapy: Tar And Broad Band Uvb (Goeckerman Treatment): The patient received Photochemotherapy: Tar and Broad Band UVB (Goeckerman treatment).
Protocol For Nbuvb: The patient received NBUVB.
Detail Level: Zone
Consent: Written consent obtained.  The risks were reviewed with the patient including but not limited to: burn, pigmentary changes, pain, blistering, scabbing, redness, increased risk of skin cancers, and the remote possibility of scarring.
Protocol For Nb Uva: The patient received NB UVA.
Protocol For Puva: The patient received PUVA.
Protocol For Photochemotherapy: Mineral Oil And Nbuvb: The patient received Photochemotherapy: Mineral Oil and NBUVB (mineral oil applied to all lesions prior to phototherapy).
Protocol For Photochemotherapy: Petrolatum And Broad Band Uvb: The patient received Photochemotherapy: Petrolatum and Broad Band UVB.
Render Post-Care In The Note: no
Protocol For Uva: The patient received UVA.
Protocol For Photochemotherapy For Severe Photoresponsive Dermatoses: Puva: The patient received Photochemotherapy for severe photoresponsive dermatoses: PUVA requiring at least 4 to 8 hours of care under direct physician supervision.
Treatment Number: 4
Protocol For Photochemotherapy: Mineral Oil And Broad Band Uvb: The patient received Photochemotherapy: Mineral Oil and Broad Band UVB.
Protocol For Photochemotherapy: Tar And Nbuvb (Goeckerman Treatment): The patient received Photochemotherapy: Tar and NBUVB (Goeckerman treatment).
Name Of Supervising Technician: Ivory HOFFMAN
Protocol For Photochemotherapy: Triamcinolone Ointment And Nbuvb: The patient received Photochemotherapy: Triamcinolone and NBUVB (triamcinolone ointment applied to all lesions prior to phototherapy).

## 2025-01-06 ENCOUNTER — APPOINTMENT (OUTPATIENT)
Dept: URBAN - METROPOLITAN AREA CLINIC 158 | Facility: CLINIC | Age: 70
Setting detail: DERMATOLOGY
End: 2025-01-06

## 2025-01-06 DIAGNOSIS — L82.0 INFLAMED SEBORRHEIC KERATOSIS: ICD-10-CM

## 2025-01-06 DIAGNOSIS — L20.89 OTHER ATOPIC DERMATITIS: ICD-10-CM | Status: IMPROVED

## 2025-01-06 DIAGNOSIS — Z79.899 OTHER LONG TERM (CURRENT) DRUG THERAPY: ICD-10-CM

## 2025-01-06 PROBLEM — D48.5 NEOPLASM OF UNCERTAIN BEHAVIOR OF SKIN: Status: ACTIVE | Noted: 2025-01-06

## 2025-01-06 PROCEDURE — ? BIOPSY BY SHAVE METHOD

## 2025-01-06 PROCEDURE — 96910 PHOTCHMTX TAR&UVB/PTRLTM&UVB: CPT

## 2025-01-06 PROCEDURE — 11102 TANGNTL BX SKIN SINGLE LES: CPT

## 2025-01-06 PROCEDURE — ? PHOTOTHERAPY TREATMENT

## 2025-01-06 PROCEDURE — ? HIGH RISK MEDICATION MONITORING

## 2025-01-06 PROCEDURE — ? PRESCRIPTION

## 2025-01-06 PROCEDURE — ? COUNSELING

## 2025-01-06 PROCEDURE — ? TREATMENT REGIMEN

## 2025-01-06 PROCEDURE — 99212 OFFICE O/P EST SF 10 MIN: CPT | Mod: 25

## 2025-01-06 RX ORDER — HYDROXYZINE HYDROCHLORIDE 25 MG/1
1 TABLET, FILM COATED ORAL QHS
Qty: 30 | Refills: 1 | Status: ERX

## 2025-01-06 ASSESSMENT — LOCATION SIMPLE DESCRIPTION DERM
LOCATION SIMPLE: LEFT UPPER BACK
LOCATION SIMPLE: CHEST

## 2025-01-06 ASSESSMENT — BSA RASH: BSA RASH: 5

## 2025-01-06 ASSESSMENT — LOCATION DETAILED DESCRIPTION DERM
LOCATION DETAILED: LEFT LATERAL SUPERIOR CHEST
LOCATION DETAILED: LEFT SUPERIOR MEDIAL UPPER BACK

## 2025-01-06 ASSESSMENT — LOCATION ZONE DERM: LOCATION ZONE: TRUNK

## 2025-01-06 NOTE — PROCEDURE: TREATMENT REGIMEN
Detail Level: Zone
Plan: 01/06/25- BSA 5%. patient was approved for dupixent inj. Patient had noticed improvement with dupixent after 2 treatments. Will continue light treatment and topicals. Increase use of moisturizers. Keep follow up with  in Feb.\\n\\n12/12/2024- Eczema is inadequately controlled. Patient was unable to start Dupixent due to not being able to get rx at an affordable price, patient recently applicant for patient assistance but is still waiting to hear back. Discussed starting UVB light treatment to entire body three times a week. Patient reports betamethasone cream has been more effective but does not clear rash. Samples of Opzelura given today. Start Hydroxyzine 25mg nightly. Start prednisone taper\\n\\nWeeping eczematous plaques lower leg, calf, abdomen all nearly clear at this point with residual post inflammatory erythema.  Now with new dermatitis progressive on arms, chest, back with dermal edematous papules and small plaques that are extremely itchy.  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  Pt also with h/o asthma, so baseline dermatitis likely atopic derm.  However DDX for new dermatitis would be medication reaction (no new medicines, but increased dose of metoprolol).  Would include cocci in DDX as well, will get CXR, cocci serology and bullous pemphigoid also in DDX.  Will call with path results and DIF.  Start prednisone 30mg x 5 days, 20mg x 5 days, 10mg until know results.\\n\\n9/24. Both eczema and new onset BP flaring off of oral steroids.  Will start PA for dupixent minimum of 40% BSA at this time.  Dupixent has been shown to be effective in treating bullous pemphigoid as well, so can treat both with one medicine.

## 2025-01-06 NOTE — PROCEDURE: PHOTOTHERAPY TREATMENT
Protocol For Photochemotherapy: Tar And Nbuvb (Goeckerman Treatment): The patient received Photochemotherapy: Tar and NBUVB (Goeckerman treatment).
Protocol For Photochemotherapy: Mineral Oil And Broad Band Uvb: The patient received Photochemotherapy: Mineral Oil and Broad Band UVB.
Protocol For Uva1: The patient received UVA1.
Protocol: Photochemotherapy: Mineral Oil and NBUVB
Protocol For Uva: The patient received UVA.
Total Body Time: 1:17
Protocol For Photochemotherapy: Triamcinolone Ointment And Nbuvb: The patient received Photochemotherapy: Triamcinolone and NBUVB (triamcinolone ointment applied to all lesions prior to phototherapy).
Comments On Previous Treatment: Will hold at last treatment time and increase by 13 sec at next visit
Protocol For Photochemotherapy: Petrolatum And Nbuvb: The patient received Photochemotherapy: Petrolatum and NBUVB (petrolatum applied to all lesions prior to phototherapy).
Protocol For Broad Band Uvb: The patient received Broad Band UVB.
Protocol For Photochemotherapy For Severe Photoresponsive Dermatoses: Tar And Nbuvb (Goeckerman Treatment): The patient received Photochemotherapy for severe photoresponsive dermatoses: Tar and NBUVB (Goeckerman treatment) requiring at least 4 to 8 hours of care under direct physician supervision.
Protocol For Bath Puva: The patient received Bath PUVA.
Protocol For Photochemotherapy For Severe Photoresponsive Dermatoses: Tar And Broad Band Uvb (Goeckerman Treatment): The patient received Photochemotherapy for severe photoresponsive dermatoses: Tar and Broad Band UVB (Goeckerman treatment) requiring at least 4 to 8 hours of care under direct physician supervision.
Detail Level: Zone
Post-Care Instructions: I reviewed with the patient in detail post-care instructions. Patient is to wear sun protection. Patients may expect sunburn like redness, discomfort and scabbing.
Name Of Supervising Technician: Katherine COUCH MA
Protocol For Photochemotherapy: Tar And Broad Band Uvb (Goeckerman Treatment): The patient received Photochemotherapy: Tar and Broad Band UVB (Goeckerman treatment).
Consent: Written consent obtained.  The risks were reviewed with the patient including but not limited to: burn, pigmentary changes, pain, blistering, scabbing, redness, increased risk of skin cancers, and the remote possibility of scarring.
Protocol For Photochemotherapy: Baby Oil And Nbuvb: The patient received Photochemotherapy: Baby Oil and NBUVB (baby oil applied to all lesions prior to phototherapy).
Treatment Number: 5
Protocol For Photochemotherapy For Severe Photoresponsive Dermatoses: Petrolatum And Nbuvb: The patient received Photochemotherapy for severe photoresponsive dermatoses: Petrolatum and NBUVB requiring at least 4 to 8 hours of care under direct physician supervision.
Protocol For Protocol For Photochemotherapy For Severe Photoresponsive Dermatoses: Bath Puva: The patient received Photochemotherapy for severe photoresponsive dermatoses: Bath PUVA requiring at least 4 to 8 hours of care under direct physician supervision.
Protocol For Photochemotherapy For Severe Photoresponsive Dermatoses: Petrolatum And Broad Band Uvb: The patient received Photochemotherapyfor severe photoresponsive dermatoses: Petrolatum and Broad Band UVB requiring at least 4 to 8 hours of care under direct physician supervision.
Render Post-Care In The Note: no
Protocol For Photochemotherapy: Mineral Oil And Nbuvb: The patient received Photochemotherapy: Mineral Oil and NBUVB (mineral oil applied to all lesions prior to phototherapy).
Protocol For Photochemotherapy For Severe Photoresponsive Dermatoses: Puva: The patient received Photochemotherapy for severe photoresponsive dermatoses: PUVA requiring at least 4 to 8 hours of care under direct physician supervision.
Protocol For Nbuvb: Hands/Feet: The patient received NBUVB.
Protocol For Photochemotherapy: Petrolatum And Broad Band Uvb: The patient received Photochemotherapy: Petrolatum and Broad Band UVB.
Protocol For Puva: The patient received PUVA.
Protocol For Nb Uva: The patient received NB UVA.

## 2025-01-06 NOTE — PROCEDURE: HIGH RISK MEDICATION MONITORING
Use Enhanced Medication Counseling?: No
Glycopyrrolate Pregnancy And Lactation Text: This medication is Pregnancy Category B and is considered safe during pregnancy. It is unknown if it is excreted breast milk.
Quinolones Counseling:  I discussed with the patient the risks of fluoroquinolones including but not limited to GI upset, allergic reaction, drug rash, diarrhea, dizziness, photosensitivity, yeast infections, liver function test abnormalities, tendonitis/tendon rupture.
Olumiant Pregnancy And Lactation Text: Based on animal studies, Olumiant may cause embryo-fetal harm when administered to pregnant women.  The medication should not be used in pregnancy.  Breastfeeding is not recommended during treatment.
Rhofade Counseling: Rhofade is a topical medication which can decrease superficial blood flow where applied. Side effects are uncommon and include stinging, redness and allergic reactions.
Bexarotene Counseling:  I discussed with the patient the risks of bexarotene including but not limited to hair loss, dry lips/skin/eyes, liver abnormalities, hyperlipidemia, pancreatitis, depression/suicidal ideation, photosensitivity, drug rash/allergic reactions, hypothyroidism, anemia, leukopenia, infection, cataracts, and teratogenicity.  Patient understands that they will need regular blood tests to check lipid profile, liver function tests, white blood cell count, thyroid function tests and pregnancy test if applicable.
Litfulo Pregnancy And Lactation Text: There is insufficient data to evaluate whether or not Litfulo is safe to use during pregnancy.  Breastfeeding is not recommended during treatment.
Clindamycin Pregnancy And Lactation Text: This medication can be used in pregnancy if certain situations. Clindamycin is also present in breast milk.
Benzoyl Peroxide Counseling: Patient counseled that medicine may cause skin irritation and bleach clothing.  In the event of skin irritation, the patient was advised to reduce the amount of the drug applied or use it less frequently.   The patient verbalized understanding of the proper use and possible adverse effects of benzoyl peroxide.  All of the patient's questions and concerns were addressed.
Nemluvio Counseling: I discussed with the patient the risks of nemolizumab including but not limited to headache, gastrointestinal complaints, nasopharyngitis, musculoskeletal complaints, injection site reactions, and allergic reactions. The patient understands that monitoring is required and they must alert us or the primary physician if any side effects are noted.
Cosentyx Counseling:  I discussed with the patient the risks of Cosentyx including but not limited to worsening of Crohn's disease, immunosuppression, allergic reactions and infections.  The patient understands that monitoring is required including a PPD at baseline and must alert us or the primary physician if symptoms of infection or other concerning signs are noted.
Ivermectin Counseling:  Patient instructed to take medication on an empty stomach with a full glass of water.  Patient informed of potential adverse effects including but not limited to nausea, diarrhea, dizziness, itching, and swelling of the extremities or lymph nodes.  The patient verbalized understanding of the proper use and possible adverse effects of ivermectin.  All of the patient's questions and concerns were addressed.
Propranolol Pregnancy And Lactation Text: This medication is Pregnancy Category C and it isn't known if it is safe during pregnancy. It is excreted in breast milk.
Olanzapine Counseling- I discussed with the patient the common side effects of olanzapine including but are not limited to: lack of energy, dry mouth, increased appetite, sleepiness, tremor, constipation, dizziness, changes in behavior, or restlessness.  Explained that teenagers are more likely to experience headaches, abdominal pain, pain in the arms or legs, tiredness, and sleepiness.  Serious side effects include but are not limited: increased risk of death in elderly patients who are confused, have memory loss, or dementia-related psychosis; hyperglycemia; increased cholesterol and triglycerides; and weight gain.
Cyclosporine Counseling:  I discussed with the patient the risks of cyclosporine including but not limited to hypertension, gingival hyperplasia,myelosuppression, immunosuppression, liver damage, kidney damage, neurotoxicity, lymphoma, and serious infections. The patient understands that monitoring is required including baseline blood pressure, CBC, CMP, lipid panel and uric acid, and then 1-2 times monthly CMP and blood pressure.
Topical Clindamycin Pregnancy And Lactation Text: This medication is Pregnancy Category B and is considered safe during pregnancy. It is unknown if it is excreted in breast milk.
Odomzo Pregnancy And Lactation Text: This medication is Pregnancy Category X and is absolutely contraindicated during pregnancy. It is unknown if it is excreted in breast milk.
Cimetidine Counseling:  I discussed with the patient the risks of Cimetidine including but not limited to gynecomastia, headache, diarrhea, nausea, drowsiness, arrhythmias, pancreatitis, skin rashes, psychosis, bone marrow suppression and kidney toxicity.
Benzoyl Peroxide Pregnancy And Lactation Text: This medication is Pregnancy Category C. It is unknown if benzoyl peroxide is excreted in breast milk.
Nemluvio Pregnancy And Lactation Text: It is not known if Nemluvio causes fetal harm or is present in breast milk. Please proceed with caution if patients who are pregnant or breastfeeding.
Cosentyx Pregnancy And Lactation Text: This medication is Pregnancy Category B and is considered safe during pregnancy. It is unknown if this medication is excreted in breast milk.
SSKI Counseling:  I discussed with the patient the risks of SSKI including but not limited to thyroid abnormalities, metallic taste, GI upset, fever, headache, acne, arthralgias, paraesthesias, lymphadenopathy, easy bleeding, arrhythmias, and allergic reaction.
Spironolactone Counseling: Patient advised regarding risks of diarrhea, abdominal pain, hyperkalemia, birth defects (for female patients), liver toxicity and renal toxicity. The patient may need blood work to monitor liver and kidney function and potassium levels while on therapy. The patient verbalized understanding of the proper use and possible adverse effects of spironolactone.  All of the patient's questions and concerns were addressed.
Clofazimine Counseling:  I discussed with the patient the risks of clofazimine including but not limited to skin and eye pigmentation, liver damage, nausea/vomiting, gastrointestinal bleeding and allergy.
Minoxidil Pregnancy And Lactation Text: This medication has not been assigned a Pregnancy Risk Category but animal studies failed to show danger with the topical medication. It is unknown if the medication is excreted in breast milk.
Fluconazole Pregnancy And Lactation Text: This medication is Pregnancy Category C and it isn't know if it is safe during pregnancy. It is also excreted in breast milk.
Tremfya Pregnancy And Lactation Text: The risk during pregnancy and breastfeeding is uncertain with this medication.
Skyrizi Counseling: I discussed with the patient the risks of risankizumab-rzaa including but not limited to immunosuppression, and serious infections.  The patient understands that monitoring is required including a PPD at baseline and must alert us or the primary physician if symptoms of infection or other concerning signs are noted.
Elidel Counseling: Patient may experience a mild burning sensation during topical application. Elidel is not approved in children less than 2 years of age. There have been case reports of hematologic and skin malignancies in patients using topical calcineurin inhibitors although causality is questionable.
Olumiant Counseling: I discussed with the patient the risks of Olumiant therapy including but not limited to upper respiratory tract infections, shingles, cold sores, and nausea. Live vaccines should be avoided.  This medication has been linked to serious infections; higher rate of mortality; malignancy and lymphoproliferative disorders; major adverse cardiovascular events; thrombosis; gastrointestinal perforations; neutropenia; lymphopenia; anemia; liver enzyme elevations; and lipid elevations.
Rinvoq Counseling: I discussed with the patient the risks of Rinvoq therapy including but not limited to upper respiratory tract infections, shingles, cold sores, bronchitis, nausea, cough, fever, acne, and headache. Live vaccines should be avoided.  This medication has been linked to serious infections; higher rate of mortality; malignancy and lymphoproliferative disorders; major adverse cardiovascular events; thrombosis; thrombocytopenia, anemia, and neutropenia; lipid elevations; liver enzyme elevations; and gastrointestinal perforations.
Clofazimine Pregnancy And Lactation Text: This medication is Pregnancy Category C and isn't considered safe during pregnancy. It is excreted in breast milk.
Mirvaso Counseling: Mirvaso is a topical medication which can decrease superficial blood flow where applied. Side effects are uncommon and include stinging, redness and allergic reactions.
Griseofulvin Counseling:  I discussed with the patient the risks of griseofulvin including but not limited to photosensitivity, cytopenia, liver damage, nausea/vomiting and severe allergy.  The patient understands that this medication is best absorbed when taken with a fatty meal (e.g., ice cream or french fries).
Bexarotene Pregnancy And Lactation Text: This medication is Pregnancy Category X and should not be given to women who are pregnant or may become pregnant. This medication should not be used if you are breast feeding.
Dutasteride Male Counseling: Dutasteride Counseling:  I discussed with the patient the risks of use of dutasteride including but not limited to decreased libido, decreased ejaculate volume, and gynecomastia. Women who can become pregnant should not handle medication.  All of the patient's questions and concerns were addressed.
Spironolactone Pregnancy And Lactation Text: This medication can cause feminization of the male fetus and should be avoided during pregnancy. The active metabolite is also found in breast milk.
Doxycycline Counseling:  Patient counseled regarding possible photosensitivity and increased risk for sunburn.  Patient instructed to avoid sunlight, if possible.  When exposed to sunlight, patients should wear protective clothing, sunglasses, and sunscreen.  The patient was instructed to call the office immediately if the following severe adverse effects occur:  hearing changes, easy bruising/bleeding, severe headache, or vision changes.  The patient verbalized understanding of the proper use and possible adverse effects of doxycycline.  All of the patient's questions and concerns were addressed.
Topical Ketoconazole Counseling: Patient counseled that this medication may cause skin irritation or allergic reactions.  In the event of skin irritation, the patient was advised to reduce the amount of the drug applied or use it less frequently.   The patient verbalized understanding of the proper use and possible adverse effects of ketoconazole.  All of the patient's questions and concerns were addressed.
Ivermectin Pregnancy And Lactation Text: This medication is Pregnancy Category C and it isn't known if it is safe during pregnancy. It is also excreted in breast milk.
Cyclosporine Pregnancy And Lactation Text: This medication is Pregnancy Category C and it isn't know if it is safe during pregnancy. This medication is excreted in breast milk.
Olanzapine Pregnancy And Lactation Text: This medication is pregnancy category C.   There are no adequate and well controlled trials with olanzapine in pregnant females.  Olanzapine should be used during pregnancy only if the potential benefit justifies the potential risk to the fetus.   In a study in lactating healthy women, olanzapine was excreted in breast milk.  It is recommended that women taking olanzapine should not breast feed.
Hydroxychloroquine Counseling:  I discussed with the patient that a baseline ophthalmologic exam is needed at the start of therapy and every year thereafter while on therapy. A CBC may also be warranted for monitoring.  The side effects of this medication were discussed with the patient, including but not limited to agranulocytosis, aplastic anemia, seizures, rashes, retinopathy, and liver toxicity. Patient instructed to call the office should any adverse effect occur.  The patient verbalized understanding of the proper use and possible adverse effects of Plaquenil.  All the patient's questions and concerns were addressed.
Rhofade Pregnancy And Lactation Text: This medication has not been assigned a Pregnancy Risk Category. It is unknown if the medication is excreted in breast milk.
Dupixent Counseling: I discussed with the patient the risks of dupilumab including but not limited to eye infection and irritation, cold sores, injection site reactions, worsening of asthma, allergic reactions and increased risk of parasitic infection.  Live vaccines should be avoided while taking dupilumab. Dupilumab will also interact with certain medications such as warfarin and cyclosporine. The patient understands that monitoring is required and they must alert us or the primary physician if symptoms of infection or other concerning signs are noted.
Oral Minoxidil Counseling- I discussed with the patient the risks of oral minoxidil including but not limited to shortness of breath, swelling of the feet or ankles, dizziness, lightheadedness, unwanted hair growth and allergic reaction.  The patient verbalized understanding of the proper use and possible adverse effects of oral minoxidil.  All of the patient's questions and concerns were addressed.
Methotrexate Counseling:  Patient counseled regarding adverse effects of methotrexate including but not limited to nausea, vomiting, abnormalities in liver function tests. Patients may develop mouth sores, rash, diarrhea, and abnormalities in blood counts. The patient understands that monitoring is required including LFT's and blood counts.  There is a rare possibility of scarring of the liver and lung problems that can occur when taking methotrexate. Persistent nausea, loss of appetite, pale stools, dark urine, cough, and shortness of breath should be reported immediately. Patient advised to discontinue methotrexate treatment at least three months before attempting to become pregnant.  I discussed the need for folate supplements while taking methotrexate.  These supplements can decrease side effects during methotrexate treatment. The patient verbalized understanding of the proper use and possible adverse effects of methotrexate.  All of the patient's questions and concerns were addressed.
Rinvoq Pregnancy And Lactation Text: Based on animal studies, Rinvoq may cause embryo-fetal harm when administered to pregnant women.  The medication should not be used in pregnancy.  Breastfeeding is not recommended during treatment and for 6 days after the last dose.
Elidel Pregnancy And Lactation Text: This medication is Pregnancy Category C. It is unknown if this medication is excreted in breast milk.
Xolair Counseling:  Patient informed of potential adverse effects including but not limited to fever, muscle aches, rash and allergic reactions.  The patient verbalized understanding of the proper use and possible adverse effects of Xolair.  All of the patient's questions and concerns were addressed.
Sski Pregnancy And Lactation Text: This medication is Pregnancy Category D and isn't considered safe during pregnancy. It is excreted in breast milk.
Rituxan Counseling:  I discussed with the patient the risks of Rituxan infusions. Side effects can include infusion reactions, severe drug rashes including mucocutaneous reactions, reactivation of latent hepatitis and other infections and rarely progressive multifocal leukoencephalopathy.  All of the patient's questions and concerns were addressed.
Carac Counseling:  I discussed with the patient the risks of Carac including but not limited to erythema, scaling, itching, weeping, crusting, and pain.
Cimetidine Pregnancy And Lactation Text: This medication is Pregnancy Category B and is considered safe during pregnancy. It is also excreted in breast milk and breast feeding isn't recommended.
Isotretinoin Counseling: Patient should get monthly blood tests, not donate blood, not drive at night if vision affected, not share medication, and not undergo elective surgery for 6 months after tx completed. Side effects reviewed, pt to contact office should one occur.
Doxycycline Pregnancy And Lactation Text: This medication is Pregnancy Category D and not consider safe during pregnancy. It is also excreted in breast milk but is considered safe for shorter treatment courses.
Eucrisa Counseling: Patient may experience a mild burning sensation during topical application. Eucrisa is not approved in children less than 2 years of age.
Griseofulvin Pregnancy And Lactation Text: This medication is Pregnancy Category X and is known to cause serious birth defects. It is unknown if this medication is excreted in breast milk but breast feeding should be avoided.
Xolair Pregnancy And Lactation Text: This medication is Pregnancy Category B and is considered safe during pregnancy. This medication is excreted in breast milk.
Rituxan Pregnancy And Lactation Text: This medication is Pregnancy Category C and it isn't know if it is safe during pregnancy. It is unknown if this medication is excreted in breast milk but similar antibodies are known to be excreted.
Dutasteride Female Counseling: Dutasteride Counseling:  I discussed with the patient the risks of use of dutasteride including but not limited to decreased libido and sexual dysfunction. Explained the teratogenic nature of the medication and stressed the importance of not getting pregnant during treatment. All of the patient's questions and concerns were addressed.
Doxepin Counseling:  Patient advised that the medication is sedating and not to drive a car after taking this medication. Patient informed of potential adverse effects including but not limited to dry mouth, urinary retention, and blurry vision.  The patient verbalized understanding of the proper use and possible adverse effects of doxepin.  All of the patient's questions and concerns were addressed.
Solaraze Counseling:  I discussed with the patient the risks of Solaraze including but not limited to erythema, scaling, itching, weeping, crusting, and pain.
Hydroxychloroquine Pregnancy And Lactation Text: This medication has been shown to cause fetal harm but it isn't assigned a Pregnancy Risk Category. There are small amounts excreted in breast milk.
Colchicine Counseling:  Patient counseled regarding adverse effects including but not limited to stomach upset (nausea, vomiting, stomach pain, or diarrhea).  Patient instructed to limit alcohol consumption while taking this medication.  Colchicine may reduce blood counts especially with prolonged use.  The patient understands that monitoring of kidney function and blood counts may be required, especially at baseline. The patient verbalized understanding of the proper use and possible adverse effects of colchicine.  All of the patient's questions and concerns were addressed.
Rifampin Counseling: I discussed with the patient the risks of rifampin including but not limited to liver damage, kidney damage, red-orange body fluids, nausea/vomiting and severe allergy.
Zyclara Counseling:  I discussed with the patient the risks of imiquimod including but not limited to erythema, scaling, itching, weeping, crusting, and pain.  Patient understands that the inflammatory response to imiquimod is variable from person to person and was educated regarded proper titration schedule.  If flu-like symptoms develop, patient knows to discontinue the medication and contact us.
Azithromycin Counseling:  I discussed with the patient the risks of azithromycin including but not limited to GI upset, allergic reaction, drug rash, diarrhea, and yeast infections.
Azathioprine Counseling:  I discussed with the patient the risks of azathioprine including but not limited to myelosuppression, immunosuppression, hepatotoxicity, lymphoma, and infections.  The patient understands that monitoring is required including baseline LFTs, Creatinine, possible TPMP genotyping and weekly CBCs for the first month and then every 2 weeks thereafter.  The patient verbalized understanding of the proper use and possible adverse effects of azathioprine.  All of the patient's questions and concerns were addressed.
Low Dose Naltrexone Counseling- I discussed with the patient the potential risks and side effects of low dose naltrexone including but not limited to: more vivid dreams, headaches, nausea, vomiting, abdominal pain, fatigue, dizziness, and anxiety.
Topical Sulfur Applications Counseling: Topical Sulfur Counseling: Patient counseled that this medication may cause skin irritation or allergic reactions.  In the event of skin irritation, the patient was advised to reduce the amount of the drug applied or use it less frequently.   The patient verbalized understanding of the proper use and possible adverse effects of topical sulfur application.  All of the patient's questions and concerns were addressed.
Rifampin Pregnancy And Lactation Text: This medication is Pregnancy Category C and it isn't know if it is safe during pregnancy. It is also excreted in breast milk and should not be used if you are breast feeding.
Carac Pregnancy And Lactation Text: This medication is Pregnancy Category X and contraindicated in pregnancy and in women who may become pregnant. It is unknown if this medication is excreted in breast milk.
Spevigo Counseling: I discussed with the patient the risks of Spevigo including but not limited to fatigue, nasuea, vomiting, headache, pruritus, urinary tract infection, an infusion related reactions.  The patient understands that monitoring is required including screening for tuberculosis at baseline and yearly screening thereafter while continuing Spevigo therapy. They should contact us if symptoms of infection or other concerning signs are noted.
Dupixent Pregnancy And Lactation Text: This medication likely crosses the placenta but the risk for the fetus is uncertain. This medication is excreted in breast milk.
Methotrexate Pregnancy And Lactation Text: This medication is Pregnancy Category X and is known to cause fetal harm. This medication is excreted in breast milk.
Oral Minoxidil Pregnancy And Lactation Text: This medication should only be used when clearly needed if you are pregnant, attempting to become pregnant or breast feeding.
Hyrimoz Counseling:  I discussed with the patient the risks of adalimumab including but not limited to myelosuppression, immunosuppression, autoimmune hepatitis, demyelinating diseases, lymphoma, and serious infections.  The patient understands that monitoring is required including a PPD at baseline and must alert us or the primary physician if symptoms of infection or other concerning signs are noted.
Sotyktu Counseling:  I discussed the most common side effects of Sotyktu including: common cold, sore throat, sinus infections, cold sores, canker sores, folliculitis, and acne.  I also discussed more serious side effects of Sotyktu including but not limited to: serious allergic reactions; increased risk for infections such as TB; cancers such as lymphomas; rhabdomyolysis and elevated CPK; and elevated triglycerides and liver enzymes. 
Thalidomide Counseling: I discussed with the patient the risks of thalidomide including but not limited to birth defects, anxiety, weakness, chest pain, dizziness, cough and severe allergy.
Adbry Counseling: I discussed with the patient the risks of tralokinumab including but not limited to eye infection and irritation, cold sores, injection site reactions, worsening of asthma, allergic reactions and increased risk of parasitic infection.  Live vaccines should be avoided while taking tralokinumab. The patient understands that monitoring is required and they must alert us or the primary physician if symptoms of infection or other concerning signs are noted.
Itraconazole Counseling:  I discussed with the patient the risks of itraconazole including but not limited to liver damage, nausea/vomiting, neuropathy, and severe allergy.  The patient understands that this medication is best absorbed when taken with acidic beverages such as non-diet cola or ginger ale.  The patient understands that monitoring is required including baseline LFTs and repeat LFTs at intervals.  The patient understands that they are to contact us or the primary physician if concerning signs are noted.
Spevigo Pregnancy And Lactation Text: The risk during pregnancy and breastfeeding is uncertain with this medication. This medication does cross the placenta. It is unknown if this medication is found in breast milk.
Calcipotriene Counseling:  I discussed with the patient the risks of calcipotriene including but not limited to erythema, scaling, itching, and irritation.
Doxepin Pregnancy And Lactation Text: This medication is Pregnancy Category C and it isn't known if it is safe during pregnancy. It is also excreted in breast milk and breast feeding isn't recommended.
Azithromycin Pregnancy And Lactation Text: This medication is considered safe during pregnancy and is also secreted in breast milk.
Ebglyss Counseling: I discussed with the patient the risks of lebrikizumab including but not limited to eye inflammation and irritation, cold sores, injection site reactions, allergic reactions and increased risk of parasitic infection. The patient understands that monitoring is required and they must alert us or the primary physician if symptoms of infection or other concerning signs are noted.
Opzelura Counseling:  I discussed with the patient the risks of Opzelura including but not limited to nasopharngitis, bronchitis, ear infection, eosinophila, hives, diarrhea, folliculitis, tonsillitis, and rhinorrhea.  Taken orally, this medication has been linked to serious infections; higher rate of mortality; malignancy and lymphoproliferative disorders; major adverse cardiovascular events; thrombosis; thrombocytopenia, anemia, and neutropenia; and lipid elevations.
Isotretinoin Pregnancy And Lactation Text: This medication is Pregnancy Category X and is considered extremely dangerous during pregnancy. It is unknown if it is excreted in breast milk.
Solaraze Pregnancy And Lactation Text: This medication is Pregnancy Category B and is considered safe. There is some data to suggest avoiding during the third trimester. It is unknown if this medication is excreted in breast milk.
Erythromycin Counseling:  I discussed with the patient the risks of erythromycin including but not limited to GI upset, allergic reaction, drug rash, diarrhea, increase in liver enzymes, and yeast infections.
Dutasteride Pregnancy And Lactation Text: This medication is absolutely contraindicated in women during pregnancy and breast feeding. Feminization of male fetuses is possible if taking while pregnant.
Adbry Pregnancy And Lactation Text: It is unknown if this medication will adversely affect pregnancy or breast feeding.
Sarecycline Counseling: Patient advised regarding possible photosensitivity and discoloration of the teeth, skin, lips, tongue and gums.  Patient instructed to avoid sunlight, if possible.  When exposed to sunlight, patients should wear protective clothing, sunglasses, and sunscreen.  The patient was instructed to call the office immediately if the following severe adverse effects occur:  hearing changes, easy bruising/bleeding, severe headache, or vision changes.  The patient verbalized understanding of the proper use and possible adverse effects of sarecycline.  All of the patient's questions and concerns were addressed.
Sotyktu Pregnancy And Lactation Text: There is insufficient data to evaluate whether or not Sotyktu is safe to use during pregnancy.   It is not known if Sotyktu passes into breast milk and whether or not it is safe to use when breastfeeding.  
Dapsone Counseling: I discussed with the patient the risks of dapsone including but not limited to hemolytic anemia, agranulocytosis, rashes, methemoglobinemia, kidney failure, peripheral neuropathy, headaches, GI upset, and liver toxicity.  Patients who start dapsone require monitoring including baseline LFTs and weekly CBCs for the first month, then every month thereafter.  The patient verbalized understanding of the proper use and possible adverse effects of dapsone.  All of the patient's questions and concerns were addressed.
High Dose Vitamin A Counseling: Side effects reviewed, pt to contact office should one occur.
Topical Retinoid counseling:  Patient advised to apply a pea-sized amount only at bedtime and wait 30 minutes after washing their face before applying.  If too drying, patient may add a non-comedogenic moisturizer. The patient verbalized understanding of the proper use and possible adverse effects of retinoids.  All of the patient's questions and concerns were addressed.
Low Dose Naltrexone Pregnancy And Lactation Text: Naltrexone is pregnancy category C.  There have been no adequate and well-controlled studies in pregnant women.  It should be used in pregnancy only if the potential benefit justifies the potential risk to the fetus.   Limited data indicates that naltrexone is minimally excreted into breastmilk.
Siliq Counseling:  I discussed with the patient the risks of Siliq including but not limited to new or worsening depression, suicidal thoughts and behavior, immunosuppression, malignancy, posterior leukoencephalopathy syndrome, and serious infections.  The patient understands that monitoring is required including a PPD at baseline and must alert us or the primary physician if symptoms of infection or other concerning signs are noted. There is also a special program designed to monitor depression which is required with Siliq.
Erivedge Counseling- I discussed with the patient the risks of Erivedge including but not limited to nausea, vomiting, diarrhea, constipation, weight loss, changes in the sense of taste, decreased appetite, muscle spasms, and hair loss.  The patient verbalized understanding of the proper use and possible adverse effects of Erivedge.  All of the patient's questions and concerns were addressed.
Prednisone Counseling:  I discussed with the patient the risks of prolonged use of prednisone including but not limited to weight gain, insomnia, osteoporosis, mood changes, diabetes, susceptibility to infection, glaucoma and high blood pressure.  In cases where prednisone use is prolonged, patients should be monitored with blood pressure checks, serum glucose levels and an eye exam.  Additionally, the patient may need to be placed on GI prophylaxis, PCP prophylaxis, and calcium and vitamin D supplementation and/or a bisphosphonate.  The patient verbalized understanding of the proper use and the possible adverse effects of prednisone.  All of the patient's questions and concerns were addressed.
Azathioprine Pregnancy And Lactation Text: This medication is Pregnancy Category D and isn't considered safe during pregnancy. It is unknown if this medication is excreted in breast milk.
Otezla Counseling: The side effects of Otezla were discussed with the patient, including but not limited to worsening or new depression, weight loss, diarrhea, nausea, upper respiratory tract infection, and headache. Patient instructed to call the office should any adverse effect occur.  The patient verbalized understanding of the proper use and possible adverse effects of Otezla.  All the patient's questions and concerns were addressed.
Topical Sulfur Applications Pregnancy And Lactation Text: This medication is Pregnancy Category C and has an unknown safety profile during pregnancy. It is unknown if this topical medication is excreted in breast milk.
Hydroxyzine Counseling: Patient advised that the medication is sedating and not to drive a car after taking this medication.  Patient informed of potential adverse effects including but not limited to dry mouth, urinary retention, and blurry vision.  The patient verbalized understanding of the proper use and possible adverse effects of hydroxyzine.  All of the patient's questions and concerns were addressed.
Bactrim Counseling:  I discussed with the patient the risks of sulfa antibiotics including but not limited to GI upset, allergic reaction, drug rash, diarrhea, dizziness, photosensitivity, and yeast infections.  Rarely, more serious reactions can occur including but not limited to aplastic anemia, agranulocytosis, methemoglobinemia, blood dyscrasias, liver or kidney failure, lung infiltrates or desquamative/blistering drug rashes.
Opioid Counseling: I discussed with the patient the potential side effects of opioids including but not limited to addiction, altered mental status, and depression. I stressed avoiding alcohol, benzodiazepines, muscle relaxants and sleep aids unless specifically okayed by a physician. The patient verbalized understanding of the proper use and possible adverse effects of opioids. All of the patient's questions and concerns were addressed. They were instructed to flush the remaining pills down the toilet if they did not need them for pain.
Tranexamic Acid Counseling:  Patient advised of the small risk of bleeding problems with tranexamic acid. They were also instructed to call if they developed any nausea, vomiting or diarrhea. All of the patient's questions and concerns were addressed.
Xeljanz Counseling: I discussed with the patient the risks of Xeljanz therapy including increased risk of infection, liver issues, headache, diarrhea, or cold symptoms. Live vaccines should be avoided. They were instructed to call if they have any problems.
Finasteride Male Counseling: Finasteride Counseling:  I discussed with the patient the risks of use of finasteride including but not limited to decreased libido, decreased ejaculate volume, gynecomastia, and depression. Women should not handle medication.  All of the patient's questions and concerns were addressed.
Opzelura Pregnancy And Lactation Text: There is insufficient data to evaluate drug-associated risk for major birth defects, miscarriage, or other adverse maternal or fetal outcomes.  There is a pregnancy registry that monitors pregnancy outcomes in pregnant persons exposed to the medication during pregnancy.  It is unknown if this medication is excreted in breast milk.  Do not breastfeed during treatment and for about 4 weeks after the last dose.
Calcipotriene Pregnancy And Lactation Text: This medication has not been proven safe during pregnancy. It is unknown if this medication is excreted in breast milk.
Erythromycin Pregnancy And Lactation Text: This medication is Pregnancy Category B and is considered safe during pregnancy. It is also excreted in breast milk.
Hydroquinone Counseling:  Patient advised that medication may result in skin irritation, lightening (hypopigmentation), dryness, and burning.  In the event of skin irritation, the patient was advised to reduce the amount of the drug applied or use it less frequently.  Rarely, spots that are treated with hydroquinone can become darker (pseudoochronosis).  Should this occur, patient instructed to stop medication and call the office. The patient verbalized understanding of the proper use and possible adverse effects of hydroquinone.  All of the patient's questions and concerns were addressed.
Ebglyss Pregnancy And Lactation Text: This medication likely crosses the placenta but the risk for the fetus is uncertain. It is unknown if this medication is excreted in breast milk.
Stelara Counseling:  I discussed with the patient the risks of ustekinumab including but not limited to immunosuppression, malignancy, posterior leukoencephalopathy syndrome, and serious infections.  The patient understands that monitoring is required including a PPD at baseline and must alert us or the primary physician if symptoms of infection or other concerning signs are noted.
Dapsone Pregnancy And Lactation Text: This medication is Pregnancy Category C and is not considered safe during pregnancy or breast feeding.
Picato Counseling:  I discussed with the patient the risks of Picato including but not limited to erythema, scaling, itching, weeping, crusting, and pain.
Ketoconazole Counseling:   Patient counseled regarding improving absorption with orange juice.  Adverse effects include but are not limited to breast enlargement, headache, diarrhea, nausea, upset stomach, liver function test abnormalities, taste disturbance, and stomach pain.  There is a rare possibility of liver failure that can occur when taking ketoconazole. The patient understands that monitoring of LFTs may be required, especially at baseline. The patient verbalized understanding of the proper use and possible adverse effects of ketoconazole.  All of the patient's questions and concerns were addressed.
High Dose Vitamin A Pregnancy And Lactation Text: High dose vitamin A therapy is contraindicated during pregnancy and breast feeding.
Bactrim Pregnancy And Lactation Text: This medication is Pregnancy Category D and is known to cause fetal risk.  It is also excreted in breast milk.
Ilumya Counseling: I discussed with the patient the risks of tildrakizumab including but not limited to immunosuppression, malignancy, posterior leukoencephalopathy syndrome, and serious infections.  The patient understands that monitoring is required including a PPD at baseline and must alert us or the primary physician if symptoms of infection or other concerning signs are noted.
Bimzelx Counseling:  I discussed with the patient the risks of Bimzelx including but not limited to depression, immunosuppression, allergic reactions and infections.  The patient understands that monitoring is required including a PPD at baseline and must alert us or the primary physician if symptoms of infection or other concerning signs are noted.
Cellcept Counseling:  I discussed with the patient the risks of mycophenolate mofetil including but not limited to infection/immunosuppression, GI upset, hypokalemia, hypercholesterolemia, bone marrow suppression, lymphoproliferative disorders, malignancy, GI ulceration/bleed/perforation, colitis, interstitial lung disease, kidney failure, progressive multifocal leukoencephalopathy, and birth defects.  The patient understands that monitoring is required including a baseline creatinine and regular CBC testing. In addition, patient must alert us immediately if symptoms of infection or other concerning signs are noted.
Wartpeel Counseling:  I discussed with the patient the risks of Wartpeel including but not limited to erythema, scaling, itching, weeping, crusting, and pain.
Otezla Pregnancy And Lactation Text: This medication is Pregnancy Category C and it isn't known if it is safe during pregnancy. It is unknown if it is excreted in breast milk.
Sarecycline Pregnancy And Lactation Text: This medication is Pregnancy Category D and not consider safe during pregnancy. It is also excreted in breast milk.
Niacinamide Counseling: I recommended taking niacin or niacinamide, also know as vitamin B3, twice daily. Recent evidence suggests that taking vitamin B3 (500 mg twice daily) can reduce the risk of actinic keratoses and non-melanoma skin cancers. Side effects of vitamin B3 include flushing and headache.
Enbrel Counseling:  I discussed with the patient the risks of etanercept including but not limited to myelosuppression, immunosuppression, autoimmune hepatitis, demyelinating diseases, lymphoma, and infections.  The patient understands that monitoring is required including a PPD at baseline and must alert us or the primary physician if symptoms of infection or other concerning signs are noted.
Bimzelx Pregnancy And Lactation Text: This medication crosses the placenta and the safety is uncertain during pregnancy. It is unknown if this medication is present in breast milk.
Libtayo Counseling- I discussed with the patient the risks of Libtayo including but not limited to nausea, vomiting, diarrhea, and bone or muscle pain.  The patient verbalized understanding of the proper use and possible adverse effects of Libtayo.  All of the patient's questions and concerns were addressed.
Oxybutynin Counseling:  I discussed with the patient the risks of oxybutynin including but not limited to skin rash, drowsiness, dry mouth, difficulty urinating, and blurred vision.
Tetracycline Counseling: Patient counseled regarding possible photosensitivity and increased risk for sunburn.  Patient instructed to avoid sunlight, if possible.  When exposed to sunlight, patients should wear protective clothing, sunglasses, and sunscreen.  The patient was instructed to call the office immediately if the following severe adverse effects occur:  hearing changes, easy bruising/bleeding, severe headache, or vision changes.  The patient verbalized understanding of the proper use and possible adverse effects of tetracycline.  All of the patient's questions and concerns were addressed. Patient understands to avoid pregnancy while on therapy due to potential birth defects.
Metronidazole Counseling:  I discussed with the patient the risks of metronidazole including but not limited to seizures, nausea/vomiting, a metallic taste in the mouth, nausea/vomiting and severe allergy.
Finasteride Female Counseling: Finasteride Counseling:  I discussed with the patient the risks of use of finasteride including but not limited to decreased libido and sexual dysfunction. Explained the teratogenic nature of the medication and stressed the importance of not getting pregnant during treatment. All of the patient's questions and concerns were addressed.
Simlandi Counseling:  I discussed with the patient the risks of adalimumab including but not limited to myelosuppression, immunosuppression, autoimmune hepatitis, demyelinating diseases, lymphoma, and serious infections.  The patient understands that monitoring is required including a PPD at baseline and must alert us or the primary physician if symptoms of infection or other concerning signs are noted.
Tranexamic Acid Pregnancy And Lactation Text: It is unknown if this medication is safe during pregnancy or breast feeding.
Hydroxyzine Pregnancy And Lactation Text: This medication is not safe during pregnancy and should not be taken. It is also excreted in breast milk and breast feeding isn't recommended.
Opioid Pregnancy And Lactation Text: These medications can lead to premature delivery and should be avoided during pregnancy. These medications are also present in breast milk in small amounts.
5-Fu Counseling: 5-Fluorouracil Counseling:  I discussed with the patient the risks of 5-fluorouracil including but not limited to erythema, scaling, itching, weeping, crusting, and pain.
Metronidazole Pregnancy And Lactation Text: This medication is Pregnancy Category B and considered safe during pregnancy.  It is also excreted in breast milk.
Cibinqo Counseling: I discussed with the patient the risks of Cibinqo therapy including but not limited to common cold, nausea, headache, cold sores, increased blood CPK levels, dizziness, UTIs, fatigue, acne, and vomitting. Live vaccines should be avoided.  This medication has been linked to serious infections; higher rate of mortality; malignancy and lymphoproliferative disorders; major adverse cardiovascular events; thrombosis; thrombocytopenia and lymphopenia; lipid elevations; and retinal detachment.
Imiquimod Counseling:  I discussed with the patient the risks of imiquimod including but not limited to erythema, scaling, itching, weeping, crusting, and pain.  Patient understands that the inflammatory response to imiquimod is variable from person to person and was educated regarded proper titration schedule.  If flu-like symptoms develop, patient knows to discontinue the medication and contact us.
Finasteride Pregnancy And Lactation Text: This medication is absolutely contraindicated during pregnancy. It is unknown if it is excreted in breast milk.
Xelmaddisonz Pregnancy And Lactation Text: This medication is Pregnancy Category D and is not considered safe during pregnancy.  The risk during breast feeding is also uncertain.
Tazorac Counseling:  Patient advised that medication is irritating and drying.  Patient may need to apply sparingly and wash off after an hour before eventually leaving it on overnight.  The patient verbalized understanding of the proper use and possible adverse effects of tazorac.  All of the patient's questions and concerns were addressed.
Niacinamide Pregnancy And Lactation Text: These medications are considered safe during pregnancy.
Cephalexin Counseling: I counseled the patient regarding use of cephalexin as an antibiotic for prophylactic and/or therapeutic purposes. Cephalexin (commonly prescribed under brand name Keflex) is a cephalosporin antibiotic which is active against numerous classes of bacteria, including most skin bacteria. Side effects may include nausea, diarrhea, gastrointestinal upset, rash, hives, yeast infections, and in rare cases, hepatitis, kidney disease, seizures, fever, confusion, neurologic symptoms, and others. Patients with severe allergies to penicillin medications are cautioned that there is about a 10% incidence of cross-reactivity with cephalosporins. When possible, patients with penicillin allergies should use alternatives to cephalosporins for antibiotic therapy.
Ketoconazole Pregnancy And Lactation Text: This medication is Pregnancy Category C and it isn't know if it is safe during pregnancy. It is also excreted in breast milk and breast feeding isn't recommended.
Gabapentin Counseling: I discussed with the patient the risks of gabapentin including but not limited to dizziness, somnolence, fatigue and ataxia.
Cimzia Counseling:  I discussed with the patient the risks of Cimzia including but not limited to immunosuppression, allergic reactions and infections.  The patient understands that monitoring is required including a PPD at baseline and must alert us or the primary physician if symptoms of infection or other concerning signs are noted.
Nsaids Counseling: NSAID Counseling: I discussed with the patient that NSAIDs should be taken with food. Prolonged use of NSAIDs can result in the development of stomach ulcers.  Patient advised to stop taking NSAIDs if abdominal pain occurs.  The patient verbalized understanding of the proper use and possible adverse effects of NSAIDs.  All of the patient's questions and concerns were addressed.
Libtayo Pregnancy And Lactation Text: This medication is contraindicated in pregnancy and when breast feeding.
Winlevi Counseling:  I discussed with the patient the risks of topical clascoterone including but not limited to erythema, scaling, itching, and stinging. Patient voiced their understanding.
Tazorac Pregnancy And Lactation Text: This medication is not safe during pregnancy. It is unknown if this medication is excreted in breast milk.
Albendazole Counseling:  I discussed with the patient the risks of albendazole including but not limited to cytopenia, kidney damage, nausea/vomiting and severe allergy.  The patient understands that this medication is being used in an off-label manner.
Cyclophosphamide Counseling:  I discussed with the patient the risks of cyclophosphamide including but not limited to hair loss, hormonal abnormalities, decreased fertility, abdominal pain, diarrhea, nausea and vomiting, bone marrow suppression and infection. The patient understands that monitoring is required while taking this medication.
Taltz Counseling: I discussed with the patient the risks of ixekizumab including but not limited to immunosuppression, serious infections, worsening of inflammatory bowel disease and drug reactions.  The patient understands that monitoring is required including a PPD at baseline and must alert us or the primary physician if symptoms of infection or other concerning signs are noted.
Detail Level: Detailed
Infliximab Counseling:  I discussed with the patient the risks of infliximab including but not limited to myelosuppression, immunosuppression, autoimmune hepatitis, demyelinating diseases, lymphoma, and serious infections.  The patient understands that monitoring is required including a PPD at baseline and must alert us or the primary physician if symptoms of infection or other concerning signs are noted.
Valtrex Counseling: I discussed with the patient the risks of valacyclovir including but not limited to kidney damage, nausea, vomiting and severe allergy.  The patient understands that if the infection seems to be worsening or is not improving, they are to call.
Azelaic Acid Counseling: Patient counseled that medicine may cause skin irritation and to avoid applying near the eyes.  In the event of skin irritation, the patient was advised to reduce the amount of the drug applied or use it less frequently.   The patient verbalized understanding of the proper use and possible adverse effects of azelaic acid.  All of the patient's questions and concerns were addressed.
Cephalexin Pregnancy And Lactation Text: This medication is Pregnancy Category B and considered safe during pregnancy.  It is also excreted in breast milk but can be used safely for shorter doses.
Birth Control Pills Counseling: Birth Control Pill Counseling: I discussed with the patient the potential side effects of OCPs including but not limited to increased risk of stroke, heart attack, thrombophlebitis, deep venous thrombosis, hepatic adenomas, breast changes, GI upset, headaches, and depression.  The patient verbalized understanding of the proper use and possible adverse effects of OCPs. All of the patient's questions and concerns were addressed.
Drysol Counseling:  I discussed with the patient the risks of drysol/aluminum chloride including but not limited to skin rash, itching, irritation, burning.
Humira Counseling:  I discussed with the patient the risks of adalimumab including but not limited to myelosuppression, immunosuppression, autoimmune hepatitis, demyelinating diseases, lymphoma, and serious infections.  The patient understands that monitoring is required including a PPD at baseline and must alert us or the primary physician if symptoms of infection or other concerning signs are noted.
Protopic Counseling: Patient may experience a mild burning sensation during topical application. Protopic is not approved in children less than 2 years of age. There have been case reports of hematologic and skin malignancies in patients using topical calcineurin inhibitors although causality is questionable.
Terbinafine Counseling: Patient counseling regarding adverse effects of terbinafine including but not limited to headache, diarrhea, rash, upset stomach, liver function test abnormalities, itching, taste/smell disturbance, nausea, abdominal pain, and flatulence.  There is a rare possibility of liver failure that can occur when taking terbinafine.  The patient understands that a baseline LFT and kidney function test may be required. The patient verbalized understanding of the proper use and possible adverse effects of terbinafine.  All of the patient's questions and concerns were addressed.
Arava Counseling:  Patient counseled regarding adverse effects of Arava including but not limited to nausea, vomiting, abnormalities in liver function tests. Patients may develop mouth sores, rash, diarrhea, and abnormalities in blood counts. The patient understands that monitoring is required including LFTs and blood counts.  There is a rare possibility of scarring of the liver and lung problems that can occur when taking methotrexate. Persistent nausea, loss of appetite, pale stools, dark urine, cough, and shortness of breath should be reported immediately. Patient advised to discontinue Arava treatment and consult with a physician prior to attempting conception. The patient will have to undergo a treatment to eliminate Arava from the body prior to conception.
Minocycline Counseling: Patient advised regarding possible photosensitivity and discoloration of the teeth, skin, lips, tongue and gums.  Patient instructed to avoid sunlight, if possible.  When exposed to sunlight, patients should wear protective clothing, sunglasses, and sunscreen.  The patient was instructed to call the office immediately if the following severe adverse effects occur:  hearing changes, easy bruising/bleeding, severe headache, or vision changes.  The patient verbalized understanding of the proper use and possible adverse effects of minocycline.  All of the patient's questions and concerns were addressed.
Cibinqo Pregnancy And Lactation Text: It is unknown if this medication will adversely affect pregnancy or breast feeding.  You should not take this medication if you are currently pregnant or planning a pregnancy or while breastfeeding.
Acitretin Counseling:  I discussed with the patient the risks of acitretin including but not limited to hair loss, dry lips/skin/eyes, liver damage, hyperlipidemia, depression/suicidal ideation, photosensitivity.  Serious rare side effects can include but are not limited to pancreatitis, pseudotumor cerebri, bony changes, clot formation/stroke/heart attack.  Patient understands that alcohol is contraindicated since it can result in liver toxicity and significantly prolong the elimination of the drug by many years.
Glycopyrrolate Counseling:  I discussed with the patient the risks of glycopyrrolate including but not limited to skin rash, drowsiness, dry mouth, difficulty urinating, and blurred vision.
Topical Clindamycin Counseling: Patient counseled that this medication may cause skin irritation or allergic reactions.  In the event of skin irritation, the patient was advised to reduce the amount of the drug applied or use it less frequently.   The patient verbalized understanding of the proper use and possible adverse effects of clindamycin.  All of the patient's questions and concerns were addressed.
Valtrex Pregnancy And Lactation Text: this medication is Pregnancy Category B and is considered safe during pregnancy. This medication is not directly found in breast milk but it's metabolite acyclovir is present.
Azelaic Acid Pregnancy And Lactation Text: This medication is considered safe during pregnancy and breast feeding.
Simponi Counseling:  I discussed with the patient the risks of golimumab including but not limited to myelosuppression, immunosuppression, autoimmune hepatitis, demyelinating diseases, lymphoma, and serious infections.  The patient understands that monitoring is required including a PPD at baseline and must alert us or the primary physician if symptoms of infection or other concerning signs are noted.
Cimzia Pregnancy And Lactation Text: This medication crosses the placenta but can be considered safe in certain situations. Cimzia may be excreted in breast milk.
Cyclophosphamide Pregnancy And Lactation Text: This medication is Pregnancy Category D and it isn't considered safe during pregnancy. This medication is excreted in breast milk.
Nsaids Pregnancy And Lactation Text: These medications are considered safe up to 30 weeks gestation. It is excreted in breast milk.
Propranolol Counseling:  I discussed with the patient the risks of propranolol including but not limited to low heart rate, low blood pressure, low blood sugar, restlessness and increased cold sensitivity. They should call the office if they experience any of these side effects.
Odomzo Counseling- I discussed with the patient the risks of Odomzo including but not limited to nausea, vomiting, diarrhea, constipation, weight loss, changes in the sense of taste, decreased appetite, muscle spasms, and hair loss.  The patient verbalized understanding of the proper use and possible adverse effects of Odomzo.  All of the patient's questions and concerns were addressed.
Winlevi Pregnancy And Lactation Text: This medication is considered safe during pregnancy and breastfeeding.
Birth Control Pills Pregnancy And Lactation Text: This medication should be avoided if pregnant and for the first 30 days post-partum.
Protopic Pregnancy And Lactation Text: This medication is Pregnancy Category C. It is unknown if this medication is excreted in breast milk when applied topically.
Acitretin Pregnancy And Lactation Text: This medication is Pregnancy Category X and should not be given to women who are pregnant or may become pregnant in the future. This medication is excreted in breast milk.
Tremfya Counseling: I discussed with the patient the risks of guselkumab including but not limited to immunosuppression, serious infections, and drug reactions.  The patient understands that monitoring is required including a PPD at baseline and must alert us or the primary physician if symptoms of infection or other concerning signs are noted.
Clindamycin Counseling: I counseled the patient regarding use of clindamycin as an antibiotic for prophylactic and/or therapeutic purposes. Clindamycin is active against numerous classes of bacteria, including skin bacteria. Side effects may include nausea, diarrhea, gastrointestinal upset, rash, hives, yeast infections, and in rare cases, colitis.
Minoxidil Counseling: Minoxidil is a topical medication which can increase blood flow where it is applied. It is uncertain how this medication increases hair growth. Side effects are uncommon and include stinging and allergic reactions.
Fluconazole Counseling:  Patient counseled regarding adverse effects of fluconazole including but not limited to headache, diarrhea, nausea, upset stomach, liver function test abnormalities, taste disturbance, and stomach pain.  There is a rare possibility of liver failure that can occur when taking fluconazole.  The patient understands that monitoring of LFTs and kidney function test may be required, especially at baseline. The patient verbalized understanding of the proper use and possible adverse effects of fluconazole.  All of the patient's questions and concerns were addressed.
Litfulo Counseling: Litfulo (Ritlecitinib) Counseling: I discussed with the patient the risks of Litfulo therapy including but not limited to headache, upper respiratory infections, nausea, diarrhea, acne, and urticaria. Live vaccines should be avoided.  This medication has been linked to serious infections; higher rate of mortality; malignancy and lymphoproliferative disorders; major adverse cardiovascular events; thrombosis; decreases in lymphocytes and platelets; liver enzyme elevations; and CPK elevations.

## 2025-01-08 ENCOUNTER — OFFICE VISIT (OUTPATIENT)
Dept: URBAN - METROPOLITAN AREA CLINIC 13 | Facility: CLINIC | Age: 70
End: 2025-01-08
Payer: MEDICARE

## 2025-01-08 DIAGNOSIS — H35.3133 NONEXUDATIVE AGE-RELATED MACULAR DEGENERATION, BILATERAL, ADVANCED ATROPHIC WITHOUT SUBFOVEAL INVOLVEMENT: Primary | ICD-10-CM

## 2025-01-08 PROCEDURE — 92134 CPTRZ OPH DX IMG PST SGM RTA: CPT | Performed by: OPHTHALMOLOGY

## 2025-01-08 ASSESSMENT — INTRAOCULAR PRESSURE
OD: 14
OS: 11

## 2025-01-10 ENCOUNTER — APPOINTMENT (OUTPATIENT)
Dept: URBAN - METROPOLITAN AREA CLINIC 158 | Facility: CLINIC | Age: 70
Setting detail: DERMATOLOGY
End: 2025-01-10

## 2025-01-10 DIAGNOSIS — L20.89 OTHER ATOPIC DERMATITIS: ICD-10-CM

## 2025-01-10 PROCEDURE — ? PHOTOTHERAPY TREATMENT

## 2025-01-10 PROCEDURE — 96910 PHOTCHMTX TAR&UVB/PTRLTM&UVB: CPT

## 2025-01-10 NOTE — PROCEDURE: PHOTOTHERAPY TREATMENT
Protocol For Photochemotherapy: Tar And Nbuvb (Goeckerman Treatment): The patient received Photochemotherapy: Tar and NBUVB (Goeckerman treatment).
Protocol For Photochemotherapy: Mineral Oil And Broad Band Uvb: The patient received Photochemotherapy: Mineral Oil and Broad Band UVB.
Protocol For Uva1: The patient received UVA1.
Protocol: Photochemotherapy: Mineral Oil and NBUVB
Protocol For Uva: The patient received UVA.
Total Body Time: 1:30
Protocol For Photochemotherapy: Triamcinolone Ointment And Nbuvb: The patient received Photochemotherapy: Triamcinolone and NBUVB (triamcinolone ointment applied to all lesions prior to phototherapy).
Comments On Previous Treatment: Will hold at last treatment time and increase by 13 sec at next visit
Protocol For Photochemotherapy: Petrolatum And Nbuvb: The patient received Photochemotherapy: Petrolatum and NBUVB (petrolatum applied to all lesions prior to phototherapy).
Protocol For Broad Band Uvb: The patient received Broad Band UVB.
Protocol For Photochemotherapy For Severe Photoresponsive Dermatoses: Tar And Nbuvb (Goeckerman Treatment): The patient received Photochemotherapy for severe photoresponsive dermatoses: Tar and NBUVB (Goeckerman treatment) requiring at least 4 to 8 hours of care under direct physician supervision.
Protocol For Bath Puva: The patient received Bath PUVA.
Protocol For Photochemotherapy For Severe Photoresponsive Dermatoses: Tar And Broad Band Uvb (Goeckerman Treatment): The patient received Photochemotherapy for severe photoresponsive dermatoses: Tar and Broad Band UVB (Goeckerman treatment) requiring at least 4 to 8 hours of care under direct physician supervision.
Detail Level: Zone
Post-Care Instructions: I reviewed with the patient in detail post-care instructions. Patient is to wear sun protection. Patients may expect sunburn like redness, discomfort and scabbing.
Name Of Supervising Technician: Katherine COUCH MA
Protocol For Photochemotherapy: Tar And Broad Band Uvb (Goeckerman Treatment): The patient received Photochemotherapy: Tar and Broad Band UVB (Goeckerman treatment).
Consent: Written consent obtained.  The risks were reviewed with the patient including but not limited to: burn, pigmentary changes, pain, blistering, scabbing, redness, increased risk of skin cancers, and the remote possibility of scarring.
Protocol For Photochemotherapy: Baby Oil And Nbuvb: The patient received Photochemotherapy: Baby Oil and NBUVB (baby oil applied to all lesions prior to phototherapy).
Treatment Number: 6
Protocol For Photochemotherapy For Severe Photoresponsive Dermatoses: Petrolatum And Nbuvb: The patient received Photochemotherapy for severe photoresponsive dermatoses: Petrolatum and NBUVB requiring at least 4 to 8 hours of care under direct physician supervision.
Protocol For Protocol For Photochemotherapy For Severe Photoresponsive Dermatoses: Bath Puva: The patient received Photochemotherapy for severe photoresponsive dermatoses: Bath PUVA requiring at least 4 to 8 hours of care under direct physician supervision.
Protocol For Photochemotherapy For Severe Photoresponsive Dermatoses: Petrolatum And Broad Band Uvb: The patient received Photochemotherapyfor severe photoresponsive dermatoses: Petrolatum and Broad Band UVB requiring at least 4 to 8 hours of care under direct physician supervision.
Render Post-Care In The Note: no
Protocol For Photochemotherapy: Mineral Oil And Nbuvb: The patient received Photochemotherapy: Mineral Oil and NBUVB (mineral oil applied to all lesions prior to phototherapy).
Protocol For Photochemotherapy For Severe Photoresponsive Dermatoses: Puva: The patient received Photochemotherapy for severe photoresponsive dermatoses: PUVA requiring at least 4 to 8 hours of care under direct physician supervision.
Protocol For Nbuvb: Hands/Feet: The patient received NBUVB.
Protocol For Photochemotherapy: Petrolatum And Broad Band Uvb: The patient received Photochemotherapy: Petrolatum and Broad Band UVB.
Protocol For Puva: The patient received PUVA.
Protocol For Nb Uva: The patient received NB UVA.

## 2025-01-15 ENCOUNTER — APPOINTMENT (OUTPATIENT)
Dept: URBAN - METROPOLITAN AREA CLINIC 158 | Facility: CLINIC | Age: 70
Setting detail: DERMATOLOGY
End: 2025-01-15

## 2025-01-15 DIAGNOSIS — L20.89 OTHER ATOPIC DERMATITIS: ICD-10-CM

## 2025-01-15 PROCEDURE — ? PHOTOTHERAPY TREATMENT

## 2025-01-15 PROCEDURE — 96910 PHOTCHMTX TAR&UVB/PTRLTM&UVB: CPT

## 2025-01-15 NOTE — PROCEDURE: PHOTOTHERAPY TREATMENT
Protocol For Photochemotherapy: Tar And Nbuvb (Goeckerman Treatment): The patient received Photochemotherapy: Tar and NBUVB (Goeckerman treatment).
Protocol For Photochemotherapy: Mineral Oil And Broad Band Uvb: The patient received Photochemotherapy: Mineral Oil and Broad Band UVB.
Protocol For Uva1: The patient received UVA1.
Protocol: Photochemotherapy: Mineral Oil and NBUVB
Protocol For Uva: The patient received UVA.
Total Body Time: 1:43
Protocol For Photochemotherapy: Triamcinolone Ointment And Nbuvb: The patient received Photochemotherapy: Triamcinolone and NBUVB (triamcinolone ointment applied to all lesions prior to phototherapy).
Protocol For Photochemotherapy: Petrolatum And Nbuvb: The patient received Photochemotherapy: Petrolatum and NBUVB (petrolatum applied to all lesions prior to phototherapy).
Protocol For Broad Band Uvb: The patient received Broad Band UVB.
Protocol For Photochemotherapy For Severe Photoresponsive Dermatoses: Tar And Nbuvb (Goeckerman Treatment): The patient received Photochemotherapy for severe photoresponsive dermatoses: Tar and NBUVB (Goeckerman treatment) requiring at least 4 to 8 hours of care under direct physician supervision.
Protocol For Bath Puva: The patient received Bath PUVA.
Protocol For Photochemotherapy For Severe Photoresponsive Dermatoses: Tar And Broad Band Uvb (Goeckerman Treatment): The patient received Photochemotherapy for severe photoresponsive dermatoses: Tar and Broad Band UVB (Goeckerman treatment) requiring at least 4 to 8 hours of care under direct physician supervision.
Detail Level: Zone
Post-Care Instructions: I reviewed with the patient in detail post-care instructions. Patient is to wear sun protection. Patients may expect sunburn like redness, discomfort and scabbing.
Name Of Supervising Technician: Richelle
Protocol For Photochemotherapy: Tar And Broad Band Uvb (Goeckerman Treatment): The patient received Photochemotherapy: Tar and Broad Band UVB (Goeckerman treatment).
Consent: Written consent obtained.  The risks were reviewed with the patient including but not limited to: burn, pigmentary changes, pain, blistering, scabbing, redness, increased risk of skin cancers, and the remote possibility of scarring.
Protocol For Photochemotherapy: Baby Oil And Nbuvb: The patient received Photochemotherapy: Baby Oil and NBUVB (baby oil applied to all lesions prior to phototherapy).
Treatment Number: 7
Protocol For Photochemotherapy For Severe Photoresponsive Dermatoses: Petrolatum And Nbuvb: The patient received Photochemotherapy for severe photoresponsive dermatoses: Petrolatum and NBUVB requiring at least 4 to 8 hours of care under direct physician supervision.
Protocol For Protocol For Photochemotherapy For Severe Photoresponsive Dermatoses: Bath Puva: The patient received Photochemotherapy for severe photoresponsive dermatoses: Bath PUVA requiring at least 4 to 8 hours of care under direct physician supervision.
Protocol For Photochemotherapy For Severe Photoresponsive Dermatoses: Petrolatum And Broad Band Uvb: The patient received Photochemotherapyfor severe photoresponsive dermatoses: Petrolatum and Broad Band UVB requiring at least 4 to 8 hours of care under direct physician supervision.
Render Post-Care In The Note: no
Protocol For Photochemotherapy: Mineral Oil And Nbuvb: The patient received Photochemotherapy: Mineral Oil and NBUVB (mineral oil applied to all lesions prior to phototherapy).
Protocol For Photochemotherapy For Severe Photoresponsive Dermatoses: Puva: The patient received Photochemotherapy for severe photoresponsive dermatoses: PUVA requiring at least 4 to 8 hours of care under direct physician supervision.
Protocol For Nbuvb: Hands/Feet: The patient received NBUVB.
Protocol For Photochemotherapy: Petrolatum And Broad Band Uvb: The patient received Photochemotherapy: Petrolatum and Broad Band UVB.
Protocol For Puva: The patient received PUVA.
Protocol For Nb Uva: The patient received NB UVA.

## 2025-03-18 ENCOUNTER — APPOINTMENT (OUTPATIENT)
Dept: URBAN - METROPOLITAN AREA CLINIC 158 | Facility: CLINIC | Age: 70
Setting detail: DERMATOLOGY
End: 2025-03-18

## 2025-03-18 DIAGNOSIS — Z79.899 OTHER LONG TERM (CURRENT) DRUG THERAPY: ICD-10-CM

## 2025-03-18 DIAGNOSIS — L12.0 BULLOUS PEMPHIGOID: ICD-10-CM | Status: WELL CONTROLLED

## 2025-03-18 DIAGNOSIS — L20.89 OTHER ATOPIC DERMATITIS: ICD-10-CM | Status: WELL CONTROLLED

## 2025-03-18 PROCEDURE — ? COUNSELING

## 2025-03-18 PROCEDURE — ? HIGH RISK MEDICATION MONITORING

## 2025-03-18 PROCEDURE — ? DUPIXENT MONITORING

## 2025-03-18 PROCEDURE — 99214 OFFICE O/P EST MOD 30 MIN: CPT

## 2025-03-18 PROCEDURE — G2211 COMPLEX E/M VISIT ADD ON: HCPCS

## 2025-03-18 PROCEDURE — ? PRESCRIPTION MEDICATION MANAGEMENT

## 2025-03-18 PROCEDURE — ? PATIENT SPECIFIC COUNSELING

## 2025-03-18 PROCEDURE — ? PRESCRIPTION

## 2025-03-18 RX ORDER — BETAMETHASONE DIPROPIONATE 0.5 MG/G
CREAM TOPICAL BID
Qty: 45 | Refills: 5 | Status: ERX

## 2025-03-18 RX ORDER — HYDROXYZINE HYDROCHLORIDE 25 MG/1
TABLET, FILM COATED ORAL AS DIRECTED
Qty: 60 | Refills: 1 | Status: ERX

## 2025-03-18 ASSESSMENT — LOCATION ZONE DERM: LOCATION ZONE: LEG

## 2025-03-18 ASSESSMENT — LOCATION DETAILED DESCRIPTION DERM
LOCATION DETAILED: RIGHT DISTAL PRETIBIAL REGION
LOCATION DETAILED: RIGHT MEDIAL DISTAL PRETIBIAL REGION

## 2025-03-18 ASSESSMENT — SEVERITY ASSESSMENT: SEVERITY: CLEAR

## 2025-03-18 ASSESSMENT — SEVERITY ASSESSMENT 2020: SEVERITY 2020: MILD

## 2025-03-18 ASSESSMENT — LOCATION SIMPLE DESCRIPTION DERM: LOCATION SIMPLE: RIGHT PRETIBIAL REGION

## 2025-03-18 ASSESSMENT — PAIN INTENSITY VAS: HOW INTENSE IS YOUR PAIN 0 BEING NO PAIN, 10 BEING THE MOST SEVERE PAIN POSSIBLE?: NO PAIN

## 2025-03-18 ASSESSMENT — BSA RASH: BSA RASH: 1

## 2025-03-18 NOTE — PROCEDURE: PATIENT SPECIFIC COUNSELING
Clear, no blisters.  Had a few that never quite formed on back, but now clear and stable on dupixent 300mg qow.  
Detail Level: Zone

## 2025-03-18 NOTE — PROCEDURE: DUPIXENT MONITORING
Detail Level: Zone
Comments: Well controlled, no SE, improved on Dupixent
Add High Risk Medication Management Associated Diagnosis?: No
Length Of Therapy: 4 months

## 2025-03-18 NOTE — PROCEDURE: PRESCRIPTION MEDICATION MANAGEMENT
Render In Strict Bullet Format?: No
Continue Regimen: hydroxyzine HCl 25 mg Take one po qhs PRN for itch\\n\\nbetamethasone dipropionate 0.05 % Apply to eczema twice daily for two weeks, then 2-3 days a week as needed.\\n\\nDupixent 300 mg every 2 weeks as directed, in the future may consider deceasing to once a month if well controlled
Detail Level: Detailed
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

## 2025-03-18 NOTE — PROCEDURE: HIGH RISK MEDICATION MONITORING
Oral Minoxidil Pregnancy And Lactation Text: This medication should only be used when clearly needed if you are pregnant, attempting to become pregnant or breast feeding.
Bactrim Counseling:  I discussed with the patient the risks of sulfa antibiotics including but not limited to GI upset, allergic reaction, drug rash, diarrhea, dizziness, photosensitivity, and yeast infections.  Rarely, more serious reactions can occur including but not limited to aplastic anemia, agranulocytosis, methemoglobinemia, blood dyscrasias, liver or kidney failure, lung infiltrates or desquamative/blistering drug rashes.
Colchicine Pregnancy And Lactation Text: This medication is Pregnancy Category C and isn't considered safe during pregnancy. It is excreted in breast milk.
Wartpeel Counseling:  I discussed with the patient the risks of Wartpeel including but not limited to erythema, scaling, itching, weeping, crusting, and pain.
Nsaids Pregnancy And Lactation Text: These medications are considered safe up to 30 weeks gestation. It is excreted in breast milk.
Quinolones Pregnancy And Lactation Text: This medication is Pregnancy Category C and it isn't know if it is safe during pregnancy. It is also excreted in breast milk.
Simponi Counseling:  I discussed with the patient the risks of golimumab including but not limited to myelosuppression, immunosuppression, autoimmune hepatitis, demyelinating diseases, lymphoma, and serious infections.  The patient understands that monitoring is required including a PPD at baseline and must alert us or the primary physician if symptoms of infection or other concerning signs are noted.
SSKI Counseling:  I discussed with the patient the risks of SSKI including but not limited to thyroid abnormalities, metallic taste, GI upset, fever, headache, acne, arthralgias, paraesthesias, lymphadenopathy, easy bleeding, arrhythmias, and allergic reaction.
Doxepin Pregnancy And Lactation Text: This medication is Pregnancy Category C and it isn't known if it is safe during pregnancy. It is also excreted in breast milk and breast feeding isn't recommended.
Drysol Counseling:  I discussed with the patient the risks of drysol/aluminum chloride including but not limited to skin rash, itching, irritation, burning.
Winlevi Counseling:  I discussed with the patient the risks of topical clascoterone including but not limited to erythema, scaling, itching, and stinging. Patient voiced their understanding.
Odomzo Pregnancy And Lactation Text: This medication is Pregnancy Category X and is absolutely contraindicated during pregnancy. It is unknown if it is excreted in breast milk.
Drysol Pregnancy And Lactation Text: This medication is considered safe during pregnancy and breast feeding.
Topical Clindamycin Counseling: Patient counseled that this medication may cause skin irritation or allergic reactions.  In the event of skin irritation, the patient was advised to reduce the amount of the drug applied or use it less frequently.   The patient verbalized understanding of the proper use and possible adverse effects of clindamycin.  All of the patient's questions and concerns were addressed.
Rifampin Pregnancy And Lactation Text: This medication is Pregnancy Category C and it isn't know if it is safe during pregnancy. It is also excreted in breast milk and should not be used if you are breast feeding.
Tazorac Pregnancy And Lactation Text: This medication is not safe during pregnancy. It is unknown if this medication is excreted in breast milk.
Opzelura Pregnancy And Lactation Text: There is insufficient data to evaluate drug-associated risk for major birth defects, miscarriage, or other adverse maternal or fetal outcomes.  There is a pregnancy registry that monitors pregnancy outcomes in pregnant persons exposed to the medication during pregnancy.  It is unknown if this medication is excreted in breast milk.  Do not breastfeed during treatment and for about 4 weeks after the last dose.
Cellcept Counseling:  I discussed with the patient the risks of mycophenolate mofetil including but not limited to infection/immunosuppression, GI upset, hypokalemia, hypercholesterolemia, bone marrow suppression, lymphoproliferative disorders, malignancy, GI ulceration/bleed/perforation, colitis, interstitial lung disease, kidney failure, progressive multifocal leukoencephalopathy, and birth defects.  The patient understands that monitoring is required including a baseline creatinine and regular CBC testing. In addition, patient must alert us immediately if symptoms of infection or other concerning signs are noted.
Glycopyrrolate Pregnancy And Lactation Text: This medication is Pregnancy Category B and is considered safe during pregnancy. It is unknown if it is excreted breast milk.
Carac Pregnancy And Lactation Text: This medication is Pregnancy Category X and contraindicated in pregnancy and in women who may become pregnant. It is unknown if this medication is excreted in breast milk.
Dupixent Counseling: I discussed with the patient the risks of dupilumab including but not limited to eye infection and irritation, cold sores, injection site reactions, worsening of asthma, allergic reactions and increased risk of parasitic infection.  Live vaccines should be avoided while taking dupilumab. Dupilumab will also interact with certain medications such as warfarin and cyclosporine. The patient understands that monitoring is required and they must alert us or the primary physician if symptoms of infection or other concerning signs are noted.
Simponi Pregnancy And Lactation Text: The risk during pregnancy and breastfeeding is uncertain with this medication.
Cyclosporine Pregnancy And Lactation Text: This medication is Pregnancy Category C and it isn't know if it is safe during pregnancy. This medication is excreted in breast milk.
Valtrex Pregnancy And Lactation Text: this medication is Pregnancy Category B and is considered safe during pregnancy. This medication is not directly found in breast milk but it's metabolite acyclovir is present.
Metronidazole Pregnancy And Lactation Text: This medication is Pregnancy Category B and considered safe during pregnancy.  It is also excreted in breast milk.
Clofazimine Counseling:  I discussed with the patient the risks of clofazimine including but not limited to skin and eye pigmentation, liver damage, nausea/vomiting, gastrointestinal bleeding and allergy.
Cephalexin Pregnancy And Lactation Text: This medication is Pregnancy Category B and considered safe during pregnancy.  It is also excreted in breast milk but can be used safely for shorter doses.
Niacinamide Counseling: I recommended taking niacin or niacinamide, also know as vitamin B3, twice daily. Recent evidence suggests that taking vitamin B3 (500 mg twice daily) can reduce the risk of actinic keratoses and non-melanoma skin cancers. Side effects of vitamin B3 include flushing and headache.
Methotrexate Pregnancy And Lactation Text: This medication is Pregnancy Category X and is known to cause fetal harm. This medication is excreted in breast milk.
Dutasteride Male Counseling: Dustasteride Counseling:  I discussed with the patient the risks of use of dutasteride including but not limited to decreased libido, decreased ejaculate volume, and gynecomastia. Women who can become pregnant should not handle medication.  All of the patient's questions and concerns were addressed.
Doxycycline Counseling:  Patient counseled regarding possible photosensitivity and increased risk for sunburn.  Patient instructed to avoid sunlight, if possible.  When exposed to sunlight, patients should wear protective clothing, sunglasses, and sunscreen.  The patient was instructed to call the office immediately if the following severe adverse effects occur:  hearing changes, easy bruising/bleeding, severe headache, or vision changes.  The patient verbalized understanding of the proper use and possible adverse effects of doxycycline.  All of the patient's questions and concerns were addressed.
Picato Counseling:  I discussed with the patient the risks of Picato including but not limited to erythema, scaling, itching, weeping, crusting, and pain.
Infliximab Pregnancy And Lactation Text: This medication is Pregnancy Category B and is considered safe during pregnancy. It is unknown if this medication is excreted in breast milk.
Use Enhanced Medication Counseling?: No
5-Fu Counseling: 5-Fluorouracil Counseling:  I discussed with the patient the risks of 5-fluorouracil including but not limited to erythema, scaling, itching, weeping, crusting, and pain.
Dupixent Pregnancy And Lactation Text: This medication likely crosses the placenta but the risk for the fetus is uncertain. This medication is excreted in breast milk.
Skyrizi Counseling: I discussed with the patient the risks of risankizumab-rzaa including but not limited to immunosuppression, and serious infections.  The patient understands that monitoring is required including a PPD at baseline and must alert us or the primary physician if symptoms of infection or other concerning signs are noted.
Spevigo Pregnancy And Lactation Text: The risk during pregnancy and breastfeeding is uncertain with this medication. This medication does cross the placenta. It is unknown if this medication is found in breast milk.
Tetracycline Counseling: Patient counseled regarding possible photosensitivity and increased risk for sunburn.  Patient instructed to avoid sunlight, if possible.  When exposed to sunlight, patients should wear protective clothing, sunglasses, and sunscreen.  The patient was instructed to call the office immediately if the following severe adverse effects occur:  hearing changes, easy bruising/bleeding, severe headache, or vision changes.  The patient verbalized understanding of the proper use and possible adverse effects of tetracycline.  All of the patient's questions and concerns were addressed. Patient understands to avoid pregnancy while on therapy due to potential birth defects.
Finasteride Pregnancy And Lactation Text: This medication is absolutely contraindicated during pregnancy. It is unknown if it is excreted in breast milk.
Sarecycline Pregnancy And Lactation Text: This medication is Pregnancy Category D and not consider safe during pregnancy. It is also excreted in breast milk.
Albendazole Pregnancy And Lactation Text: This medication is Pregnancy Category C and it isn't known if it is safe during pregnancy. It is also excreted in breast milk.
Olanzapine Pregnancy And Lactation Text: This medication is pregnancy category C.   There are no adequate and well controlled trials with olanzapine in pregnant females.  Olanzapine should be used during pregnancy only if the potential benefit justifies the potential risk to the fetus.   In a study in lactating healthy women, olanzapine was excreted in breast milk.  It is recommended that women taking olanzapine should not breast feed.
Oral Minoxidil Counseling- I discussed with the patient the risks of oral minoxidil including but not limited to shortness of breath, swelling of the feet or ankles, dizziness, lightheadedness, unwanted hair growth and allergic reaction.  The patient verbalized understanding of the proper use and possible adverse effects of oral minoxidil.  All of the patient's questions and concerns were addressed.
Ebglyss Counseling: I discussed with the patient the risks of lebrikizumab including but not limited to eye inflammation and irritation, cold sores, injection site reactions, allergic reactions and increased risk of parasitic infection. The patient understands that monitoring is required and they must alert us or the primary physician if symptoms of infection or other concerning signs are noted.
Eucrisa Counseling: Patient may experience a mild burning sensation during topical application. Eucrisa is not approved in children less than 2 years of age.
Olanzapine Counseling- I discussed with the patient the common side effects of olanzapine including but are not limited to: lack of energy, dry mouth, increased appetite, sleepiness, tremor, constipation, dizziness, changes in behavior, or restlessness.  Explained that teenagers are more likely to experience headaches, abdominal pain, pain in the arms or legs, tiredness, and sleepiness.  Serious side effects include but are not limited: increased risk of death in elderly patients who are confused, have memory loss, or dementia-related psychosis; hyperglycemia; increased cholesterol and triglycerides; and weight gain.
Solaraze Pregnancy And Lactation Text: This medication is Pregnancy Category B and is considered safe. There is some data to suggest avoiding during the third trimester. It is unknown if this medication is excreted in breast milk.
Xelmaddisonz Pregnancy And Lactation Text: This medication is Pregnancy Category D and is not considered safe during pregnancy.  The risk during breast feeding is also uncertain.
Elidel Pregnancy And Lactation Text: This medication is Pregnancy Category C. It is unknown if this medication is excreted in breast milk.
Spevigo Counseling: I discussed with the patient the risks of Spevigo including but not limited to fatigue, nasuea, vomiting, headache, pruritus, urinary tract infection, an infusion related reactions.  The patient understands that monitoring is required including screening for tuberculosis at baseline and yearly screening thereafter while continuing Spevigo therapy. They should contact us if symptoms of infection or other concerning signs are noted.
Valtrex Counseling: I discussed with the patient the risks of valacyclovir including but not limited to kidney damage, nausea, vomiting and severe allergy.  The patient understands that if the infection seems to be worsening or is not improving, they are to call.
Olumiant Pregnancy And Lactation Text: Based on animal studies, Olumiant may cause embryo-fetal harm when administered to pregnant women.  The medication should not be used in pregnancy.  Breastfeeding is not recommended during treatment.
Oxybutynin Pregnancy And Lactation Text: This medication is Pregnancy Category B and is considered safe during pregnancy. It is unknown if it is excreted in breast milk.
Terbinafine Counseling: Patient counseling regarding adverse effects of terbinafine including but not limited to headache, diarrhea, rash, upset stomach, liver function test abnormalities, itching, taste/smell disturbance, nausea, abdominal pain, and flatulence.  There is a rare possibility of liver failure that can occur when taking terbinafine.  The patient understands that a baseline LFT and kidney function test may be required. The patient verbalized understanding of the proper use and possible adverse effects of terbinafine.  All of the patient's questions and concerns were addressed.
Eucrisa Pregnancy And Lactation Text: This medication has not been assigned a Pregnancy Risk Category but animal studies failed to show danger with the topical medication. It is unknown if the medication is excreted in breast milk.
Ilumya Counseling: I discussed with the patient the risks of tildrakizumab including but not limited to immunosuppression, malignancy, posterior leukoencephalopathy syndrome, and serious infections.  The patient understands that monitoring is required including a PPD at baseline and must alert us or the primary physician if symptoms of infection or other concerning signs are noted.
Sotyktu Pregnancy And Lactation Text: There is insufficient data to evaluate whether or not Sotyktu is safe to use during pregnancy.   It is not known if Sotyktu passes into breast milk and whether or not it is safe to use when breastfeeding.  
Clindamycin Pregnancy And Lactation Text: This medication can be used in pregnancy if certain situations. Clindamycin is also present in breast milk.
Rinvoq Counseling: I discussed with the patient the risks of Rinvoq therapy including but not limited to upper respiratory tract infections, shingles, cold sores, bronchitis, nausea, cough, fever, acne, and headache. Live vaccines should be avoided.  This medication has been linked to serious infections; higher rate of mortality; malignancy and lymphoproliferative disorders; major adverse cardiovascular events; thrombosis; thrombocytopenia, anemia, and neutropenia; lipid elevations; liver enzyme elevations; and gastrointestinal perforations.
Clindamycin Counseling: I counseled the patient regarding use of clindamycin as an antibiotic for prophylactic and/or therapeutic purposes. Clindamycin is active against numerous classes of bacteria, including skin bacteria. Side effects may include nausea, diarrhea, gastrointestinal upset, rash, hives, yeast infections, and in rare cases, colitis.
Rituxan Pregnancy And Lactation Text: This medication is Pregnancy Category C and it isn't know if it is safe during pregnancy. It is unknown if this medication is excreted in breast milk but similar antibodies are known to be excreted.
Azathioprine Pregnancy And Lactation Text: This medication is Pregnancy Category D and isn't considered safe during pregnancy. It is unknown if this medication is excreted in breast milk.
Opioid Counseling: I discussed with the patient the potential side effects of opioids including but not limited to addiction, altered mental status, and depression. I stressed avoiding alcohol, benzodiazepines, muscle relaxants and sleep aids unless specifically okayed by a physician. The patient verbalized understanding of the proper use and possible adverse effects of opioids. All of the patient's questions and concerns were addressed. They were instructed to flush the remaining pills down the toilet if they did not need them for pain.
Rhofade Pregnancy And Lactation Text: This medication has not been assigned a Pregnancy Risk Category. It is unknown if the medication is excreted in breast milk.
Erythromycin Pregnancy And Lactation Text: This medication is Pregnancy Category B and is considered safe during pregnancy. It is also excreted in breast milk.
Simlandi Counseling:  I discussed with the patient the risks of adalimumab including but not limited to myelosuppression, immunosuppression, autoimmune hepatitis, demyelinating diseases, lymphoma, and serious infections.  The patient understands that monitoring is required including a PPD at baseline and must alert us or the primary physician if symptoms of infection or other concerning signs are noted.
Dapsone Pregnancy And Lactation Text: This medication is Pregnancy Category C and is not considered safe during pregnancy or breast feeding.
Ebglyss Pregnancy And Lactation Text: This medication likely crosses the placenta but the risk for the fetus is uncertain. It is unknown if this medication is excreted in breast milk.
Topical Sulfur Applications Counseling: Topical Sulfur Counseling: Patient counseled that this medication may cause skin irritation or allergic reactions.  In the event of skin irritation, the patient was advised to reduce the amount of the drug applied or use it less frequently.   The patient verbalized understanding of the proper use and possible adverse effects of topical sulfur application.  All of the patient's questions and concerns were addressed.
Ivermectin Counseling:  Patient instructed to take medication on an empty stomach with a full glass of water.  Patient informed of potential adverse effects including but not limited to nausea, diarrhea, dizziness, itching, and swelling of the extremities or lymph nodes.  The patient verbalized understanding of the proper use and possible adverse effects of ivermectin.  All of the patient's questions and concerns were addressed.
Bexarotene Counseling:  I discussed with the patient the risks of bexarotene including but not limited to hair loss, dry lips/skin/eyes, liver abnormalities, hyperlipidemia, pancreatitis, depression/suicidal ideation, photosensitivity, drug rash/allergic reactions, hypothyroidism, anemia, leukopenia, infection, cataracts, and teratogenicity.  Patient understands that they will need regular blood tests to check lipid profile, liver function tests, white blood cell count, thyroid function tests and pregnancy test if applicable.
Cosentyx Counseling:  I discussed with the patient the risks of Cosentyx including but not limited to worsening of Crohn's disease, immunosuppression, allergic reactions and infections.  The patient understands that monitoring is required including a PPD at baseline and must alert us or the primary physician if symptoms of infection or other concerning signs are noted.
Litfulo Counseling: Litfulo (Ritlecitinib) Counseling: I discussed with the patient the risks of Litfulo therapy including but not limited to headache, upper respiratory infections, nausea, diarrhea, acne, and urticaria. Live vaccines should be avoided.  This medication has been linked to serious infections; higher rate of mortality; malignancy and lymphoproliferative disorders; major adverse cardiovascular events; thrombosis; decreases in lymphocytes and platelets; liver enzyme elevations; and CPK elevations.
Topical Ketoconazole Counseling: Patient counseled that this medication may cause skin irritation or allergic reactions.  In the event of skin irritation, the patient was advised to reduce the amount of the drug applied or use it less frequently.   The patient verbalized understanding of the proper use and possible adverse effects of ketoconazole.  All of the patient's questions and concerns were addressed.
Rituxan Counseling:  I discussed with the patient the risks of Rituxan infusions. Side effects can include infusion reactions, severe drug rashes including mucocutaneous reactions, reactivation of latent hepatitis and other infections and rarely progressive multifocal leukoencephalopathy.  All of the patient's questions and concerns were addressed.
Mirvaso Counseling: Mirvaso is a topical medication which can decrease superficial blood flow where applied. Side effects are uncommon and include stinging, redness and allergic reactions.
Birth Control Pills Counseling: Birth Control Pill Counseling: I discussed with the patient the potential side effects of OCPs including but not limited to increased risk of stroke, heart attack, thrombophlebitis, deep venous thrombosis, hepatic adenomas, breast changes, GI upset, headaches, and depression.  The patient verbalized understanding of the proper use and possible adverse effects of OCPs. All of the patient's questions and concerns were addressed.
Cyclophosphamide Counseling:  I discussed with the patient the risks of cyclophosphamide including but not limited to hair loss, hormonal abnormalities, decreased fertility, abdominal pain, diarrhea, nausea and vomiting, bone marrow suppression and infection. The patient understands that monitoring is required while taking this medication.
Imiquimod Counseling:  I discussed with the patient the risks of imiquimod including but not limited to erythema, scaling, itching, weeping, crusting, and pain.  Patient understands that the inflammatory response to imiquimod is variable from person to person and was educated regarded proper titration schedule.  If flu-like symptoms develop, patient knows to discontinue the medication and contact us.
Cibinqo Pregnancy And Lactation Text: It is unknown if this medication will adversely affect pregnancy or breast feeding.  You should not take this medication if you are currently pregnant or planning a pregnancy or while breastfeeding.
Stelara Counseling:  I discussed with the patient the risks of ustekinumab including but not limited to immunosuppression, malignancy, posterior leukoencephalopathy syndrome, and serious infections.  The patient understands that monitoring is required including a PPD at baseline and must alert us or the primary physician if symptoms of infection or other concerning signs are noted.
Cyclophosphamide Pregnancy And Lactation Text: This medication is Pregnancy Category D and it isn't considered safe during pregnancy. This medication is excreted in breast milk.
Ketoconazole Counseling:   Patient counseled regarding improving absorption with orange juice.  Adverse effects include but are not limited to breast enlargement, headache, diarrhea, nausea, upset stomach, liver function test abnormalities, taste disturbance, and stomach pain.  There is a rare possibility of liver failure that can occur when taking ketoconazole. The patient understands that monitoring of LFTs may be required, especially at baseline. The patient verbalized understanding of the proper use and possible adverse effects of ketoconazole.  All of the patient's questions and concerns were addressed.
Griseofulvin Pregnancy And Lactation Text: This medication is Pregnancy Category X and is known to cause serious birth defects. It is unknown if this medication is excreted in breast milk but breast feeding should be avoided.
Quinolones Counseling:  I discussed with the patient the risks of fluoroquinolones including but not limited to GI upset, allergic reaction, drug rash, diarrhea, dizziness, photosensitivity, yeast infections, liver function test abnormalities, tendonitis/tendon rupture.
Azithromycin Counseling:  I discussed with the patient the risks of azithromycin including but not limited to GI upset, allergic reaction, drug rash, diarrhea, and yeast infections.
Doxycycline Pregnancy And Lactation Text: This medication is Pregnancy Category D and not consider safe during pregnancy. It is also excreted in breast milk but is considered safe for shorter treatment courses.
Cimetidine Counseling:  I discussed with the patient the risks of Cimetidine including but not limited to gynecomastia, headache, diarrhea, nausea, drowsiness, arrhythmias, pancreatitis, skin rashes, psychosis, bone marrow suppression and kidney toxicity.
Methotrexate Counseling:  Patient counseled regarding adverse effects of methotrexate including but not limited to nausea, vomiting, abnormalities in liver function tests. Patients may develop mouth sores, rash, diarrhea, and abnormalities in blood counts. The patient understands that monitoring is required including LFT's and blood counts.  There is a rare possibility of scarring of the liver and lung problems that can occur when taking methotrexate. Persistent nausea, loss of appetite, pale stools, dark urine, cough, and shortness of breath should be reported immediately. Patient advised to discontinue methotrexate treatment at least three months before attempting to become pregnant.  I discussed the need for folate supplements while taking methotrexate.  These supplements can decrease side effects during methotrexate treatment. The patient verbalized understanding of the proper use and possible adverse effects of methotrexate.  All of the patient's questions and concerns were addressed.
Adbry Counseling: I discussed with the patient the risks of tralokinumab including but not limited to eye infection and irritation, cold sores, injection site reactions, worsening of asthma, allergic reactions and increased risk of parasitic infection.  Live vaccines should be avoided while taking tralokinumab. The patient understands that monitoring is required and they must alert us or the primary physician if symptoms of infection or other concerning signs are noted.
Arava Counseling:  Patient counseled regarding adverse effects of Arava including but not limited to nausea, vomiting, abnormalities in liver function tests. Patients may develop mouth sores, rash, diarrhea, and abnormalities in blood counts. The patient understands that monitoring is required including LFTs and blood counts.  There is a rare possibility of scarring of the liver and lung problems that can occur when taking methotrexate. Persistent nausea, loss of appetite, pale stools, dark urine, cough, and shortness of breath should be reported immediately. Patient advised to discontinue Arava treatment and consult with a physician prior to attempting conception. The patient will have to undergo a treatment to eliminate Arava from the body prior to conception.
Protopic Counseling: Patient may experience a mild burning sensation during topical application. Protopic is not approved in children less than 2 years of age. There have been case reports of hematologic and skin malignancies in patients using topical calcineurin inhibitors although causality is questionable.
Niacinamide Pregnancy And Lactation Text: These medications are considered safe during pregnancy.
Griseofulvin Counseling:  I discussed with the patient the risks of griseofulvin including but not limited to photosensitivity, cytopenia, liver damage, nausea/vomiting and severe allergy.  The patient understands that this medication is best absorbed when taken with a fatty meal (e.g., ice cream or french fries).
Benzoyl Peroxide Counseling: Patient counseled that medicine may cause skin irritation and bleach clothing.  In the event of skin irritation, the patient was advised to reduce the amount of the drug applied or use it less frequently.   The patient verbalized understanding of the proper use and possible adverse effects of benzoyl peroxide.  All of the patient's questions and concerns were addressed.
Solaraze Counseling:  I discussed with the patient the risks of Solaraze including but not limited to erythema, scaling, itching, weeping, crusting, and pain.
Metronidazole Counseling:  I discussed with the patient the risks of metronidazole including but not limited to seizures, nausea/vomiting, a metallic taste in the mouth, nausea/vomiting and severe allergy.
Opioid Pregnancy And Lactation Text: These medications can lead to premature delivery and should be avoided during pregnancy. These medications are also present in breast milk in small amounts.
Gabapentin Counseling: I discussed with the patient the risks of gabapentin including but not limited to dizziness, somnolence, fatigue and ataxia.
Erythromycin Counseling:  I discussed with the patient the risks of erythromycin including but not limited to GI upset, allergic reaction, drug rash, diarrhea, increase in liver enzymes, and yeast infections.
Tremfya Counseling: I discussed with the patient the risks of guselkumab including but not limited to immunosuppression, serious infections, worsening of inflammatory bowel disease and drug reactions.  The patient understands that monitoring is required including a PPD at baseline and must alert us or the primary physician if symptoms of infection or other concerning signs are noted.
Low Dose Naltrexone Pregnancy And Lactation Text: Naltrexone is pregnancy category C.  There have been no adequate and well-controlled studies in pregnant women.  It should be used in pregnancy only if the potential benefit justifies the potential risk to the fetus.   Limited data indicates that naltrexone is minimally excreted into breastmilk.
Benzoyl Peroxide Pregnancy And Lactation Text: This medication is Pregnancy Category C. It is unknown if benzoyl peroxide is excreted in breast milk.
Infliximab Counseling:  I discussed with the patient the risks of infliximab including but not limited to myelosuppression, immunosuppression, autoimmune hepatitis, demyelinating diseases, lymphoma, and serious infections.  The patient understands that monitoring is required including a PPD at baseline and must alert us or the primary physician if symptoms of infection or other concerning signs are noted.
Hydroxychloroquine Counseling:  I discussed with the patient that a baseline ophthalmologic exam is needed at the start of therapy and every year thereafter while on therapy. A CBC may also be warranted for monitoring.  The side effects of this medication were discussed with the patient, including but not limited to agranulocytosis, aplastic anemia, seizures, rashes, retinopathy, and liver toxicity. Patient instructed to call the office should any adverse effect occur.  The patient verbalized understanding of the proper use and possible adverse effects of Plaquenil.  All the patient's questions and concerns were addressed.
Xeljanz Counseling: I discussed with the patient the risks of Xeljanz therapy including increased risk of infection, liver issues, headache, diarrhea, or cold symptoms. Live vaccines should be avoided. They were instructed to call if they have any problems.
Xolair Counseling:  Patient informed of potential adverse effects including but not limited to fever, muscle aches, rash and allergic reactions.  The patient verbalized understanding of the proper use and possible adverse effects of Xolair.  All of the patient's questions and concerns were addressed.
Adbry Pregnancy And Lactation Text: It is unknown if this medication will adversely affect pregnancy or breast feeding.
Nemluvio Pregnancy And Lactation Text: It is not known if Nemluvio causes fetal harm or is present in breast milk. Please proceed with caution if patients who are pregnant or breastfeeding.
Sski Pregnancy And Lactation Text: This medication is Pregnancy Category D and isn't considered safe during pregnancy. It is excreted in breast milk.
Ketoconazole Pregnancy And Lactation Text: This medication is Pregnancy Category C and it isn't know if it is safe during pregnancy. It is also excreted in breast milk and breast feeding isn't recommended.
Odomzo Counseling- I discussed with the patient the risks of Odomzo including but not limited to nausea, vomiting, diarrhea, constipation, weight loss, changes in the sense of taste, decreased appetite, muscle spasms, and hair loss.  The patient verbalized understanding of the proper use and possible adverse effects of Odomzo.  All of the patient's questions and concerns were addressed.
Sarecycline Counseling: Patient advised regarding possible photosensitivity and discoloration of the teeth, skin, lips, tongue and gums.  Patient instructed to avoid sunlight, if possible.  When exposed to sunlight, patients should wear protective clothing, sunglasses, and sunscreen.  The patient was instructed to call the office immediately if the following severe adverse effects occur:  hearing changes, easy bruising/bleeding, severe headache, or vision changes.  The patient verbalized understanding of the proper use and possible adverse effects of sarecycline.  All of the patient's questions and concerns were addressed.
Cibinqo Counseling: I discussed with the patient the risks of Cibinqo therapy including but not limited to common cold, nausea, headache, cold sores, increased blood CPK levels, dizziness, UTIs, fatigue, acne, and vomitting. Live vaccines should be avoided.  This medication has been linked to serious infections; higher rate of mortality; malignancy and lymphoproliferative disorders; major adverse cardiovascular events; thrombosis; thrombocytopenia and lymphopenia; lipid elevations; and retinal detachment.
Oxybutynin Counseling:  I discussed with the patient the risks of oxybutynin including but not limited to skin rash, drowsiness, dry mouth, difficulty urinating, and blurred vision.
Hydroquinone Counseling:  Patient advised that medication may result in skin irritation, lightening (hypopigmentation), dryness, and burning.  In the event of skin irritation, the patient was advised to reduce the amount of the drug applied or use it less frequently.  Rarely, spots that are treated with hydroquinone can become darker (pseudoochronosis).  Should this occur, patient instructed to stop medication and call the office. The patient verbalized understanding of the proper use and possible adverse effects of hydroquinone.  All of the patient's questions and concerns were addressed.
Acitretin Pregnancy And Lactation Text: This medication is Pregnancy Category X and should not be given to women who are pregnant or may become pregnant in the future. This medication is excreted in breast milk.
Finasteride Female Counseling: Finasteride Counseling:  I discussed with the patient the risks of use of finasteride including but not limited to decreased libido and sexual dysfunction. Explained the teratogenic nature of the medication and stressed the importance of not getting pregnant during treatment. All of the patient's questions and concerns were addressed.
Colchicine Counseling:  Patient counseled regarding adverse effects including but not limited to stomach upset (nausea, vomiting, stomach pain, or diarrhea).  Patient instructed to limit alcohol consumption while taking this medication.  Colchicine may reduce blood counts especially with prolonged use.  The patient understands that monitoring of kidney function and blood counts may be required, especially at baseline. The patient verbalized understanding of the proper use and possible adverse effects of colchicine.  All of the patient's questions and concerns were addressed.
Carac Counseling:  I discussed with the patient the risks of Carac including but not limited to erythema, scaling, itching, weeping, crusting, and pain.
Dutasteride Female Counseling: Dutasteride Counseling:  I discussed with the patient the risks of use of dutasteride including but not limited to decreased libido and sexual dysfunction. Explained the teratogenic nature of the medication and stressed the importance of not getting pregnant during treatment. All of the patient's questions and concerns were addressed.
Albendazole Counseling:  I discussed with the patient the risks of albendazole including but not limited to cytopenia, kidney damage, nausea/vomiting and severe allergy.  The patient understands that this medication is being used in an off-label manner.
Thalidomide Counseling: I discussed with the patient the risks of thalidomide including but not limited to birth defects, anxiety, weakness, chest pain, dizziness, cough and severe allergy.
Propranolol Counseling:  I discussed with the patient the risks of propranolol including but not limited to low heart rate, low blood pressure, low blood sugar, restlessness and increased cold sensitivity. They should call the office if they experience any of these side effects.
Cimzia Counseling:  I discussed with the patient the risks of Cimzia including but not limited to immunosuppression, allergic reactions and infections.  The patient understands that monitoring is required including a PPD at baseline and must alert us or the primary physician if symptoms of infection or other concerning signs are noted.
Acitretin Counseling:  I discussed with the patient the risks of acitretin including but not limited to hair loss, dry lips/skin/eyes, liver damage, hyperlipidemia, depression/suicidal ideation, photosensitivity.  Serious rare side effects can include but are not limited to pancreatitis, pseudotumor cerebri, bony changes, clot formation/stroke/heart attack.  Patient understands that alcohol is contraindicated since it can result in liver toxicity and significantly prolong the elimination of the drug by many years.
Cephalexin Counseling: I counseled the patient regarding use of cephalexin as an antibiotic for prophylactic and/or therapeutic purposes. Cephalexin (commonly prescribed under brand name Keflex) is a cephalosporin antibiotic which is active against numerous classes of bacteria, including most skin bacteria. Side effects may include nausea, diarrhea, gastrointestinal upset, rash, hives, yeast infections, and in rare cases, hepatitis, kidney disease, seizures, fever, confusion, neurologic symptoms, and others. Patients with severe allergies to penicillin medications are cautioned that there is about a 10% incidence of cross-reactivity with cephalosporins. When possible, patients with penicillin allergies should use alternatives to cephalosporins for antibiotic therapy.
Siliq Counseling:  I discussed with the patient the risks of Siliq including but not limited to new or worsening depression, suicidal thoughts and behavior, immunosuppression, malignancy, posterior leukoencephalopathy syndrome, and serious infections.  The patient understands that monitoring is required including a PPD at baseline and must alert us or the primary physician if symptoms of infection or other concerning signs are noted. There is also a special program designed to monitor depression which is required with Siliq.
Protopic Pregnancy And Lactation Text: This medication is Pregnancy Category C. It is unknown if this medication is excreted in breast milk when applied topically.
Enbrel Counseling:  I discussed with the patient the risks of etanercept including but not limited to myelosuppression, immunosuppression, autoimmune hepatitis, demyelinating diseases, lymphoma, and infections.  The patient understands that monitoring is required including a PPD at baseline and must alert us or the primary physician if symptoms of infection or other concerning signs are noted.
Hydroxychloroquine Pregnancy And Lactation Text: This medication has been shown to cause fetal harm but it isn't assigned a Pregnancy Risk Category. There are small amounts excreted in breast milk.
Itraconazole Counseling:  I discussed with the patient the risks of itraconazole including but not limited to liver damage, nausea/vomiting, neuropathy, and severe allergy.  The patient understands that this medication is best absorbed when taken with acidic beverages such as non-diet cola or ginger ale.  The patient understands that monitoring is required including baseline LFTs and repeat LFTs at intervals.  The patient understands that they are to contact us or the primary physician if concerning signs are noted.
Sotyktu Counseling:  I discussed the most common side effects of Sotyktu including: common cold, sore throat, sinus infections, cold sores, canker sores, folliculitis, and acne.  I also discussed more serious side effects of Sotyktu including but not limited to: serious allergic reactions; increased risk for infections such as TB; cancers such as lymphomas; rhabdomyolysis and elevated CPK; and elevated triglycerides and liver enzymes. 
Libtayo Pregnancy And Lactation Text: This medication is contraindicated in pregnancy and when breast feeding.
Taltz Counseling: I discussed with the patient the risks of ixekizumab including but not limited to immunosuppression, serious infections, worsening of inflammatory bowel disease and drug reactions.  The patient understands that monitoring is required including a PPD at baseline and must alert us or the primary physician if symptoms of infection or other concerning signs are noted.
Low Dose Naltrexone Counseling- I discussed with the patient the potential risks and side effects of low dose naltrexone including but not limited to: more vivid dreams, headaches, nausea, vomiting, abdominal pain, fatigue, dizziness, and anxiety.
Rhofade Counseling: Rhofade is a topical medication which can decrease superficial blood flow where applied. Side effects are uncommon and include stinging, redness and allergic reactions.
Bimzelx Pregnancy And Lactation Text: This medication crosses the placenta and the safety is uncertain during pregnancy. It is unknown if this medication is present in breast milk.
Erivedge Counseling- I discussed with the patient the risks of Erivedge including but not limited to nausea, vomiting, diarrhea, constipation, weight loss, changes in the sense of taste, decreased appetite, muscle spasms, and hair loss.  The patient verbalized understanding of the proper use and possible adverse effects of Erivedge.  All of the patient's questions and concerns were addressed.
Litfulo Pregnancy And Lactation Text: There is insufficient data to evaluate whether or not Litfulo is safe to use during pregnancy.  Breastfeeding is not recommended during treatment.
Cimetidine Pregnancy And Lactation Text: This medication is Pregnancy Category B and is considered safe during pregnancy. It is also excreted in breast milk and breast feeding isn't recommended.
Otezla Counseling: The side effects of Otezla were discussed with the patient, including but not limited to worsening or new depression, weight loss, diarrhea, nausea, upper respiratory tract infection, and headache. Patient instructed to call the office should any adverse effect occur.  The patient verbalized understanding of the proper use and possible adverse effects of Otezla.  All the patient's questions and concerns were addressed.
Otezla Pregnancy And Lactation Text: This medication is Pregnancy Category C and it isn't known if it is safe during pregnancy. It is unknown if it is excreted in breast milk.
Tranexamic Acid Pregnancy And Lactation Text: It is unknown if this medication is safe during pregnancy or breast feeding.
Dapsone Counseling: I discussed with the patient the risks of dapsone including but not limited to hemolytic anemia, agranulocytosis, rashes, methemoglobinemia, kidney failure, peripheral neuropathy, headaches, GI upset, and liver toxicity.  Patients who start dapsone require monitoring including baseline LFTs and weekly CBCs for the first month, then every month thereafter.  The patient verbalized understanding of the proper use and possible adverse effects of dapsone.  All of the patient's questions and concerns were addressed.
Minocycline Counseling: Patient advised regarding possible photosensitivity and discoloration of the teeth, skin, lips, tongue and gums.  Patient instructed to avoid sunlight, if possible.  When exposed to sunlight, patients should wear protective clothing, sunglasses, and sunscreen.  The patient was instructed to call the office immediately if the following severe adverse effects occur:  hearing changes, easy bruising/bleeding, severe headache, or vision changes.  The patient verbalized understanding of the proper use and possible adverse effects of minocycline.  All of the patient's questions and concerns were addressed.
Nsaids Counseling: NSAID Counseling: I discussed with the patient that NSAIDs should be taken with food. Prolonged use of NSAIDs can result in the development of stomach ulcers.  Patient advised to stop taking NSAIDs if abdominal pain occurs.  The patient verbalized understanding of the proper use and possible adverse effects of NSAIDs.  All of the patient's questions and concerns were addressed.
Fluconazole Counseling:  Patient counseled regarding adverse effects of fluconazole including but not limited to headache, diarrhea, nausea, upset stomach, liver function test abnormalities, taste disturbance, and stomach pain.  There is a rare possibility of liver failure that can occur when taking fluconazole.  The patient understands that monitoring of LFTs and kidney function test may be required, especially at baseline. The patient verbalized understanding of the proper use and possible adverse effects of fluconazole.  All of the patient's questions and concerns were addressed.
Hydroxyzine Pregnancy And Lactation Text: This medication is not safe during pregnancy and should not be taken. It is also excreted in breast milk and breast feeding isn't recommended.
Isotretinoin Counseling: Patient should get monthly blood tests, not donate blood, not drive at night if vision affected, not share medication, and not undergo elective surgery for 6 months after tx completed. Side effects reviewed, pt to contact office should one occur.
Zyclara Counseling:  I discussed with the patient the risks of imiquimod including but not limited to erythema, scaling, itching, weeping, crusting, and pain.  Patient understands that the inflammatory response to imiquimod is variable from person to person and was educated regarded proper titration schedule.  If flu-like symptoms develop, patient knows to discontinue the medication and contact us.
Nemluvio Counseling: I discussed with the patient the risks of nemolizumab including but not limited to headache, gastrointestinal complaints, nasopharyngitis, musculoskeletal complaints, injection site reactions, and allergic reactions. The patient understands that monitoring is required and they must alert us or the primary physician if any side effects are noted.
Cyclosporine Counseling:  I discussed with the patient the risks of cyclosporine including but not limited to hypertension, gingival hyperplasia,myelosuppression, immunosuppression, liver damage, kidney damage, neurotoxicity, lymphoma, and serious infections. The patient understands that monitoring is required including baseline blood pressure, CBC, CMP, lipid panel and uric acid, and then 1-2 times monthly CMP and blood pressure.
High Dose Vitamin A Counseling: Side effects reviewed, pt to contact office should one occur.
Finasteride Male Counseling: Finasteride Counseling:  I discussed with the patient the risks of use of finasteride including but not limited to decreased libido, decreased ejaculate volume, gynecomastia, and depression. Women should not handle medication.  All of the patient's questions and concerns were addressed.
Humira Counseling:  I discussed with the patient the risks of adalimumab including but not limited to myelosuppression, immunosuppression, autoimmune hepatitis, demyelinating diseases, lymphoma, and serious infections.  The patient understands that monitoring is required including a PPD at baseline and must alert us or the primary physician if symptoms of infection or other concerning signs are noted.
Bexarotene Pregnancy And Lactation Text: This medication is Pregnancy Category X and should not be given to women who are pregnant or may become pregnant. This medication should not be used if you are breast feeding.
Propranolol Pregnancy And Lactation Text: This medication is Pregnancy Category C and it isn't known if it is safe during pregnancy. It is excreted in breast milk.
Birth Control Pills Pregnancy And Lactation Text: This medication should be avoided if pregnant and for the first 30 days post-partum.
Doxepin Counseling:  Patient advised that the medication is sedating and not to drive a car after taking this medication. Patient informed of potential adverse effects including but not limited to dry mouth, urinary retention, and blurry vision.  The patient verbalized understanding of the proper use and possible adverse effects of doxepin.  All of the patient's questions and concerns were addressed.
Winlevi Pregnancy And Lactation Text: This medication is considered safe during pregnancy and breastfeeding.
Calcipotriene Counseling:  I discussed with the patient the risks of calcipotriene including but not limited to erythema, scaling, itching, and irritation.
Minoxidil Counseling: Minoxidil is a topical medication which can increase blood flow where it is applied. It is uncertain how this medication increases hair growth. Side effects are uncommon and include stinging and allergic reactions.
Spironolactone Pregnancy And Lactation Text: This medication can cause feminization of the male fetus and should be avoided during pregnancy. The active metabolite is also found in breast milk.
Olumiant Counseling: I discussed with the patient the risks of Olumiant therapy including but not limited to upper respiratory tract infections, shingles, cold sores, and nausea. Live vaccines should be avoided.  This medication has been linked to serious infections; higher rate of mortality; malignancy and lymphoproliferative disorders; major adverse cardiovascular events; thrombosis; gastrointestinal perforations; neutropenia; lymphopenia; anemia; liver enzyme elevations; and lipid elevations.
Azithromycin Pregnancy And Lactation Text: This medication is considered safe during pregnancy and is also secreted in breast milk.
Rifampin Counseling: I discussed with the patient the risks of rifampin including but not limited to liver damage, kidney damage, red-orange body fluids, nausea/vomiting and severe allergy.
High Dose Vitamin A Pregnancy And Lactation Text: High dose vitamin A therapy is contraindicated during pregnancy and breast feeding.
Spironolactone Counseling: Patient advised regarding risks of diarrhea, abdominal pain, hyperkalemia, birth defects (for female patients), liver toxicity and renal toxicity. The patient may need blood work to monitor liver and kidney function and potassium levels while on therapy. The patient verbalized understanding of the proper use and possible adverse effects of spironolactone.  All of the patient's questions and concerns were addressed.
Rinvoq Pregnancy And Lactation Text: Based on animal studies, Rinvoq may cause embryo-fetal harm when administered to pregnant women.  The medication should not be used in pregnancy.  Breastfeeding is not recommended during treatment and for 6 days after the last dose.
Opzelura Counseling:  I discussed with the patient the risks of Opzelura including but not limited to nasopharngitis, bronchitis, ear infection, eosinophila, hives, diarrhea, folliculitis, tonsillitis, and rhinorrhea.  Taken orally, this medication has been linked to serious infections; higher rate of mortality; malignancy and lymphoproliferative disorders; major adverse cardiovascular events; thrombosis; thrombocytopenia, anemia, and neutropenia; and lipid elevations.
Topical Retinoid counseling:  Patient advised to apply a pea-sized amount only at bedtime and wait 30 minutes after washing their face before applying.  If too drying, patient may add a non-comedogenic moisturizer. The patient verbalized understanding of the proper use and possible adverse effects of retinoids.  All of the patient's questions and concerns were addressed.
Dutasteride Pregnancy And Lactation Text: This medication is absolutely contraindicated in women, especially during pregnancy and breast feeding. Feminization of male fetuses is possible if taking while pregnant.
Detail Level: Detailed
Isotretinoin Pregnancy And Lactation Text: This medication is Pregnancy Category X and is considered extremely dangerous during pregnancy. It is unknown if it is excreted in breast milk.
Azelaic Acid Counseling: Patient counseled that medicine may cause skin irritation and to avoid applying near the eyes.  In the event of skin irritation, the patient was advised to reduce the amount of the drug applied or use it less frequently.   The patient verbalized understanding of the proper use and possible adverse effects of azelaic acid.  All of the patient's questions and concerns were addressed.
Calcipotriene Pregnancy And Lactation Text: This medication has not been proven safe during pregnancy. It is unknown if this medication is excreted in breast milk.
Azathioprine Counseling:  I discussed with the patient the risks of azathioprine including but not limited to myelosuppression, immunosuppression, hepatotoxicity, lymphoma, and infections.  The patient understands that monitoring is required including baseline LFTs, Creatinine, possible TPMP genotyping and weekly CBCs for the first month and then every 2 weeks thereafter.  The patient verbalized understanding of the proper use and possible adverse effects of azathioprine.  All of the patient's questions and concerns were addressed.
Bimzelx Counseling:  I discussed with the patient the risks of Bimzelx including but not limited to depression, immunosuppression, allergic reactions and infections.  The patient understands that monitoring is required including a PPD at baseline and must alert us or the primary physician if symptoms of infection or other concerning signs are noted.
Xolair Pregnancy And Lactation Text: This medication is Pregnancy Category B and is considered safe during pregnancy. This medication is excreted in breast milk.
Elidel Counseling: Patient may experience a mild burning sensation during topical application. Elidel is not approved in children less than 2 years of age. There have been case reports of hematologic and skin malignancies in patients using topical calcineurin inhibitors although causality is questionable.
Hydroxyzine Counseling: Patient advised that the medication is sedating and not to drive a car after taking this medication.  Patient informed of potential adverse effects including but not limited to dry mouth, urinary retention, and blurry vision.  The patient verbalized understanding of the proper use and possible adverse effects of hydroxyzine.  All of the patient's questions and concerns were addressed.
Topical Sulfur Applications Pregnancy And Lactation Text: This medication is Pregnancy Category C and has an unknown safety profile during pregnancy. It is unknown if this topical medication is excreted in breast milk.
Bactrim Pregnancy And Lactation Text: This medication is Pregnancy Category D and is known to cause fetal risk.  It is also excreted in breast milk.
Tazorac Counseling:  Patient advised that medication is irritating and drying.  Patient may need to apply sparingly and wash off after an hour before eventually leaving it on overnight.  The patient verbalized understanding of the proper use and possible adverse effects of tazorac.  All of the patient's questions and concerns were addressed.
Glycopyrrolate Counseling:  I discussed with the patient the risks of glycopyrrolate including but not limited to skin rash, drowsiness, dry mouth, difficulty urinating, and blurred vision.
Prednisone Counseling:  I discussed with the patient the risks of prolonged use of prednisone including but not limited to weight gain, insomnia, osteoporosis, mood changes, diabetes, susceptibility to infection, glaucoma and high blood pressure.  In cases where prednisone use is prolonged, patients should be monitored with blood pressure checks, serum glucose levels and an eye exam.  Additionally, the patient may need to be placed on GI prophylaxis, PCP prophylaxis, and calcium and vitamin D supplementation and/or a bisphosphonate.  The patient verbalized understanding of the proper use and the possible adverse effects of prednisone.  All of the patient's questions and concerns were addressed.
Hyrimoz Counseling:  I discussed with the patient the risks of adalimumab including but not limited to myelosuppression, immunosuppression, autoimmune hepatitis, demyelinating diseases, lymphoma, and serious infections.  The patient understands that monitoring is required including a PPD at baseline and must alert us or the primary physician if symptoms of infection or other concerning signs are noted.
Cimzia Pregnancy And Lactation Text: This medication crosses the placenta but can be considered safe in certain situations. Cimzia may be excreted in breast milk.
Tranexamic Acid Counseling:  Patient advised of the small risk of bleeding problems with tranexamic acid. They were also instructed to call if they developed any nausea, vomiting or diarrhea. All of the patient's questions and concerns were addressed.
Libtayo Counseling- I discussed with the patient the risks of Libtayo including but not limited to nausea, vomiting, diarrhea, and bone or muscle pain.  The patient verbalized understanding of the proper use and possible adverse effects of Libtayo.  All of the patient's questions and concerns were addressed.

## 2025-03-18 NOTE — HPI: MEDICATION (DUPIXENT)
Is This A New Presentation, Or A Follow-Up?: Follow Up Roxanne
What Type Of Rash Do You Have?: atopic dermatitis
How Severe Is It?: mild
How Long Have You Been Taking Dupixent?: 4 months

## 2025-04-01 ENCOUNTER — OFFICE VISIT (OUTPATIENT)
Dept: URBAN - METROPOLITAN AREA CLINIC 54 | Facility: CLINIC | Age: 70
End: 2025-04-01
Payer: MEDICARE

## 2025-04-01 DIAGNOSIS — H43.813 BILATERAL VITREOUS DETACHMENT OF EYES: ICD-10-CM

## 2025-04-01 DIAGNOSIS — H35.3133 NEXDTVE AGE-REL MCLR DEGN, BI, ADV ATRPC WITHOUT SBFVL INVL: Primary | ICD-10-CM

## 2025-04-01 DIAGNOSIS — H25.13 AGE-RELATED NUCLEAR CATARACT, BILATERAL: ICD-10-CM

## 2025-04-01 PROCEDURE — 92134 CPTRZ OPH DX IMG PST SGM RTA: CPT | Performed by: OPHTHALMOLOGY

## 2025-04-01 PROCEDURE — 99214 OFFICE O/P EST MOD 30 MIN: CPT | Performed by: OPHTHALMOLOGY

## 2025-04-01 ASSESSMENT — INTRAOCULAR PRESSURE
OD: 13
OS: 13

## 2025-04-28 RX ORDER — BETAMETHASONE DIPROPIONATE 0.5 MG/G
CREAM TOPICAL BID
Qty: 45 | Refills: 5 | Status: ERX

## 2025-06-25 ENCOUNTER — RX ONLY (RX ONLY)
Age: 70
End: 2025-06-25

## 2025-06-25 RX ORDER — BETAMETHASONE DIPROPIONATE 0.5 MG/G
1 OINTMENT TOPICAL BID
Qty: 45 | Refills: 2 | Status: ERX

## 2025-07-14 ENCOUNTER — RX ONLY (RX ONLY)
Age: 70
End: 2025-07-14

## 2025-07-14 RX ORDER — HYDROXYZINE HYDROCHLORIDE 25 MG/1
TABLET, FILM COATED ORAL AS DIRECTED
Qty: 60 | Refills: 1 | Status: ERX

## 2025-07-28 ENCOUNTER — OFFICE VISIT (OUTPATIENT)
Dept: URBAN - METROPOLITAN AREA CLINIC 54 | Facility: CLINIC | Age: 70
End: 2025-07-28
Payer: MEDICARE

## 2025-07-28 DIAGNOSIS — H35.3133 NONEXUDATIVE AGE-RELATED MACULAR DEGENERATION, BILATERAL, ADVANCED ATROPHIC WITHOUT SUBFOVEAL INVOLVEMENT: Primary | ICD-10-CM

## 2025-07-28 PROCEDURE — 92134 CPTRZ OPH DX IMG PST SGM RTA: CPT | Performed by: OPHTHALMOLOGY

## 2025-07-28 ASSESSMENT — INTRAOCULAR PRESSURE
OD: 12
OS: 14

## (undated) DEVICE — PAD SACRAL SPAN AID

## (undated) DEVICE — SUTURING DEVICE: Brand: ENDO STITCH

## (undated) DEVICE — SOL  .9 1000ML BTL

## (undated) DEVICE — TRANSPOSAL ULTRAFLEX DUO/QUAD ULTRA CART MANIFOLD

## (undated) DEVICE — CHLORAPREP ORANGE TINT 10.5ML

## (undated) DEVICE — C-ARMOR C-ARM EQUIPMENT COVERS CLEAR STERILE UNIVERSAL FIT 12 PER CASE: Brand: C-ARMOR

## (undated) DEVICE — HOVERMATT 34IN SINGLE USE

## (undated) DEVICE — DISTRACTION SCREW: Brand: MAYFIELD®

## (undated) DEVICE — VIOLET BRAIDED (POLYGLACTIN 910), SYNTHETIC ABSORBABLE SUTURE: Brand: COATED VICRYL

## (undated) DEVICE — 3M(TM) TEGADERM(TM) TRANSPARENT FILM DRESSING FRAME STYLE 1628: Brand: 3M™ TEGADERM™

## (undated) DEVICE — GOWN,SIRUS,FABRIC-REINFORCED,X-LARGE: Brand: MEDLINE

## (undated) DEVICE — LAP CHOLE: Brand: MEDLINE INDUSTRIES, INC.

## (undated) DEVICE — 3M(TM) MEDIPORE(TM) +PAD SOFT CLOTH ADHESIVE WOUND DRESSING 3569: Brand: 3M™ MEDIPORE™

## (undated) DEVICE — SUTURE ETHILON 3-0 FS-1

## (undated) DEVICE — SOL  .9 3000ML

## (undated) DEVICE — REM POLYHESIVE ADULT PATIENT RETURN ELECTRODE: Brand: VALLEYLAB

## (undated) DEVICE — ENDOSTITCH SURGIDAC 0

## (undated) DEVICE — KENDALL SCD EXPRESS SLEEVES, KNEE LENGTH, MEDIUM: Brand: KENDALL SCD

## (undated) DEVICE — TROCAR: Brand: KII FIOS FIRST ENTRY

## (undated) DEVICE — TISSUE RETRIEVAL SYSTEM: Brand: INZII RETRIEVAL SYSTEM

## (undated) DEVICE — SUTURE VICRYL 0 CP-2

## (undated) DEVICE — CASED DISP BIPOLAR CORD

## (undated) DEVICE — SYRINGE 10ML LL CONTRL SYRINGE

## (undated) DEVICE — KENDALL SCD EXPRESS SLEEVES, THIGH LENGTH, MEDIUM: Brand: KENDALL SCD

## (undated) DEVICE — CAUTERY BLADE 2IN INS E1455

## (undated) DEVICE — 3M™ MEDIPORE™ SOFT CLOTH TAPE, 4 INCH X 10 YARDS, 12 ROLLS/CASE, 2964: Brand: 3M™ MEDIPORE™

## (undated) DEVICE — GAMMEX® NON-LATEX PI TEXTURED SIZE 7.5, STERILE POLYISOPRENE POWDER-FREE SURGICAL GLOVE: Brand: GAMMEX

## (undated) DEVICE — HEMOCLIP HORIZON MED 002200

## (undated) DEVICE — DRAPE,THYROID,SOFT,STERILE: Brand: MEDLINE

## (undated) DEVICE — TROCARS: Brand: KII® BALLOON BLUNT TIP SYSTEM

## (undated) DEVICE — LAMINECTOMY CDS: Brand: MEDLINE INDUSTRIES, INC.

## (undated) DEVICE — Device

## (undated) DEVICE — SPONGE: SPECIALTY PEANUT XR 100/CS: Brand: MEDICAL ACTION INDUSTRIES

## (undated) DEVICE — GLOVE BIOGEL M SURG SZ 71/2

## (undated) DEVICE — DRAPE C-ARM UNIVERSAL

## (undated) DEVICE — DRAPE HALF 40X58 DYNJP2410

## (undated) DEVICE — SUTURE MONOCRYL 4-0 PS-2

## (undated) DEVICE — UNDYED BRAIDED (POLYGLACTIN 910), SYNTHETIC ABSORBABLE SUTURE: Brand: COATED VICRYL

## (undated) DEVICE — STERILE LATEX POWDER-FREE SURGICAL GLOVESWITH NITRILE COATING: Brand: PROTEXIS

## (undated) DEVICE — GOWN SURG AERO CHROME XXL

## (undated) DEVICE — STANDARD HYPODERMIC NEEDLE,POLYPROPYLENE HUB: Brand: MONOJECT

## (undated) DEVICE — PROXIMATE SKIN STAPLERS (35 WIDE) CONTAINS 35 STAINLESS STEEL STAPLES (FIXED HEAD): Brand: PROXIMATE

## (undated) DEVICE — AGENT HMST STRL KT MTRX THRMB

## (undated) DEVICE — LIGACLIP EXTRA LIGATING CLIP CARTRIDGES: 6 TITANIUM CLIPS/ CARTRIDGE (SMALL): Brand: LIGACLIP

## (undated) DEVICE — KIT STAPLER BARIATRIC BYPASS

## (undated) DEVICE — TOWEL OR BLU 16X26 STRL

## (undated) DEVICE — NEEDLE HPO 18GA 1.5IN ECLPS

## (undated) DEVICE — ENDOSCOPY PORT CONNECTOR FOR OLYMPUS® SCOPES: Brand: ERBE

## (undated) DEVICE — SUTURE VICRYL 3-0 RB-1

## (undated) DEVICE — HEAD AND NECK CDS-LF: Brand: MEDLINE INDUSTRIES, INC.

## (undated) DEVICE — LIGHT HANDLE

## (undated) DEVICE — AIRWAY ENDO  TRIVANTAGE 7MM

## (undated) DEVICE — SPONGE RAYTEC 4X4 RF DETECT

## (undated) DEVICE — 3M™ STERI-DRAPE™ INSTRUMENT POUCH 1018L: Brand: STERI-DRAPE™

## (undated) DEVICE — GLOVE SURG TRIUMPH SZ 8

## (undated) DEVICE — ARISTA 1 GRAM

## (undated) DEVICE — 3M™ TEGADERM™ TRANSPARENT FILM DRESSING, 1626W, 4 IN X 4-3/4 IN (10 CM X 12 CM), 50 EACH/CARTON, 4 CARTON/CASE: Brand: 3M™ TEGADERM™

## (undated) DEVICE — 3M™ STERI-STRIP™ REINFORCED ADHESIVE SKIN CLOSURES, R1541, 1/4 IN X 3 IN (6 MM X 75 MM), 3 STRIPS/ENVELOPE: Brand: 3M™ STERI-STRIP™

## (undated) DEVICE — 3M™ STERI-STRIP™ REINFORCED ADHESIVE SKIN CLOSURES, R1547, 1/2 IN X 4 IN (12 MM X 100 MM), 6 STRIPS/ENVELOPE: Brand: 3M™ STERI-STRIP™

## (undated) DEVICE — MEDI-VAC SUCTION FINE CAPACITY: Brand: CARDINAL HEALTH

## (undated) DEVICE — SYRINGE 10ML LL TIP

## (undated) DEVICE — MINI LAP PACK-LF: Brand: MEDLINE INDUSTRIES, INC.

## (undated) DEVICE — HARMONIC ACE +7 LAPAROSCOPIC SHEARS ADVANCED HEMOSTASIS 5MM DIAMETER 45CM SHAFT LENGTH  FOR USE WITH GRAY HAND PIECE ONLY: Brand: HARMONIC ACE

## (undated) DEVICE — RETRACT LONE STAR STAYS DULL

## (undated) DEVICE — [HIGH FLOW INSUFFLATOR,  DO NOT USE IF PACKAGE IS DAMAGED,  KEEP DRY,  KEEP AWAY FROM SUNLIGHT,  PROTECT FROM HEAT AND RADIOACTIVE SOURCES.]: Brand: PNEUMOSURE

## (undated) DEVICE — APPLICATOR COTTON TIP 6\" 2/PK

## (undated) DEVICE — SUTURE VICRYL 3-0 SH

## (undated) DEVICE — DRAPE MICROSCOPE NEURO PENTERO

## (undated) DEVICE — SUTURE PDS II 1 CT-1

## (undated) DEVICE — HYBRID TUBING/CAP SET FOR OLYMPUS® SCOPES: Brand: ERBE

## (undated) DEVICE — DEVICE CLSR CRTR-THOMASON CLSR

## (undated) DEVICE — GAMMEX® NON-LATEX PI ORTHO SIZE 8, STERILE POLYISOPRENE POWDER-FREE SURGICAL GLOVE: Brand: GAMMEX

## (undated) DEVICE — SOLUTION SURG DURA PREP HAZMAT

## (undated) DEVICE — DRAPE TABLE COVER 44X90 TC-10

## (undated) DEVICE — DRILL NEURO SOFT TOUCH 3.0X3.8

## (undated) DEVICE — #15 STERILE STAINLESS BLADE: Brand: STERILE STAINLESS BLADES

## (undated) DEVICE — DRAPE SHEET LG

## (undated) DEVICE — PROBE 8225101 5PK STD PRASS FL TIP ROHS

## (undated) NOTE — MR AVS SNAPSHOT
[facility] [employer] [place] [id number] [phone]               Thank you for choosing us for your health care visit with Trisha Whitmore MD.  We are glad to serve you and happy to provide you with this summary of you Accurate as of 8/22/17 10:36 AM. Always use your most recent med list.               BYSTOLIC 5 MG Tabs  Generic drug:  Nebivolol HCl  Take 5 mg by mouth every morning.         DULERA 200-5 MCG/ACT Aero  Generic drug:  Mometasone Furo-Formoterol Fum  Inhale If you've recently had a stay at the Hospital you can access your discharge instructions in [employer] by going to Visits < Admission Summaries.  If you've been to the Emergency Department or your doctor's office, you can view your past visit information in My

## (undated) NOTE — ED AVS SNAPSHOT
Kaitlyn Vallejo   MRN: [id number]    Department:  [facility] Emergency Department   Date of Visit:  4/2/2019           Disclosure     Insurance plans vary and the physician(s) referred by the ER may not be covered by your plan.  Please contact your tell this physician (or your personal doctor if your instructions are to return to your personal doctor) about any new or lasting problems. The primary care or specialist physician will see patients referred from the [facility] Emergency Department.  Alexx Shetty

## (undated) NOTE — IP AVS SNAPSHOT
Patient Demographics     Address  [address] [place] [address]  [place] [address] Phone  [phone] (Home)  [phone] (Mobile) *Preferred* E-mail Address  [email or web address]      Emergency Contact(s)     Name Relation Home Work Mobile    Blayne Watson ? Place collar back on after showering. ? Wear even when sleeping IF instructed to do so.  ? Exact time (weeks) of brace to be worn to be determined by surgeon; discuss at office visit  ?  Keep neck straight while removing and replacing collar (No bending, ? Support your neck with one pillow keeping it in a neutral position  ? Sleep on back or side avoiding face down position      Diet and Constipation Prevention  ? Soft diet may help if you have discomfort while swallowing.   ? Cold drinks or food may feel m ? Schedule post-op appointment as instructed, approximately 2 weeks after surgery. ? Follow up with your primary care physician one week after surgery. Review all medications. When to contact your surgeon  ?  Temp > 101F; take your temperature twice a d Commonly known as:  TYLENOL EXTRA STRENGTH      Take 1,000 mg by mouth every 6 (six) hours as needed for Pain.           betamethasone dipropionate 0.05 % Crea  Commonly known as:  Sruthi Butler  Next dose due:  07/6 @ 2100      Apply 1 Application topically 2 HYDROcodone-acetaminophen 5-325 MG Tabs           [id number]-A - MAR ACTION REPORT  (last 24 hrs)    ** SITE UNKNOWN **     Order ID Medication Name Action Time Action Reason Comments    [id number] Fluticasone Furoate-Vilanterol (BREO ELLIPTA) 200-25 MCG/INH i Author:  Kary Mora PA-C Service:  Neurosurgery Author Type:  Physician Assistant    Filed:  6/27/2018 12:31 PM Date of Service:  6/27/2018 12:23 PM Status:  Signed    :  Kary Mora PA-C (Physician Assistant) tightness. He has left greater than right shoulder pain. He has left C6 radiculopathy which is a 5-7/10. He has numbness in all 5 fingers. He feels like his left arm and hand are cool constantly.   He complains of weakness in the left arm denies any wea glasses      Past Surgical History:  No date: ANGIOGRAM  2009: CARDIAC DEFIBRILLATOR PLACEMENT      Comment: Neo Orellana, magnet response inhibit                therapy, not pacemaker dependent  2012: CHOLECYSTECTOMY      Comment: Dr. Brenda Vega  5/7/2 HEENT:  Normocephalic, atraumatic  SKIN: Warm, dry. States shaving accident with pressing too hard leading to redness of the left anterior neck. RESPIRATORY: Easy and even  NEUROLOGICAL:  This patient is alert and oriented.  Speech fluent.  Comprehension i Electronically signed by Lucien Mcdonald PA-C on 6/27/2018 12:31 PM   Attribution Key    BS. 1 - Lucien Mcdonald PA-C on 6/27/2018 12:23 PM                        Consults - MD Consult Notes      Consults signed by Nguyễn Shafer MD at 7/3/2018 12:5 magnesium hydroxide (MILK OF MAGNESIA) 400 MG/5ML suspension 30 mL 30 mL Oral Daily PRN   bisacodyl (DULCOLAX) rectal suppository 10 mg 10 mg Rectal Daily PRN   FLEET ENEMA (FLEET) 7-19 GM/118ML enema 133 mL 1 enema Rectal Once PRN   [] ondansetron • Gallbladder problem    • H/O gastric bypass 08/14/2017   • High blood pressure    • High cholesterol    • History of blood clots     PE 2014, post-op, Left arm blood clot 2009   • Hypertrophy of prostate with urinary obstruction and other lower urinary t • Stroke Neg      Smoking status: Never Smoker                                                              Smokeless tobacco: Never Used                      Alcohol use: Yes           0.0 - 0.6 oz/week     Glasses of wine: 0 - 1 per week    Physical Exam surgery likely causing hoarseness and contributing to aspiration risk. I recommend continuing steroids if ok from primary team and discharging on a tapering dose (medrol dosepak).  We discussed treatment options including observation, vocal cord injection, :  Zuhair Acuna (Speech and Language Pathologist)       ADULT VIDEOFLUOROSCOPIC SWALLOWING STUDY    Admission Date: 6/27/2018  Evaluation Date: 07/02/18  Radiologist: Dr. Nichols Counts: NPO  Diet Re CT soft tissue of neck from 6/30/18 revealed:  Impression        There is a thin prevertebral collection of fluid with some punctate gas extending from C3-4 through 0 C6-7 measuring up to 7 mm in thickness likely representing postsurgical changes/liquefyin Bilabial Seal (VFSS - Thin Liquids): Intact  Bolus Formation (VFSS - Thin Liquids): Impaired  Bolus Propulsion (VFSS - Thin Liquids):  Impaired  Triggered at: Valleculae  Premature Spillage to: Valleculae  Delay (seconds): 1  Residue Severity, Location: Sev FOLLOW UP  Treatment Plan/Recommendations: Dysphagia therapy  Number of Visits to Meet Established Goals: 3    Thank you for your referral.   If you have any questions, please contact [address] [place] [phone] [facility]  Pager [phone][JL.1]      Chuyita Downey Goal #1 Patient will complete Cheyenne maneuver x10 per session given min cues by SLP  MET   Goal #2 Patient will complete effortful swallow x10/session     MET   Goal #3 Patient will complete base of tongue resistance exercises x10/session  MET     FOLLOW U

## (undated) NOTE — LETTER
Sonal Max [address] [address]  [place], [address] [facility]    Consent for Procedure  Date: __________________                                Time: _______________    1.  I authorize the performance upon Abbi Spencer the following operation:  Procedure procedure has been videotaped, the surgeon will obtain the original videotape. The hospital will not be responsible for storage or maintenance of this tape.     6. For the purpose of advancing medical education, I consent to the admittance of observers to t STATEMENTS REQUIRING INSERTION OR COMPLETION WERE FILLED IN.     Signature of Patient:   ___________________________    When the patient is a minor or mentally incompetent to give consent:  Signature of person authorized to consent for patient: ____________

## (undated) NOTE — LETTER
11/15/2018      RE: Tammy Yu     : 10/12/1955    Dear Dr. Beryle Even,    Cardiac clearance is being requested for surgery. Your patient is being scheduled for Sural Nerve biopsy on 18 with Dr. Terrel Nyhan.      We are requesting that patient hold

## (undated) NOTE — MR AVS SNAPSHOT
[facility], [address] [place] [phone]               Thank you for choosing us for your health care visit with [address] [place] [address], .   We are glad to serve you and happy to provide you with this s lubricate and protect the ear canal. The ear canal is the tube that connects the middle ear to the outside of the ear. The wax protects the ear from bacteria, infection, and damage from water or trauma.   The wax that forms in the canal naturally moves towa · Don't use any probing device or object such as cotton-tipped swabs or matilde pins to clean the inside of your ears. · Don’t use ear candles to clean your ears. Candling can be dangerous.  It can burn the ear canal. It can also make the condition worse ins This list is accurate as of: 4/11/17  1:55 PM.  Always use your most recent med list.                ASMANEX 120 METERED DOSES 220 MCG/INH Aepb   Generic drug:  Mometasone Furoate   Inhale 2 puffs into the lungs 2 (two) times daily.            * BYSTOLIC 5 your doctor or other care provider to review them with you. MyChart     Visit [employer]  You can access your MyChart to more actively manage your health care and view more details from this visit by going to [email or web address].   If you'v

## (undated) NOTE — LETTER
[facility] [address], [address]  Consent for Procedure/Sedation    Date:        Time:       1.  I authorize the performance upon Rufina Ware the following:  Automatic Internal Cardiac Defibrillator Generator Change with Papi Signature of person authorized to consent for patient:        Relationship to patient:    ________________________________    ___________________    Witness: _________________________      Date: ___________________    Printed: 9/27/2017   9:05 AM  Patient

## (undated) NOTE — LETTER
11/14/18        [address]  [address] [facility] [address]      Dear Krish Carpio,     We are contacting you from Dr. Jonathan Leggett office. Your health is important to us.  We have not received test results for additional tests that your provider

## (undated) NOTE — LETTER
Last Revised 02/07/06  Obstructive Sleep Apnea Questionnaire    Clinical signs and symptoms suggesting the possibility of LEMUEL    1. Predisposing physical characteristics (positive with any of the following present)  ? BMI 35kg/m²  ?  Craniofacial abnormalit pauses which are frightening to the observer, patient regularly falls asleep within minutes after being left unstimulated) in which case they should be treated as though they have severe sleep apnea.     The sleep laboratory’s assessment (none, mild, modera Point Total for B           C. Requirement for postoperative opioids.                Opioid requirement             Points   None 0    Low dose oral opiod 1    High dose oral opioids, parenteral or neuraxial opiods 3      Point Total for C        Estimation

## (undated) NOTE — IP AVS SNAPSHOT
[facility] [place] [address] [place], [address] ~ [phone]                Discharge Summary   4/30/2017    Miguel Shannon           Admission Information        Provider Department    4/30/2017 He Dimas MD  3sw-A         Frank Fisher ipratropium-albuterol 0.5-2.5 (3) MG/3ML Soln   Commonly known as:  DUONEB        Take 3 mL by nebulization 2 (two) times daily as needed.                             lisinopril 20 MG Tabs   Last time this was given:  20 mg on 5/2/2017  8:37 AM   Commonly Follow-up with gastroenterology tomorrow. Return if bleeding worsens or new symptoms develop. Per Dr. Samantha Geller, you may resume Xarelto in one week.     Discharge References/Attachments     LOWER GI BLEEDING (STABLE) (ENGLISH)      Follow-up Information Rosana Loomis, [address] ([address])    [address] [facility], [address] [place] [address]              Immunization History as of 5/2/2017  Never Reviewed    INFLUENZA 10/19/2016, 11/20/2015, 4/21/2009    Pneumovax 4/2/2014 - If you are a smoker or have smoked in the last 12 months, we encourage you to explore options for quitting.     - If you have concerns related to behavioral health issues or thoughts of harming yourself, contact [address] [facility] a your medications while at home.          Blood Pressure and Cardiac Medications     lisinopril 20 MG Oral Tab    Nebivolol HCl (BYSTOLIC) 5 MG Oral Tab    Mexiletine HCl 150 MG Oral Cap    Nebivolol HCl (BYSTOLIC) 2.5 MG Oral Tab         Use: Treat abnormal Most common side effects: Headache, nasal irritation, palpitations, increased heart rate, increased blood pressure, allergic reaction, yeast infection of the mouth/tongue   What to report to your healthcare provider: Head or mouth pain, coating on mouth/to

## (undated) NOTE — LETTER
2018        RE: Robson Pierce     : 10/12/1955    Dear Dr. Leonel Urbina,    This letter is to inform you that your patient is being scheduled for surgery with Dr. Phoenix on 18 at [facility]. We have asked the patient to contact your office t A Pre-operative History & Physical stating medical clearance is needed. Pre-op labs will be done at THE [facility] and are scheduled by our Ciro Steve 23 department.  If the labs are being done through your office, then please fax results to the pre-admission testi

## (undated) NOTE — MR AVS SNAPSHOT
[facility] [employer] [place] [address] [facility] [address] [phone]               Thank you for choosing us for your health care visit with Igor Sutherland MD.  We are glad to serve you and happy to provide you with this summary of you the building. For security purposes, please check in with the reception staff at every visit.                                  Mar 13, 2018  2:15 PM CDT  PT VISIT BY THERAPIST with Ramesh Coleman, PT  [address] [facility] ([facility] [place] Allergies as of Mar 06, 2018     Bee Venom Anaphylaxis                Today's Vital Signs     BP   107/69    Pulse   74    Height   71\"    Weight   207 lb 11.2 oz (94.2 kg)             Current Medications          Accurate as of 3/6/18  9:40 AM. Always us Call [phone] for help. [employer] is NOT to be used for urgent needs. For medical emergencies, dial 911.           Visit [facility] online at  [email or web address]

## (undated) NOTE — LETTER
2018        RE: Angelique Rodrigez     : 10/12/1955    Dear Dr. Jaquelin Restrepo,    This letter is to inform you that your patient is being scheduled for surgery with Dr. Viridiana Benson on 18 at [facility]. We have asked the patient to contact your office to Cardiology clearance is needed. Patient will need clearance to hold Xarelto fir at least 2 day prior to and 5 days after surgery. Pre-op labs will be done at [facility] and are scheduled by our [facility] department.  If the labs are being done through your

## (undated) NOTE — MR AVS SNAPSHOT
[facility] [address] [place]                Thank you for choosing us for your health care visit with Ritchie Sosa RD.   We are glad to serve you and happy to provide you with this summary of your Current Medications          This list is accurate as of: 3/31/17 10:33 AM.  Always use your most recent med list.                ASMANEX 120 METERED DOSES 220 MCG/INH Aepb   Generic drug:  Mometasone Furoate   Inhale 2 puffs into the lungs 2 (two) time VENTOLIN  (90 Base) MCG/ACT Aers   Generic drug:  Albuterol Sulfate HFA   Inhale 2 Puffs into the lungs every 6 (six) hours as needed. * Notice: This list has 2 medication(s) that are the same as other medications prescribed for you.  Kelsi Caldwell

## (undated) NOTE — MR AVS SNAPSHOT
[facility] [employer] [place] [address] [facility] [phone]               Thank you for choosing us for your health care visit with Pratibha Salgado MD.  We are glad to serve you and happy to provide you with this summary of you 118/72 mmHg 69 71\" 293 lb 4.8 oz (133.04 kg) 40.93 kg/m2         Current Medications          This list is accurate as of: 5/9/17 10:54 AM.  Always use your most recent med list.                * BYSTOLIC 5 MG Tabs   Generic drug:  Nebivolol HCl   Take 5 Take by mouth. * Notice: This list has 2 medication(s) that are the same as other medications prescribed for you. Read the directions carefully, and ask your doctor or other care provider to review them with you.             [address] [facility]

## (undated) NOTE — ED AVS SNAPSHOT
Tammy Yu   MRN: [id number]    Department:  [facility] Emergency Department   Date of Visit:  7/10/2018           Disclosure     Insurance plans vary and the physician(s) referred by the ER may not be covered by your plan.  Please contact you tell this physician (or your personal doctor if your instructions are to return to your personal doctor) about any new or lasting problems. The primary care or specialist physician will see patients referred from the [facility] Emergency Department.  Barron Chan

## (undated) NOTE — MR AVS SNAPSHOT
[facility] [employer] [place] [phone]               Thank you for choosing us for your health care visit with Mis Magallon MD.  We are glad to serve you and happy to provide you with this summary of you 112/63 mmHg 76 71\" 287 lb 8 oz (130.409 kg) 40.12 kg/m2         Current Medications          This list is accurate as of: 6/20/17 11:31 AM.  Always use your most recent med list.                * BYSTOLIC 5 MG Tabs   Generic drug:  Nebivolol HCl   Take 5 Apply 1 Application topically 2 (two) times daily. Commonly known as:  KENALOG           XARELTO OR   Take 20 mg by mouth nightly. * Notice: This list has 2 medication(s) that are the same as other medications prescribed for you.  Read the dire

## (undated) NOTE — Clinical Note
EVANGELINAI, TCM call made, see notes. JEYSON attempted to schedule TCM HFU Janet states she will call to schedule Favio's appointment. Message sent to MD's office.

## (undated) NOTE — Clinical Note
\" what surgery the patient had (knee, hip, spine) cervical spine \" admit date: 6/27/18 \" d/c date: 7/6/18 \" last name of surgeon: Arabella Carter

## (undated) NOTE — Clinical Note
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 He is doing well and feels great. Since October he has been having headaches (something new) and they get better by laying down. After Sonya en Y we always check labs.  But, I feel like we should check him for so

## (undated) NOTE — ED AVS SNAPSHOT
Dorian Cedillo   MRN: [id number]    Department:  [facility] Emergency Department   Date of Visit:  8/3/2018           Disclosure     Insurance plans vary and the physician(s) referred by the ER may not be covered by your plan.  Please contact your tell this physician (or your personal doctor if your instructions are to return to your personal doctor) about any new or lasting problems. The primary care or specialist physician will see patients referred from the [facility] Emergency Department.  Wing Chandra

## (undated) NOTE — ED AVS SNAPSHOT
Yamile Ragsdale   MRN: [id number]    Department:  [facility] Emergency Department   Date of Visit:  8/26/2018           Disclosure     Insurance plans vary and the physician(s) referred by the ER may not be covered by your plan.  Please contact you tell this physician (or your personal doctor if your instructions are to return to your personal doctor) about any new or lasting problems. The primary care or specialist physician will see patients referred from the [facility] Emergency Department.  James Block

## (undated) NOTE — MR AVS SNAPSHOT
After Visit Summary   11/6/2018    Nicky Fisher    MRN: [id number]           Visit Information     Date & Time  11/6/2018  9:45 AM Provider  Robel Triana MD [address] [place] [address] Dept.  Phone  [phone] Future Appointments        Provider Department    11/13/2018 3:20 PM Joellen, 5664  60[facility], [address] [facility]    11/15/2018 4:30 PM Kandi, [address] [place] [address], [address] [place] [address]    11/20/2018 9:00 AM Edison Kapur, Madeleine Brunner [address]  Monday - Friday  10:00 am - 10:00 pm  Saturday - Sunday  10:00 am - 4:00 pm      [address]  Monday - Friday  4:00 pm - 10:00 pm  Saturday - Sunday  10:00 am - 4:00 pm       Conditions needing urgent a

## (undated) NOTE — LETTER
2018        RE: Angelique Rodrigez     : 10/12/1955    Dear Dr. Apurva Day,    This letter is to inform you that your patient is being scheduled for surgery with Dr. Viridiana Benson on 18 at [facility]. We have asked the patient to contact your office t Cardiology clearance is needed. Patient will need clearance to hold Xarelto fir at least 2 day prior to and 5 days after surgery. Pre-op labs will be done at THE [facility] and are scheduled by our Ciro Steve 8305 department.  If the labs are being done through your

## (undated) NOTE — ED AVS SNAPSHOT
Elisa Crawford   MRN: [id number]    Department:  [facility] Emergency Department   Date of Visit:  7/28/2018           Disclosure     Insurance plans vary and the physician(s) referred by the ER may not be covered by your plan.  Please contact you tell this physician (or your personal doctor if your instructions are to return to your personal doctor) about any new or lasting problems. The primary care or specialist physician will see patients referred from the [facility] Emergency Department.  Filemon Campos

## (undated) NOTE — MR AVS SNAPSHOT
[address] [phone]               Thank you for choosing us for your health care visit with Supa Meneses MD.  We are glad to serve you and happy to provide you with this summary of your v Current Medications          This list is accurate as of: 2/20/17  4:35 PM.  Always use your most recent med list.                ASMANEX 120 METERED DOSES 220 MCG/INH Aepb   Generic drug:  Mometasone Furoate   Inhale 2 puffs into the lungs 2 (two) times d VENTOLIN  (90 Base) MCG/ACT Aers   Generic drug:  Albuterol Sulfate HFA   Inhale 2 Puffs into the lungs every 6 (six) hours as needed. * Notice: This list has 2 medication(s) that are the same as other medications prescribed for you.  Shannan Nance Lipid Panel    Complete by:  Feb 20, 2017 (Approximate)    Assoc Dx:  Essential hypertension [I10], LEMUEL on CPAP [G47.33, Z99.89], Chronic fatigue [R53.82], Hypercholesterolemia [E78.00], Gastroesophageal reflux disease with esophagitis [K21.0], Morbid obe discharge instructions in [employer] by going to Visits < Admission Summaries. If you've been to the Emergency Department or your doctor's office, you can view your past visit information in [employer] by going to Visits < Visit Summaries. SNADEC questions?

## (undated) NOTE — ED AVS SNAPSHOT
Abbi Spencer   MRN: [id number]    Department:  [facility] Emergency Department   Date of Visit:  7/23/2018           Disclosure     Insurance plans vary and the physician(s) referred by the ER may not be covered by your plan.  Please contact you tell this physician (or your personal doctor if your instructions are to return to your personal doctor) about any new or lasting problems. The primary care or specialist physician will see patients referred from the [facility] Emergency Department.  Darius Coelho

## (undated) NOTE — MR AVS SNAPSHOT
[facility] [employer] [place] [address] [phone]               Thank you for choosing us for your health care visit with Sam Sicard, RD.   We are glad to serve you and happy to provide you with this summary of your This list is accurate as of: 6/15/17  3:53 PM.  Always use your most recent med list.                * BYSTOLIC 5 MG Tabs   Generic drug:  Nebivolol HCl   Take 5 mg by mouth every morning.            * BYSTOLIC 2.5 MG Tabs   Generic drug:  Nebivolol HCl   T * Notice: This list has 2 medication(s) that are the same as other medications prescribed for you. Read the directions carefully, and ask your doctor or other care provider to review them with you.             MyChart     Visit [employer]  You can access you

## (undated) NOTE — MR AVS SNAPSHOT
[address] [phone]               Thank you for choosing us for your health care visit with Jason Russell MD.  We are glad to serve you and happy to provide you with this summary of your v Instructions and Information about Your Health    Please review assessment and plan.           Allergies as of Jun 08, 2017     Bee Venom Anaphylaxis                Today's Vital Signs     BP Pulse Height Weight BMI    124/77 mmHg 87 5' 11\" (1.803 m) 296 l Tiotropium Bromide Monohydrate 18 MCG Caps   Inhale 18 mcg into the lungs daily. Commonly known as:  SPIRIVA HANDIHALER           triamcinolone acetonide 0.1 % Crea   Apply 1 Application topically 2 (two) times daily.    Commonly known as:  KENALOG

## (undated) NOTE — MR AVS SNAPSHOT
[facility] [employer] [place] [phone]               Thank you for choosing us for your health care visit with Rogelio Vaca MD.  We are glad to serve you and happy to provide you with this summary of you Current Medications          This list is accurate as of: 2/7/17 12:07 PM.  Always use your most recent med list.                ASMANEX 120 METERED DOSES 220 MCG/INH Aepb   Generic drug:  Mometasone Furoate   Inhale 2 puffs into the lungs 2 (two) times da * Notice: This list has 2 medication(s) that are the same as other medications prescribed for you. Read the directions carefully, and ask your doctor or other care provider to review them with you.             MyChart     Visit [employer]  You can access you Get your heart pumping – brisk walking, biking, swimming Even 10 minute increments are effective and add up over the week   2 ½ hours per week – spread out over time Use a luis to keep you motivated   Don’t forget strength training with weights and resist

## (undated) NOTE — Clinical Note
2017    Patient: Weston Elizondo  : 10/12/1955 Visit date: 2017    Dear  Dr. Jonah Suarez MD,    Harlee Mcburney is a pleasant but unfortunate 57year old,, male, who was referred to us for weight management recommendations.   Destiny Mcburney is intereste

## (undated) NOTE — ED AVS SNAPSHOT
Fracisco Potter   MRN: [id number]    Department:  [facility] Emergency Department   Date of Visit:  7/8/2018           Disclosure     Insurance plans vary and the physician(s) referred by the ER may not be covered by your plan.  Please contact your tell this physician (or your personal doctor if your instructions are to return to your personal doctor) about any new or lasting problems. The primary care or specialist physician will see patients referred from the [facility] Emergency Department.  Shailesh Alcantara

## (undated) NOTE — MR AVS SNAPSHOT
[address] [facility]  [phone]           Thank you for choosing [address] for your health care visit with Edda Avila PsyD.   We are glad to serve you and happy to provide you with this blanco Take 1 capsule (40 mg total) by mouth 2 (two) times daily. Pravastatin Sodium 20 MG Tabs   Commonly known as:  PRAVACHOL   Take 20 mg by mouth nightly.            predniSONE 20 MG Tabs   Commonly known as:  DELTASONE   TAKE 3 TABS IN THE AM X 3 DA Apr 21, 2017  9:30 AM   Established Patient with Adrianna Gamez MD   [address], [address] [place] [address] ([address])    [place] [address], [address] [place] [phone]              Demographic Information     Date Of Birth Race Eth

## (undated) NOTE — MR AVS SNAPSHOT
After Visit Summary   8/21/2018    Cornelius Gutierrez    MRN: [id number]           Visit Information     Date & Time  8/21/2018  9:15 AM Provider  Michelle Cat MD [address] [place] [address], [address] [place] [address] Dept.  Phone  [phone] Soln Inhale 2 puffs into the lungs every 4 (four) hours as needed. Mometasone Furo-Formoterol Fum (DULERA) 200-5 MCG/ACT Inhalation Aerosol Inhale 2 puffs into the lungs 2 (two) times daily.     Multiple Vitamins-Minerals (MULTIVITAMIN OR) Take by mout 9/26/2018 9:00 AM Deedee Stiles Speech/Language Thpy in [place]    9/27/2018 10:00 AM Deedee Jean Speech/Language Thpy in [place]    10/22/2018 11:00 AM ABIODUN SUAREZ/ONC NURSE 3 DEPARTMENT OF RADIATION ONCOLOGY    10/[address]/ [address]  Monday - Friday  10:00 am - 10:00 pm  Saturday - Sunday  10:00 am - 4:00 pm      [address]  Monday - Friday  4:00 pm - 10:00 pm  Saturday - Sunday  10:00 am - 4:00 pm       Conditions needing urgent a

## (undated) NOTE — MR AVS SNAPSHOT
[facility] [employer] [place] [phone]               Thank you for choosing us for your health care visit with Marleni Frankel MD.  We are glad to serve you and happy to provide you with this summary of you Accurate as of 12/19/17 10:33 AM. Always use your most recent med list.               betamethasone dipropionate 0.05 % Crea  Apply 1 Application topically 2 (two) times daily.   Commonly known as:  DIPROSONE        BYSTOLIC 5 MG Tabs  Generic drug:  Fabian

## (undated) NOTE — ED AVS SNAPSHOT
Sharyle Casey   MRN: [id number]    Department:  [facility] Emergency Department   Date of Visit:  8/22/2018           Disclosure     Insurance plans vary and the physician(s) referred by the ER may not be covered by your plan.  Please contact you tell this physician (or your personal doctor if your instructions are to return to your personal doctor) about any new or lasting problems. The primary care or specialist physician will see patients referred from the [facility] Emergency Department.  Gabriella Cronin

## (undated) NOTE — IP AVS SNAPSHOT
[address]            (For Outpatient Use Only) Initial Admit Date: 6/27/2018   Inpt/Obs Admit Date: Inpt: 6/28/18 / Obs: N/A   Discharge Date:    Angie Estrada:  [de-identified]   MRN: [de-identified]   CSN: [id number]        ENCOUNTER  Patient Subscriber Name:  Dai Stevenson :    Subscriber ID:  Pt Rel to Subscriber:    Hospital Account [facility] [place] [facility]    2018

## (undated) NOTE — MR AVS SNAPSHOT
[facility] [employer] [place] [address] [phone]               Thank you for choosing us for your health care visit with Mago Garcia MD.  We are glad to serve you and happy to provide you with this summary of you Oct 25, 2017  3:45 PM CDT  Established Patient with Anthony Pang MD  [address], [address] ([address] [place] [address]) [address] [facility], [address] [place] [address] [phone]   Nov 06, 2017 11:30 AM CST  Post-Op Follow Up with [facility], Generic drug:  Montelukast Sodium  Take 10 mg by mouth nightly. TAB-A-JESUS ALBERTO MAXIMUM Tabs  Take 1 tablet by mouth daily. REED-24 300 MG Cp24  Generic drug: Theophylline ER  Take 300 mg by mouth 2 (two) times daily.         Tiotropium Bromide Mo

## (undated) NOTE — LETTER
11/15/2018      RE: Francesco Brand     : 10/12/1955    Dear Dr. Pérez Dowling,      This letter is to inform you that your patient is being scheduled for surgery with Dr. Yuri Peña on 2018 at [facility]. We have asked the patient to contact your offi

## (undated) NOTE — MR AVS SNAPSHOT
[facility] [employer] [place] [address] [facility] [address] [phone]               Thank you for choosing us for your health care visit with Mis Magallon MD.  We are glad to serve you and happy to provide you with this summary of you Inhale 2 puffs into the lungs 2 (two) times daily. * BYSTOLIC 5 MG Tabs   Generic drug:  Nebivolol HCl   Take 5 mg by mouth daily. * BYSTOLIC 2.5 MG Tabs   Generic drug:  Nebivolol HCl   Take 2.5 mg by mouth every evening.            DUL medications prescribed for you. Read the directions carefully, and ask your doctor or other care provider to review them with you.             MyChart     Visit [employer]  You can access your MyChart to more actively manage your health care and view more deta

## (undated) NOTE — ED AVS SNAPSHOT
Luly Ozuna   MRN: [id number]    Department:  [facility] Emergency Department   Date of Visit:  7/23/2018           Disclosure     Insurance plans vary and the physician(s) referred by the ER may not be covered by your plan.  Please contact you tell this physician (or your personal doctor if your instructions are to return to your personal doctor) about any new or lasting problems. The primary care or specialist physician will see patients referred from the [facility] Emergency Department.  Rupesh Winters

## (undated) NOTE — LETTER
[employer] for Lobo Marino     : 10/12/1955     Date: 2018  Provider:  Janak Martino DO  Phone for doctor or clinic: [address] [facility], [address] [place] [phone]

## (undated) NOTE — MR AVS SNAPSHOT
[facility] [employer] [place] [address] [facility] [phone]               Thank you for choosing us for your health care visit with Igor Sutherland MD.  We are glad to serve you and happy to provide you with this summary of you Current Medications          Accurate as of 8/1/17 11:47 AM. Always use your most recent med list.               * BYSTOLIC 5 MG Tabs  Generic drug:  Nebivolol HCl  Take 5 mg by mouth every morning.         * BYSTOLIC 2.5 MG Tabs  Generic drug:  [employer] prescribed for you. Read the directions carefully, and ask your doctor or other care provider to review them with you.                   MyChart    Visit [employer]  You can access your MyChart to more actively manage your health care and view more details fro